# Patient Record
Sex: FEMALE | Race: WHITE | Employment: UNEMPLOYED | URBAN - METROPOLITAN AREA
[De-identification: names, ages, dates, MRNs, and addresses within clinical notes are randomized per-mention and may not be internally consistent; named-entity substitution may affect disease eponyms.]

---

## 2019-09-24 PROBLEM — G89.4 CHRONIC PAIN SYNDROME: Status: ACTIVE | Noted: 2019-09-24

## 2019-10-11 ENCOUNTER — HOSPITAL ENCOUNTER (OUTPATIENT)
Dept: INTERVENTIONAL RADIOLOGY/VASCULAR | Age: 59
Discharge: HOME | End: 2019-10-11
Attending: NEUROLOGICAL SURGERY

## 2019-11-01 ENCOUNTER — HOSPITAL ENCOUNTER (OUTPATIENT)
Dept: CT IMAGING | Age: 59
Discharge: HOME | DRG: 872 | End: 2019-11-01
Attending: NEUROLOGICAL SURGERY
Payer: MEDICARE

## 2019-11-01 ENCOUNTER — HOSPITAL ENCOUNTER (OUTPATIENT)
Dept: INTERVENTIONAL RADIOLOGY/VASCULAR | Age: 59
Discharge: HOME | DRG: 872 | End: 2019-11-01
Attending: NEUROLOGICAL SURGERY
Payer: MEDICARE

## 2019-11-01 VITALS
HEART RATE: 66 BPM | SYSTOLIC BLOOD PRESSURE: 117 MMHG | TEMPERATURE: 97.8 F | RESPIRATION RATE: 18 BRPM | OXYGEN SATURATION: 97 % | DIASTOLIC BLOOD PRESSURE: 71 MMHG

## 2019-11-01 DIAGNOSIS — G89.4 CHRONIC PAIN SYNDROME: ICD-10-CM

## 2019-11-01 DIAGNOSIS — M48.061 SPINAL STENOSIS, LUMBAR REGION, WITHOUT NEUROGENIC CLAUDICATION: ICD-10-CM

## 2019-11-01 PROCEDURE — 72132 CT LUMBAR SPINE W/DYE: CPT

## 2019-11-01 PROCEDURE — 62304 MYELOGRAPHY LUMBAR INJECTION: CPT

## 2019-11-01 PROCEDURE — 74011636320 HC RX REV CODE- 636/320: Performed by: RADIOLOGY

## 2019-11-01 PROCEDURE — 77030014143 HC TY PUNC LUMBR BD -A

## 2019-11-01 PROCEDURE — 77030003666 HC NDL SPINAL BD -A

## 2019-11-01 PROCEDURE — 74011000250 HC RX REV CODE- 250: Performed by: RADIOLOGY

## 2019-11-01 RX ORDER — LIDOCAINE HYDROCHLORIDE 20 MG/ML
1-15 INJECTION, SOLUTION INFILTRATION; PERINEURAL ONCE
Status: COMPLETED | OUTPATIENT
Start: 2019-11-01 | End: 2019-11-01

## 2019-11-01 RX ORDER — DEXAMETHASONE 2 MG/1
2 TABLET ORAL 4 TIMES DAILY
COMMUNITY
End: 2019-11-11

## 2019-11-01 RX ORDER — LIDOCAINE HYDROCHLORIDE 20 MG/ML
1-15 INJECTION, SOLUTION INFILTRATION; PERINEURAL ONCE
Status: DISCONTINUED | OUTPATIENT
Start: 2019-11-01 | End: 2019-11-01

## 2019-11-01 RX ADMIN — LIDOCAINE HYDROCHLORIDE 200 MG: 20 INJECTION, SOLUTION INFILTRATION; PERINEURAL at 11:58

## 2019-11-01 RX ADMIN — IOPAMIDOL 12 ML: 408 INJECTION, SOLUTION INTRATHECAL at 12:03

## 2019-11-01 NOTE — DISCHARGE INSTRUCTIONS
[address]  Department of Interventional Radiology  [phone] Office  [phone] Fax  POST LUMBAR PUNCTURE/MYELOGRAM DISCHARGE INSTRUCTIONS  General Information:  Lumbar Puncture: A LP is done to help diagnose several disorders, like pseudo tumor, migraines, meningitis, and multiple sclerosis. It involves a puncture (usually in the lower spine) into the sac that protects the spinal column. A sample of the fluid in that space is removed and tested in the lab. Myelogram:     A Myelogram involves a lumbar puncture, and instead of removing fluid, contrast will be injected into the sac surrounding the spinal column. It is done to visualize the spinal column, nerve roots, spinal canal, vertebral discs and disc space. It is usually done to diagnose back pain with unknown cause or in preparation for surgery. After the injection, a CT scan will be done, usually within two hours of the injection. Call If:     You should call your Physician and/or the Radiology Nurse if you develop a headache that is not relieved by Tylenol, and worsens when you stand and eases when you lie down, you need to call. You may have developed what is referred to as a spinal headache. Our physician's will probably advise you to be on strict bed rest for 24 hours, to drink lots of fluids and caffeine. If this does not help the head pain, call again the next day. You should call if you have bleeding other than a small spot on your bandage. You should call if you have any numbness, tingling, weakness, fever, chills, urinary retention, severe itching, rash, welts, swelling, or confusion. Follow-Up Instructions: See the doctor who ordered your procedure as he/she has instructed. If you had a Lumbar Puncture or Myelogram, your results should be available to your ordering doctor in 3-5 business days. You can remove your dressing in 24 hours and shower regularly.  Do not bathe or swim for 72 hours. To Reach Us: If you have any questions about your procedure, please call the Interventional Radiology department at [phone]. After business hours (5pm) and weekends, call the answering service at [phone] and ask for the Radiologist on call to be paged. Interventional Radiology General Nurse Discharge    After general anesthesia or intravenous sedation, for 24 hours or while taking prescription Narcotics:  · Limit your activities  · Do not drive and operate hazardous machinery  · Do not make important personal or business decisions  · Do  not drink alcoholic beverages  · If you have not urinated within 8 hours after discharge, please contact your surgeon on call. * Please give a list of your current medications to your Primary Care Provider. * Please update this list whenever your medications are discontinued, doses are     changed, or new medications (including over-the-counter products) are added. * Please carry medication information at all times in case of emergency situations. These are general instructions for a healthy lifestyle:    No smoking/ No tobacco products/ Avoid exposure to second hand smoke  Surgeon General's Warning:  Quitting smoking now greatly reduces serious risk to your health. Obesity, smoking, and sedentary lifestyle greatly increases your risk for illness  A healthy diet, regular physical exercise & weight monitoring are important for maintaining a healthy lifestyle    You may be retaining fluid if you have a history of heart failure or if you experience any of the following symptoms:  Weight gain of 3 pounds or more overnight or 5 pounds in a week, increased swelling in our hands or feet or shortness of breath while lying flat in bed. Please call your doctor as soon as you notice any of these symptoms; do not wait until your next office visit.     Recognize signs and symptoms of STROKE:  F-face looks uneven    A-arms unable to move or move unevenly    S-speech slurred or non-existent    T-time-call 911 as soon as signs and symptoms begin-DO NOT go       Back to bed or wait to see if you get better-TIME IS BRAIN.     Patient Signature:  Date: 11/1/2019  Discharging Nurse: Kevin Whaley RN

## 2019-11-01 NOTE — PROCEDURES
Department of Interventional Radiology  [phone]        Interventional Radiology Brief Procedure Note    Patient: Dannielle Murrieta MRN: [id number]  SSN: [id number]    YOB: 1960  Age: 62 y.o.   Sex: female      Date of Procedure: 11/1/2019    Pre-Procedure Diagnosis: Back pain    Post-Procedure Diagnosis: SAME    Procedure(s): Lumbar Puncture  Myelogram    Brief Description of Procedure: as above    Performed By: Junior Deepali MD     Assistants: None    Anesthesia:Lidocaine    Estimated Blood Loss: None    Specimens:  None    Implants:  None    Findings: L4/5 myelogram    Complications: None    Recommendations: to CT      Follow Up: as needed    Signed By: Junior Deepali MD     November 1, 2019

## 2019-11-02 ENCOUNTER — HOSPITAL ENCOUNTER (INPATIENT)
Age: 59
LOS: 8 days | Discharge: HOME HEALTH | DRG: 872 | End: 2019-11-11
Attending: EMERGENCY MEDICINE | Admitting: INTERNAL MEDICINE
Payer: MEDICARE

## 2019-11-02 ENCOUNTER — APPOINTMENT (OUTPATIENT)
Dept: GENERAL RADIOLOGY | Age: 59
DRG: 872 | End: 2019-11-02
Attending: EMERGENCY MEDICINE
Payer: MEDICARE

## 2019-11-02 DIAGNOSIS — D72.829 LEUKOCYTOSIS, UNSPECIFIED TYPE: ICD-10-CM

## 2019-11-02 DIAGNOSIS — N30.00 ACUTE CYSTITIS WITHOUT HEMATURIA: Primary | ICD-10-CM

## 2019-11-02 PROBLEM — R52 INTRACTABLE PAIN: Status: ACTIVE | Noted: 2019-11-02

## 2019-11-02 LAB
ALBUMIN SERPL-MCNC: 3.4 G/DL (ref 3.2–4.6)
ALBUMIN/GLOB SERPL: 0.6 {RATIO} (ref 1.2–3.5)
ALP SERPL-CCNC: 89 U/L (ref 50–136)
ALT SERPL-CCNC: 13 U/L (ref 12–65)
ANION GAP SERPL CALC-SCNC: 11 MMOL/L (ref 7–16)
AST SERPL-CCNC: 12 U/L (ref 15–37)
BACTERIA URNS QL MICRO: ABNORMAL /HPF
BASOPHILS # BLD: 0 K/UL (ref 0–0.2)
BASOPHILS NFR BLD: 0 % (ref 0–2)
BILIRUB SERPL-MCNC: 0.5 MG/DL (ref 0.2–1.1)
BUN SERPL-MCNC: 19 MG/DL (ref 8–23)
CALCIUM SERPL-MCNC: 9.4 MG/DL (ref 8.3–10.4)
CASTS URNS QL MICRO: 0 /LPF
CHLORIDE SERPL-SCNC: 98 MMOL/L (ref 98–107)
CK SERPL-CCNC: 48 U/L (ref 21–215)
CO2 SERPL-SCNC: 27 MMOL/L (ref 21–32)
CREAT SERPL-MCNC: 0.66 MG/DL (ref 0.6–1)
CRP SERPL-MCNC: 8.7 MG/DL (ref 0–0.9)
DIFFERENTIAL METHOD BLD: ABNORMAL
EOSINOPHIL # BLD: 0 K/UL (ref 0–0.8)
EOSINOPHIL NFR BLD: 0 % (ref 0.5–7.8)
EPI CELLS #/AREA URNS HPF: ABNORMAL /HPF
ERYTHROCYTE [DISTWIDTH] IN BLOOD BY AUTOMATED COUNT: 14.2 % (ref 11.9–14.6)
GLOBULIN SER CALC-MCNC: 5.3 G/DL (ref 2.3–3.5)
GLUCOSE SERPL-MCNC: 237 MG/DL (ref 65–100)
HCT VFR BLD AUTO: 45.1 % (ref 35.8–46.3)
HGB BLD-MCNC: 14.9 G/DL (ref 11.7–15.4)
IMM GRANULOCYTES # BLD AUTO: 0.1 K/UL (ref 0–0.5)
IMM GRANULOCYTES NFR BLD AUTO: 1 % (ref 0–5)
LACTATE BLD-SCNC: 1.26 MMOL/L (ref 0.5–1.9)
LYMPHOCYTES # BLD: 1.8 K/UL (ref 0.5–4.6)
LYMPHOCYTES NFR BLD: 8 % (ref 13–44)
MAGNESIUM SERPL-MCNC: 2.4 MG/DL (ref 1.8–2.4)
MCH RBC QN AUTO: 29.6 PG (ref 26.1–32.9)
MCHC RBC AUTO-ENTMCNC: 33 G/DL (ref 31.4–35)
MCV RBC AUTO: 89.7 FL (ref 79.6–97.8)
MONOCYTES # BLD: 1.8 K/UL (ref 0.1–1.3)
MONOCYTES NFR BLD: 8 % (ref 4–12)
NEUTS SEG # BLD: 18.5 K/UL (ref 1.7–8.2)
NEUTS SEG NFR BLD: 83 % (ref 43–78)
NRBC # BLD: 0 K/UL (ref 0–0.2)
PLATELET # BLD AUTO: 330 K/UL (ref 150–450)
PMV BLD AUTO: 11.8 FL (ref 9.4–12.3)
POTASSIUM SERPL-SCNC: 4 MMOL/L (ref 3.5–5.1)
PROCALCITONIN SERPL-MCNC: <0.1 NG/ML
PROT SERPL-MCNC: 8.7 G/DL (ref 6.3–8.2)
RBC # BLD AUTO: 5.03 M/UL (ref 4.05–5.2)
RBC #/AREA URNS HPF: 0 /HPF
SODIUM SERPL-SCNC: 136 MMOL/L (ref 136–145)
WBC # BLD AUTO: 22.2 K/UL (ref 4.3–11.1)
WBC URNS QL MICRO: ABNORMAL /HPF

## 2019-11-02 PROCEDURE — 83735 ASSAY OF MAGNESIUM: CPT

## 2019-11-02 PROCEDURE — 74011250636 HC RX REV CODE- 250/636: Performed by: EMERGENCY MEDICINE

## 2019-11-02 PROCEDURE — 81003 URINALYSIS AUTO W/O SCOPE: CPT | Performed by: EMERGENCY MEDICINE

## 2019-11-02 PROCEDURE — 81003 URINALYSIS AUTO W/O SCOPE: CPT

## 2019-11-02 PROCEDURE — 81015 MICROSCOPIC EXAM OF URINE: CPT

## 2019-11-02 PROCEDURE — 87150 DNA/RNA AMPLIFIED PROBE: CPT

## 2019-11-02 PROCEDURE — 84145 PROCALCITONIN (PCT): CPT

## 2019-11-02 PROCEDURE — 99218 HC RM OBSERVATION: CPT

## 2019-11-02 PROCEDURE — 86140 C-REACTIVE PROTEIN: CPT

## 2019-11-02 PROCEDURE — 99284 EMERGENCY DEPT VISIT MOD MDM: CPT | Performed by: EMERGENCY MEDICINE

## 2019-11-02 PROCEDURE — 87088 URINE BACTERIA CULTURE: CPT

## 2019-11-02 PROCEDURE — 96375 TX/PRO/DX INJ NEW DRUG ADDON: CPT | Performed by: EMERGENCY MEDICINE

## 2019-11-02 PROCEDURE — 87205 SMEAR GRAM STAIN: CPT

## 2019-11-02 PROCEDURE — 96365 THER/PROPH/DIAG IV INF INIT: CPT | Performed by: EMERGENCY MEDICINE

## 2019-11-02 PROCEDURE — 87040 BLOOD CULTURE FOR BACTERIA: CPT

## 2019-11-02 PROCEDURE — 87086 URINE CULTURE/COLONY COUNT: CPT

## 2019-11-02 PROCEDURE — 96376 TX/PRO/DX INJ SAME DRUG ADON: CPT | Performed by: EMERGENCY MEDICINE

## 2019-11-02 PROCEDURE — 74011250636 HC RX REV CODE- 250/636: Performed by: FAMILY MEDICINE

## 2019-11-02 PROCEDURE — 85025 COMPLETE CBC W/AUTO DIFF WBC: CPT

## 2019-11-02 PROCEDURE — 71046 X-RAY EXAM CHEST 2 VIEWS: CPT

## 2019-11-02 PROCEDURE — 74011000258 HC RX REV CODE- 258: Performed by: EMERGENCY MEDICINE

## 2019-11-02 PROCEDURE — 80053 COMPREHEN METABOLIC PANEL: CPT

## 2019-11-02 PROCEDURE — 83605 ASSAY OF LACTIC ACID: CPT

## 2019-11-02 PROCEDURE — 82550 ASSAY OF CK (CPK): CPT

## 2019-11-02 PROCEDURE — 87077 CULTURE AEROBIC IDENTIFY: CPT

## 2019-11-02 PROCEDURE — 87186 SC STD MICRODIL/AGAR DIL: CPT

## 2019-11-02 PROCEDURE — 96361 HYDRATE IV INFUSION ADD-ON: CPT | Performed by: EMERGENCY MEDICINE

## 2019-11-02 RX ORDER — ONDANSETRON 2 MG/ML
4 INJECTION INTRAMUSCULAR; INTRAVENOUS
Status: COMPLETED | OUTPATIENT
Start: 2019-11-02 | End: 2019-11-02

## 2019-11-02 RX ORDER — HYDROMORPHONE HYDROCHLORIDE 1 MG/ML
1 INJECTION, SOLUTION INTRAMUSCULAR; INTRAVENOUS; SUBCUTANEOUS ONCE
Status: COMPLETED | OUTPATIENT
Start: 2019-11-02 | End: 2019-11-02

## 2019-11-02 RX ORDER — LORAZEPAM 2 MG/ML
1 INJECTION INTRAMUSCULAR ONCE
Status: COMPLETED | OUTPATIENT
Start: 2019-11-02 | End: 2019-11-02

## 2019-11-02 RX ADMIN — SODIUM CHLORIDE 1000 ML: 900 INJECTION, SOLUTION INTRAVENOUS at 20:54

## 2019-11-02 RX ADMIN — HYDROMORPHONE HYDROCHLORIDE 1 MG: 1 INJECTION, SOLUTION INTRAMUSCULAR; INTRAVENOUS; SUBCUTANEOUS at 22:35

## 2019-11-02 RX ADMIN — LORAZEPAM 1 MG: 2 INJECTION, SOLUTION INTRAMUSCULAR; INTRAVENOUS at 23:09

## 2019-11-02 RX ADMIN — ONDANSETRON 4 MG: 2 INJECTION INTRAMUSCULAR; INTRAVENOUS at 20:42

## 2019-11-02 RX ADMIN — CEFTRIAXONE 1 G: 1 INJECTION, POWDER, FOR SOLUTION INTRAMUSCULAR; INTRAVENOUS at 21:35

## 2019-11-02 RX ADMIN — HYDROMORPHONE HYDROCHLORIDE 1 MG: 1 INJECTION, SOLUTION INTRAMUSCULAR; INTRAVENOUS; SUBCUTANEOUS at 21:06

## 2019-11-02 NOTE — ED TRIAGE NOTES
Patient reports fever for 1 month and vomiting intermittently. Had testing CT lumbar with contrast and lumbar puncture myelogram, which resulted as normal. Also placed on steroids, which are no longer helping. Pain radiates down both legs to the knees. Denies urinary symptoms.

## 2019-11-02 NOTE — ED PROVIDER NOTES
Patient  presents the ER complaining of fatigue and worsening back pain. Patient reports progressive worsening of back pain and weakness over the past couple weeks. Has a history of chronic back pain and previous lumbar fusion surgery. Is followed by neurosurgery. Had been on a course of steroids recently for worsening symptoms. Actually had a CT myelogram performed yesterday. Reports some improvement initially with symptoms with Decadron however family notes she has had intermittent fevers over the past couple of days. Reports worsening weakness today. Denies any vomiting but has occasional nausea. Reports some occasional cough. History of COPD. Denies any urinary symptoms. Denies any incontinence of bowel or bladder. Denies any neck pain or neck stiffness    The history is provided by the patient. Back Pain    This is a chronic problem. The current episode started more than 1 week ago. The problem has been gradually worsening. The quality of the pain is described as aching. The pain is at a severity of 5/10. The pain is moderate. Associated symptoms include a fever and weakness. Pertinent negatives include no numbness.         Past Medical History:   Diagnosis Date    Anxiety     Depression     Diabetes ([address]     Thromboembolus (H[facility])     Thyroid cancer ([facility]     Thyroid disease        Past Surgical History:   Procedure Laterality Date    HX BACK SURGERY      HX CHOLECYSTECTOMY      HX GYN      HX LYSIS OF ADHESIONS           Family History:   Problem Relation Age of Onset    Hypertension Mother     Dementia Mother     Cancer Father         lung       Social History     Socioeconomic History    Marital status:      Spouse name: Not on file    Number of children: Not on file    Years of education: Not on file    Highest education level: Not on file   Occupational History    Not on file   Social Needs    Financial resource strain: Not on file    Food insecurity:     Worry: Not on file     Inability: Not on file    Transportation needs:     Medical: Not on file     Non-medical: Not on file   Tobacco Use    Smoking status: Current Every Day Smoker    Smokeless tobacco: Former User   Substance and Sexual Activity    Alcohol use: Yes    Drug use: Not on file    Sexual activity: Not on file   Lifestyle    Physical activity:     Days per week: Not on file     Minutes per session: Not on file    Stress: Not on file   Relationships    Social connections:     Talks on phone: Not on file     Gets together: Not on file     Attends Christian service: Not on file     Active member of club or organization: Not on file     Attends meetings of clubs or organizations: Not on file     Relationship status: Not on file    Intimate partner violence:     Fear of current or ex partner: Not on file     Emotionally abused: Not on file     Physically abused: Not on file     Forced sexual activity: Not on file   Other Topics Concern    Not on file   Social History Narrative    Not on file         ALLERGIES: Latex, natural rubber and Silver nitrate applicators    Review of Systems   Constitutional: Positive for fatigue and fever. HENT: Negative for congestion. Eyes: Negative for photophobia, redness and visual disturbance. Respiratory: Negative for chest tightness, shortness of breath and stridor. Cardiovascular: Negative for palpitations and leg swelling. Gastrointestinal: Positive for nausea. Negative for vomiting. Genitourinary: Negative for flank pain, frequency and urgency. Musculoskeletal: Positive for back pain. Negative for joint swelling, neck pain and neck stiffness. Skin: Negative for color change and pallor. Neurological: Positive for weakness. Negative for light-headedness and numbness. Hematological: Negative for adenopathy. Does not bruise/bleed easily. Psychiatric/Behavioral: Negative for behavioral problems and confusion.    All other systems reviewed and are negative. Vitals:    11/02/19 1842   BP: 125/65   Pulse: 86   Resp: 17   Temp: 98.4 °F (36.9 °C)   SpO2: 95%   Weight: 66.2 kg (146 lb)   Height: 5' 1\" (1.549 m)            Physical Exam   Constitutional: She is oriented to person, place, and time. She appears well-developed and well-nourished. Eyes: Pupils are equal, round, and reactive to light. Conjunctivae and EOM are normal.   Cardiovascular: Normal rate and regular rhythm. Exam reveals no friction rub. No murmur heard. Pulmonary/Chest: Effort normal and breath sounds normal. No stridor. Abdominal: Soft. Bowel sounds are normal. She exhibits no distension. There is no tenderness. Musculoskeletal: Normal range of motion. She exhibits no edema or deformity. Neurological: She is alert and oriented to person, place, and time. No cranial nerve deficit. Coordination normal.   Bilateral Achilles reflexes appear to be symmetrical.   Nursing note and vitals reviewed. MDM  Number of Diagnoses or Management Options  Diagnosis management comments: Given reported fevers, concern for possibly developing infection. Will obtain basic labs, cultures, urinalysis as well as chest x-ray.        Amount and/or Complexity of Data Reviewed  Clinical lab tests: ordered and reviewed  Tests in the radiology section of CPT®: ordered and reviewed    Risk of Complications, Morbidity, and/or Mortality  Presenting problems: moderate  Diagnostic procedures: moderate  Management options: moderate    Patient Progress  Patient progress: stable         Procedures          Results Include:    Recent Results (from the past 24 hour(s))   CBC WITH AUTOMATED DIFF    Collection Time: 11/02/19  6:45 PM   Result Value Ref Range    WBC 22.2 (H) 4.3 - 11.1 K/uL    RBC 5.03 4.05 - 5.2 M/uL    HGB 14.9 11.7 - 15.4 g/dL    HCT 45.1 35.8 - 46.3 %    MCV 89.7 79.6 - 97.8 FL    MCH 29.6 26.1 - 32.9 PG    MCHC 33.0 31.4 - 35.0 g/dL    RDW 14.2 11.9 - 14.6 %    PLATELET 346 725 - 993 K/uL MPV 11.8 9.4 - 12.3 FL    ABSOLUTE NRBC 0.00 0.0 - 0.2 K/uL    DF AUTOMATED      NEUTROPHILS 83 (H) 43 - 78 %    LYMPHOCYTES 8 (L) 13 - 44 %    MONOCYTES 8 4.0 - 12.0 %    EOSINOPHILS 0 (L) 0.5 - 7.8 %    BASOPHILS 0 0.0 - 2.0 %    IMMATURE GRANULOCYTES 1 0.0 - 5.0 %    ABS. NEUTROPHILS 18.5 (H) 1.7 - 8.2 K/UL    ABS. LYMPHOCYTES 1.8 0.5 - 4.6 K/UL    ABS. MONOCYTES 1.8 (H) 0.1 - 1.3 K/UL    ABS. EOSINOPHILS 0.0 0.0 - 0.8 K/UL    ABS. BASOPHILS 0.0 0.0 - 0.2 K/UL    ABS. IMM. GRANS. 0.1 0.0 - 0.5 K/UL   METABOLIC PANEL, COMPREHENSIVE    Collection Time: 11/02/19  8:19 PM   Result Value Ref Range    Sodium 136 136 - 145 mmol/L    Potassium 4.0 3.5 - 5.1 mmol/L    Chloride 98 98 - 107 mmol/L    CO2 27 21 - 32 mmol/L    Anion gap 11 7 - 16 mmol/L    Glucose 237 (H) 65 - 100 mg/dL    BUN 19 8 - 23 MG/DL    Creatinine 0.66 0.6 - 1.0 MG/DL    GFR est AA >60 >60 ml/min/1.73m2    GFR est non-AA >60 >60 ml/min/1.73m2    Calcium 9.4 8.3 - 10.4 MG/DL    Bilirubin, total 0.5 0.2 - 1.1 MG/DL    ALT (SGPT) 13 12 - 65 U/L    AST (SGOT) 12 (L) 15 - 37 U/L    Alk.  phosphatase 89 50 - 136 U/L    Protein, total 8.7 (H) 6.3 - 8.2 g/dL    Albumin 3.4 3.2 - 4.6 g/dL    Globulin 5.3 (H) 2.3 - 3.5 g/dL    A-G Ratio 0.6 (L) 1.2 - 3.5     PROCALCITONIN    Collection Time: 11/02/19  8:19 PM   Result Value Ref Range    Procalcitonin <0.1 ng/mL   CK    Collection Time: 11/02/19  8:19 PM   Result Value Ref Range    CK 48 21 - 215 U/L   C REACTIVE PROTEIN, QT    Collection Time: 11/02/19  8:19 PM   Result Value Ref Range    C-Reactive protein 8.7 (H) 0.0 - 0.9 mg/dL   MAGNESIUM    Collection Time: 11/02/19  8:19 PM   Result Value Ref Range    Magnesium 2.4 1.8 - 2.4 mg/dL   POC LACTIC ACID    Collection Time: 11/02/19  8:24 PM   Result Value Ref Range    Lactic Acid (POC) 1.26 0.5 - 1.9 mmol/L   URINE MICROSCOPIC    Collection Time: 11/02/19  9:03 PM   Result Value Ref Range    WBC 0-3 0 /hpf    RBC 0 0 /hpf    Epithelial cells 0-3 0 /hpf Bacteria 4+ (H) 0 /hpf    Casts 0 0 /lpf     Voice dictation software was used during the making of this note. This software is not perfect and grammatical and other typographical errors may be present. This note has been proofread, but may still contain errors.   Andry Sterling MD; 11/2/2019 @10:03 PM   ===================================================================

## 2019-11-03 ENCOUNTER — APPOINTMENT (OUTPATIENT)
Dept: CT IMAGING | Age: 59
DRG: 872 | End: 2019-11-03
Attending: FAMILY MEDICINE
Payer: MEDICARE

## 2019-11-03 PROBLEM — R11.2 NAUSEA & VOMITING: Status: RESOLVED | Noted: 2019-11-03 | Resolved: 2019-11-03

## 2019-11-03 PROBLEM — A41.01 SEPSIS DUE TO METHICILLIN SUSCEPTIBLE STAPHYLOCOCCUS AUREUS (HCC): Status: ACTIVE | Noted: 2019-11-03

## 2019-11-03 PROBLEM — B95.61 MSSA BACTEREMIA: Status: ACTIVE | Noted: 2019-11-03

## 2019-11-03 PROBLEM — R78.81 MRSA BACTEREMIA: Status: ACTIVE | Noted: 2019-11-03

## 2019-11-03 PROBLEM — A41.9 SEPSIS (HCC): Status: ACTIVE | Noted: 2019-11-03

## 2019-11-03 PROBLEM — G89.4 CHRONIC PAIN SYNDROME: Chronic | Status: ACTIVE | Noted: 2019-09-24

## 2019-11-03 PROBLEM — E11.9 DIABETES (HCC): Chronic | Status: ACTIVE | Noted: 2019-11-03

## 2019-11-03 PROBLEM — R50.9 FEVER: Status: ACTIVE | Noted: 2019-11-03

## 2019-11-03 PROBLEM — B95.62 MRSA BACTEREMIA: Status: ACTIVE | Noted: 2019-11-03

## 2019-11-03 PROBLEM — R82.71 BACTERIURIA: Status: ACTIVE | Noted: 2019-11-03

## 2019-11-03 PROBLEM — M54.59 INTRACTABLE LOW BACK PAIN: Status: ACTIVE | Noted: 2019-11-03

## 2019-11-03 PROBLEM — R11.2 NAUSEA & VOMITING: Status: ACTIVE | Noted: 2019-11-03

## 2019-11-03 LAB
ACC. NO. FROM MICRO ORDER, ACCP: ABNORMAL
ANION GAP SERPL CALC-SCNC: 14 MMOL/L (ref 7–16)
APPEARANCE UR: ABNORMAL
BILIRUB UR QL: NEGATIVE
BUN SERPL-MCNC: 15 MG/DL (ref 8–23)
CALCIUM SERPL-MCNC: 8.6 MG/DL (ref 8.3–10.4)
CHLORIDE SERPL-SCNC: 99 MMOL/L (ref 98–107)
CLINICAL CONSIDERATION: ABNORMAL
CO2 SERPL-SCNC: 21 MMOL/L (ref 21–32)
COLOR UR: YELLOW
CREAT SERPL-MCNC: 0.45 MG/DL (ref 0.6–1)
CRP SERPL-MCNC: 12.8 MG/DL (ref 0–0.9)
ERYTHROCYTE [DISTWIDTH] IN BLOOD BY AUTOMATED COUNT: 14.2 % (ref 11.9–14.6)
GLUCOSE BLD STRIP.AUTO-MCNC: 151 MG/DL (ref 65–100)
GLUCOSE BLD STRIP.AUTO-MCNC: 159 MG/DL (ref 65–100)
GLUCOSE BLD STRIP.AUTO-MCNC: 185 MG/DL (ref 65–100)
GLUCOSE BLD STRIP.AUTO-MCNC: 221 MG/DL (ref 65–100)
GLUCOSE SERPL-MCNC: 167 MG/DL (ref 65–100)
GLUCOSE UR STRIP.AUTO-MCNC: >1000 MG/DL
HCT VFR BLD AUTO: 41.8 % (ref 35.8–46.3)
HGB BLD-MCNC: 13.3 G/DL (ref 11.7–15.4)
HGB UR QL STRIP: NEGATIVE
INTERPRETATION: ABNORMAL
KETONES UR QL STRIP.AUTO: 15 MG/DL
LEUKOCYTE ESTERASE UR QL STRIP.AUTO: NEGATIVE
MCH RBC QN AUTO: 29.2 PG (ref 26.1–32.9)
MCHC RBC AUTO-ENTMCNC: 31.8 G/DL (ref 31.4–35)
MCV RBC AUTO: 91.7 FL (ref 79.6–97.8)
MECA (METHICILLIN-RESISTANCE GENES), MRGP: NOT DETECTED
NITRITE UR QL STRIP.AUTO: NEGATIVE
NRBC # BLD: 0 K/UL (ref 0–0.2)
PH UR STRIP: 5.5 [PH] (ref 5–9)
PLATELET # BLD AUTO: 269 K/UL (ref 150–450)
PMV BLD AUTO: 11.8 FL (ref 9.4–12.3)
POTASSIUM SERPL-SCNC: 3.6 MMOL/L (ref 3.5–5.1)
PROT UR STRIP-MCNC: NEGATIVE MG/DL
RBC # BLD AUTO: 4.56 M/UL (ref 4.05–5.2)
SODIUM SERPL-SCNC: 134 MMOL/L (ref 136–145)
SP GR UR REFRACTOMETRY: 1.04 (ref 1–1.02)
STAPHYLOCOCCUS AUREUS: DETECTED
STAPHYLOCOCCUS, STAPP: DETECTED
UROBILINOGEN UR QL STRIP.AUTO: 1 EU/DL (ref 0.2–1)
WBC # BLD AUTO: 22 K/UL (ref 4.3–11.1)

## 2019-11-03 PROCEDURE — 74011636637 HC RX REV CODE- 636/637: Performed by: FAMILY MEDICINE

## 2019-11-03 PROCEDURE — 74011250636 HC RX REV CODE- 250/636: Performed by: INTERNAL MEDICINE

## 2019-11-03 PROCEDURE — 74011636320 HC RX REV CODE- 636/320: Performed by: FAMILY MEDICINE

## 2019-11-03 PROCEDURE — 74177 CT ABD & PELVIS W/CONTRAST: CPT

## 2019-11-03 PROCEDURE — 86140 C-REACTIVE PROTEIN: CPT

## 2019-11-03 PROCEDURE — 80048 BASIC METABOLIC PNL TOTAL CA: CPT

## 2019-11-03 PROCEDURE — 74011250637 HC RX REV CODE- 250/637: Performed by: INTERNAL MEDICINE

## 2019-11-03 PROCEDURE — 77030020263 HC SOL INJ SOD CL0.9% LFCR 1000ML

## 2019-11-03 PROCEDURE — 74011000258 HC RX REV CODE- 258: Performed by: FAMILY MEDICINE

## 2019-11-03 PROCEDURE — 82962 GLUCOSE BLOOD TEST: CPT

## 2019-11-03 PROCEDURE — 65270000029 HC RM PRIVATE

## 2019-11-03 PROCEDURE — 74011000258 HC RX REV CODE- 258: Performed by: INTERNAL MEDICINE

## 2019-11-03 PROCEDURE — 99218 HC RM OBSERVATION: CPT

## 2019-11-03 PROCEDURE — 74011250636 HC RX REV CODE- 250/636: Performed by: FAMILY MEDICINE

## 2019-11-03 PROCEDURE — 74011250637 HC RX REV CODE- 250/637: Performed by: FAMILY MEDICINE

## 2019-11-03 PROCEDURE — 36415 COLL VENOUS BLD VENIPUNCTURE: CPT

## 2019-11-03 PROCEDURE — 85027 COMPLETE CBC AUTOMATED: CPT

## 2019-11-03 PROCEDURE — 86580 TB INTRADERMAL TEST: CPT | Performed by: FAMILY MEDICINE

## 2019-11-03 PROCEDURE — 74011636637 HC RX REV CODE- 636/637: Performed by: INTERNAL MEDICINE

## 2019-11-03 PROCEDURE — 74011000302 HC RX REV CODE- 302: Performed by: FAMILY MEDICINE

## 2019-11-03 PROCEDURE — 96376 TX/PRO/DX INJ SAME DRUG ADON: CPT

## 2019-11-03 RX ORDER — VANCOMYCIN 1.75 GRAM/500 ML IN 0.9 % SODIUM CHLORIDE INTRAVENOUS
1750 ONCE
Status: DISCONTINUED | OUTPATIENT
Start: 2019-11-03 | End: 2019-11-03

## 2019-11-03 RX ORDER — HEPARIN SODIUM 5000 [USP'U]/ML
5000 INJECTION, SOLUTION INTRAVENOUS; SUBCUTANEOUS EVERY 8 HOURS
Status: DISCONTINUED | OUTPATIENT
Start: 2019-11-03 | End: 2019-11-11 | Stop reason: HOSPADM

## 2019-11-03 RX ORDER — FLUOXETINE HYDROCHLORIDE 20 MG/1
20 CAPSULE ORAL DAILY
Status: DISCONTINUED | OUTPATIENT
Start: 2019-11-03 | End: 2019-11-11 | Stop reason: HOSPADM

## 2019-11-03 RX ORDER — INSULIN LISPRO 100 [IU]/ML
INJECTION, SOLUTION INTRAVENOUS; SUBCUTANEOUS
Status: DISCONTINUED | OUTPATIENT
Start: 2019-11-03 | End: 2019-11-11 | Stop reason: HOSPADM

## 2019-11-03 RX ORDER — VANCOMYCIN/0.9 % SOD CHLORIDE 750 MG/250
750 PLASTIC BAG, INJECTION (ML) INTRAVENOUS EVERY 12 HOURS
Status: DISCONTINUED | OUTPATIENT
Start: 2019-11-04 | End: 2019-11-03

## 2019-11-03 RX ORDER — SODIUM CHLORIDE 0.9 % (FLUSH) 0.9 %
5-40 SYRINGE (ML) INJECTION EVERY 8 HOURS
Status: DISCONTINUED | OUTPATIENT
Start: 2019-11-03 | End: 2019-11-11 | Stop reason: HOSPADM

## 2019-11-03 RX ORDER — OXYCODONE HYDROCHLORIDE 5 MG/1
10 TABLET ORAL
Status: DISCONTINUED | OUTPATIENT
Start: 2019-11-03 | End: 2019-11-11 | Stop reason: HOSPADM

## 2019-11-03 RX ORDER — MORPHINE SULFATE 15 MG/1
15 TABLET, FILM COATED, EXTENDED RELEASE ORAL EVERY 12 HOURS
Status: DISCONTINUED | OUTPATIENT
Start: 2019-11-03 | End: 2019-11-03

## 2019-11-03 RX ORDER — MORPHINE SULFATE 30 MG/1
30 TABLET, FILM COATED, EXTENDED RELEASE ORAL EVERY 12 HOURS
Status: DISCONTINUED | OUTPATIENT
Start: 2019-11-03 | End: 2019-11-11 | Stop reason: HOSPADM

## 2019-11-03 RX ORDER — ONDANSETRON 2 MG/ML
4 INJECTION INTRAMUSCULAR; INTRAVENOUS
Status: DISCONTINUED | OUTPATIENT
Start: 2019-11-03 | End: 2019-11-11 | Stop reason: HOSPADM

## 2019-11-03 RX ORDER — ROSUVASTATIN CALCIUM 20 MG/1
20 TABLET, COATED ORAL
Status: DISCONTINUED | OUTPATIENT
Start: 2019-11-03 | End: 2019-11-11 | Stop reason: HOSPADM

## 2019-11-03 RX ORDER — DIPHENHYDRAMINE HCL 25 MG
25 CAPSULE ORAL
Status: DISCONTINUED | OUTPATIENT
Start: 2019-11-03 | End: 2019-11-11 | Stop reason: HOSPADM

## 2019-11-03 RX ORDER — ALBUTEROL SULFATE 0.83 MG/ML
2.5 SOLUTION RESPIRATORY (INHALATION)
Status: DISCONTINUED | OUTPATIENT
Start: 2019-11-03 | End: 2019-11-11 | Stop reason: HOSPADM

## 2019-11-03 RX ORDER — TRAZODONE HYDROCHLORIDE 50 MG/1
100 TABLET ORAL
Status: DISCONTINUED | OUTPATIENT
Start: 2019-11-03 | End: 2019-11-11 | Stop reason: HOSPADM

## 2019-11-03 RX ORDER — SODIUM CHLORIDE 0.9 % (FLUSH) 0.9 %
5-40 SYRINGE (ML) INJECTION AS NEEDED
Status: DISCONTINUED | OUTPATIENT
Start: 2019-11-03 | End: 2019-11-11 | Stop reason: HOSPADM

## 2019-11-03 RX ORDER — CEFAZOLIN SODIUM/WATER 2 G/20 ML
2 SYRINGE (ML) INTRAVENOUS EVERY 8 HOURS
Status: DISCONTINUED | OUTPATIENT
Start: 2019-11-03 | End: 2019-11-11 | Stop reason: HOSPADM

## 2019-11-03 RX ORDER — HYDROMORPHONE HYDROCHLORIDE 1 MG/ML
1 INJECTION, SOLUTION INTRAMUSCULAR; INTRAVENOUS; SUBCUTANEOUS
Status: DISCONTINUED | OUTPATIENT
Start: 2019-11-03 | End: 2019-11-11 | Stop reason: HOSPADM

## 2019-11-03 RX ORDER — LISINOPRIL 5 MG/1
10 TABLET ORAL DAILY
Status: DISCONTINUED | OUTPATIENT
Start: 2019-11-03 | End: 2019-11-11 | Stop reason: HOSPADM

## 2019-11-03 RX ORDER — BISACODYL 5 MG
5 TABLET, DELAYED RELEASE (ENTERIC COATED) ORAL DAILY PRN
Status: DISCONTINUED | OUTPATIENT
Start: 2019-11-03 | End: 2019-11-11 | Stop reason: HOSPADM

## 2019-11-03 RX ORDER — FLUTICASONE PROPIONATE 50 MCG
2 SPRAY, SUSPENSION (ML) NASAL DAILY
Status: DISCONTINUED | OUTPATIENT
Start: 2019-11-03 | End: 2019-11-11 | Stop reason: HOSPADM

## 2019-11-03 RX ORDER — SODIUM CHLORIDE 0.9 % (FLUSH) 0.9 %
10 SYRINGE (ML) INJECTION
Status: COMPLETED | OUTPATIENT
Start: 2019-11-03 | End: 2019-11-03

## 2019-11-03 RX ORDER — INSULIN GLARGINE 100 [IU]/ML
10 INJECTION, SOLUTION SUBCUTANEOUS
Status: DISCONTINUED | OUTPATIENT
Start: 2019-11-03 | End: 2019-11-04

## 2019-11-03 RX ORDER — CLONAZEPAM 0.5 MG/1
0.5 TABLET ORAL
Status: DISCONTINUED | OUTPATIENT
Start: 2019-11-03 | End: 2019-11-11 | Stop reason: HOSPADM

## 2019-11-03 RX ORDER — GABAPENTIN 400 MG/1
400 CAPSULE ORAL 3 TIMES DAILY
Status: DISCONTINUED | OUTPATIENT
Start: 2019-11-03 | End: 2019-11-11 | Stop reason: HOSPADM

## 2019-11-03 RX ADMIN — INSULIN LISPRO 4 UNITS: 100 INJECTION, SOLUTION INTRAVENOUS; SUBCUTANEOUS at 07:30

## 2019-11-03 RX ADMIN — Medication 10 ML: at 15:04

## 2019-11-03 RX ADMIN — Medication 10 ML: at 01:09

## 2019-11-03 RX ADMIN — GABAPENTIN 400 MG: 400 CAPSULE ORAL at 21:03

## 2019-11-03 RX ADMIN — FLUOXETINE 20 MG: 20 CAPSULE ORAL at 09:29

## 2019-11-03 RX ADMIN — HYDROMORPHONE HYDROCHLORIDE 1 MG: 1 INJECTION, SOLUTION INTRAMUSCULAR; INTRAVENOUS; SUBCUTANEOUS at 21:03

## 2019-11-03 RX ADMIN — DIATRIZOATE MEGLUMINE AND DIATRIZOATE SODIUM 15 ML: 660; 100 LIQUID ORAL; RECTAL at 04:42

## 2019-11-03 RX ADMIN — OXYCODONE HYDROCHLORIDE 10 MG: 5 TABLET ORAL at 00:42

## 2019-11-03 RX ADMIN — LISINOPRIL 10 MG: 5 TABLET ORAL at 09:29

## 2019-11-03 RX ADMIN — GABAPENTIN 400 MG: 400 CAPSULE ORAL at 17:27

## 2019-11-03 RX ADMIN — OXYCODONE HYDROCHLORIDE 10 MG: 5 TABLET ORAL at 23:31

## 2019-11-03 RX ADMIN — ONDANSETRON 4 MG: 2 INJECTION INTRAMUSCULAR; INTRAVENOUS at 01:00

## 2019-11-03 RX ADMIN — HYDROMORPHONE HYDROCHLORIDE 1 MG: 1 INJECTION, SOLUTION INTRAMUSCULAR; INTRAVENOUS; SUBCUTANEOUS at 17:27

## 2019-11-03 RX ADMIN — CLONAZEPAM 0.5 MG: 0.5 TABLET ORAL at 00:41

## 2019-11-03 RX ADMIN — VANCOMYCIN HYDROCHLORIDE 1750 MG: 10 INJECTION, POWDER, LYOPHILIZED, FOR SOLUTION INTRAVENOUS at 15:03

## 2019-11-03 RX ADMIN — TUBERCULIN PURIFIED PROTEIN DERIVATIVE 5 UNITS: 5 INJECTION, SOLUTION INTRADERMAL at 09:27

## 2019-11-03 RX ADMIN — OXYCODONE HYDROCHLORIDE 10 MG: 5 TABLET ORAL at 09:29

## 2019-11-03 RX ADMIN — HEPARIN SODIUM 5000 UNITS: 5000 INJECTION INTRAVENOUS; SUBCUTANEOUS at 21:03

## 2019-11-03 RX ADMIN — MORPHINE SULFATE 15 MG: 15 TABLET, FILM COATED, EXTENDED RELEASE ORAL at 02:30

## 2019-11-03 RX ADMIN — FLUTICASONE PROPIONATE 2 SPRAY: 50 SPRAY, METERED NASAL at 09:27

## 2019-11-03 RX ADMIN — Medication 2 G: at 21:02

## 2019-11-03 RX ADMIN — ROSUVASTATIN CALCIUM 20 MG: 20 TABLET, FILM COATED ORAL at 21:03

## 2019-11-03 RX ADMIN — INSULIN LISPRO 2 UNITS: 100 INJECTION, SOLUTION INTRAVENOUS; SUBCUTANEOUS at 11:30

## 2019-11-03 RX ADMIN — OXYCODONE HYDROCHLORIDE 10 MG: 5 TABLET ORAL at 03:42

## 2019-11-03 RX ADMIN — HYDROMORPHONE HYDROCHLORIDE 1 MG: 1 INJECTION, SOLUTION INTRAMUSCULAR; INTRAVENOUS; SUBCUTANEOUS at 12:05

## 2019-11-03 RX ADMIN — INSULIN GLARGINE 10 UNITS: 100 INJECTION, SOLUTION SUBCUTANEOUS at 22:00

## 2019-11-03 RX ADMIN — HEPARIN SODIUM 5000 UNITS: 5000 INJECTION INTRAVENOUS; SUBCUTANEOUS at 15:00

## 2019-11-03 RX ADMIN — IOPAMIDOL 100 ML: 755 INJECTION, SOLUTION INTRAVENOUS at 07:49

## 2019-11-03 RX ADMIN — HYDROMORPHONE HYDROCHLORIDE 1 MG: 1 INJECTION, SOLUTION INTRAMUSCULAR; INTRAVENOUS; SUBCUTANEOUS at 01:43

## 2019-11-03 RX ADMIN — CEFTRIAXONE 1 G: 1 INJECTION, POWDER, FOR SOLUTION INTRAMUSCULAR; INTRAVENOUS at 10:20

## 2019-11-03 RX ADMIN — TRAZODONE HYDROCHLORIDE 100 MG: 50 TABLET ORAL at 02:30

## 2019-11-03 RX ADMIN — Medication 10 ML: at 05:10

## 2019-11-03 RX ADMIN — MORPHINE SULFATE 30 MG: 30 TABLET, FILM COATED, EXTENDED RELEASE ORAL at 15:03

## 2019-11-03 RX ADMIN — ONDANSETRON 4 MG: 2 INJECTION INTRAMUSCULAR; INTRAVENOUS at 04:53

## 2019-11-03 RX ADMIN — TRAZODONE HYDROCHLORIDE 100 MG: 50 TABLET ORAL at 21:03

## 2019-11-03 RX ADMIN — SODIUM CHLORIDE 100 ML: 900 INJECTION, SOLUTION INTRAVENOUS at 07:49

## 2019-11-03 RX ADMIN — Medication 10 ML: at 07:49

## 2019-11-03 RX ADMIN — INSULIN LISPRO 2 UNITS: 100 INJECTION, SOLUTION INTRAVENOUS; SUBCUTANEOUS at 17:26

## 2019-11-03 RX ADMIN — HYDROMORPHONE HYDROCHLORIDE 1 MG: 1 INJECTION, SOLUTION INTRAMUSCULAR; INTRAVENOUS; SUBCUTANEOUS at 04:50

## 2019-11-03 RX ADMIN — GABAPENTIN 400 MG: 400 CAPSULE ORAL at 09:29

## 2019-11-03 NOTE — PROGRESS NOTES
Patient in severe pain all shift. Patient finally resting this am. Contrast given, patient tollerating well. Awaiting CT.  at bedside. Call light in reach.  Will continue to monitor and report to oncoming shift nurse

## 2019-11-03 NOTE — PROGRESS NOTES
Hospitalist Note     Admit Date:  2019  7:34 PM   Name:  Livan Hurst   Age:  62 y.o.  :  1960   MRN:  [id number]   PCP:  Ramya Hunter MD  Treatment Team: Attending Provider: West Li MD; Primary Nurse: Preston Peterson RN; Primary Nurse: Medina Bartlett; Utilization Review: Eron Christie    HPI/Subjective:   Patient is a 61yo F wit hx anxiety/depression, DM, factor 5, PE, and chronic low back pain who presented with acute worsening of back pain. Has 20y hx back pain and follows pain mgmt and neurosurgery. Has stimulator implanted in back. At a baseline, is functional for ADLs, takes MS contin 15 and prn norco.  c/o 5 days of severe worsening of low back pain radiating into L hip/glute area. No new focal point tenderness. No weakness but limited by pain. one month of worsening fatigue and intermittent fevers and nausea/vomiting. planned EGD/colonoscopy coming up for abdominal complaints. fevers of up to 103 for several days, breaks with sweating, then feels better for a few days. 11/3 - still with severe LBP radiating down both legs, alternating. MS contin increased today. No urinary symptoms or bladder/bowel issues. No CP, SOB, fevers. No other complaints    Objective:     Patient Vitals for the past 24 hrs:   Temp Pulse Resp BP SpO2   19 98.4 °F (36.9 °C) 99 18 133/69 99 %   19 0428 98.4 °F (36.9 °C) 95 20 124/74 97 %   19 0028 98.3 °F (36.8 °C) (!) 106 21 (!) 141/92 92 %   19 98.4 °F (36.9 °C) 86 17 125/65 95 %     Oxygen Therapy  O2 Sat (%): 99 % (19)  O2 Device: Room air (19)    Estimated body mass index is 27.59 kg/m² as calculated from the following:    Height as of this encounter: 5' 1\" (1.549 m). Weight as of this encounter: 66.2 kg (146 lb).       Intake/Output Summary (Last 24 hours) at 11/3/2019 0930  Last data filed at 2019 2248  Gross per 24 hour   Intake 1050 ml   Output    Net 1050 ml *Note that automatically entered I/Os may not be accurate; dependent on patient compliance with collection and accurate  by techs. General:    Well nourished. Alert. Back:  No point tenderness, erythema, fluctuance, swelling  Extremities: Warm and dry. No cyanosis or edema. Skin:     No rashes or jaundice. Neuro:  No gross focal deficits    Data Review:  I have reviewed all labs, meds, and studies from the last 24 hours:    Recent Results (from the past 24 hour(s))   CBC WITH AUTOMATED DIFF    Collection Time: 11/02/19  6:45 PM   Result Value Ref Range    WBC 22.2 (H) 4.3 - 11.1 K/uL    RBC 5.03 4.05 - 5.2 M/uL    HGB 14.9 11.7 - 15.4 g/dL    HCT 45.1 35.8 - 46.3 %    MCV 89.7 79.6 - 97.8 FL    MCH 29.6 26.1 - 32.9 PG    MCHC 33.0 31.4 - 35.0 g/dL    RDW 14.2 11.9 - 14.6 %    PLATELET 454 615 - 646 K/uL    MPV 11.8 9.4 - 12.3 FL    ABSOLUTE NRBC 0.00 0.0 - 0.2 K/uL    DF AUTOMATED      NEUTROPHILS 83 (H) 43 - 78 %    LYMPHOCYTES 8 (L) 13 - 44 %    MONOCYTES 8 4.0 - 12.0 %    EOSINOPHILS 0 (L) 0.5 - 7.8 %    BASOPHILS 0 0.0 - 2.0 %    IMMATURE GRANULOCYTES 1 0.0 - 5.0 %    ABS. NEUTROPHILS 18.5 (H) 1.7 - 8.2 K/UL    ABS. LYMPHOCYTES 1.8 0.5 - 4.6 K/UL    ABS. MONOCYTES 1.8 (H) 0.1 - 1.3 K/UL    ABS. EOSINOPHILS 0.0 0.0 - 0.8 K/UL    ABS. BASOPHILS 0.0 0.0 - 0.2 K/UL    ABS. IMM. GRANS. 0.1 0.0 - 0.5 K/UL   CULTURE, URINE    Collection Time: 11/02/19  8:03 PM   Result Value Ref Range    Special Requests: NO SPECIAL REQUESTS      Culture result:        NO GROWTH AFTER SHORT PERIOD OF INCUBATION. FURTHER RESULTS TO FOLLOW AFTER OVERNIGHT INCUBATION.    METABOLIC PANEL, COMPREHENSIVE    Collection Time: 11/02/19  8:19 PM   Result Value Ref Range    Sodium 136 136 - 145 mmol/L    Potassium 4.0 3.5 - 5.1 mmol/L    Chloride 98 98 - 107 mmol/L    CO2 27 21 - 32 mmol/L    Anion gap 11 7 - 16 mmol/L    Glucose 237 (H) 65 - 100 mg/dL    BUN 19 8 - 23 MG/DL    Creatinine 0.66 0.6 - 1.0 MG/DL    GFR est AA >60 >60 ml/min/1.73m2    GFR est non-AA >60 >60 ml/min/1.73m2    Calcium 9.4 8.3 - 10.4 MG/DL    Bilirubin, total 0.5 0.2 - 1.1 MG/DL    ALT (SGPT) 13 12 - 65 U/L    AST (SGOT) 12 (L) 15 - 37 U/L    Alk.  phosphatase 89 50 - 136 U/L    Protein, total 8.7 (H) 6.3 - 8.2 g/dL    Albumin 3.4 3.2 - 4.6 g/dL    Globulin 5.3 (H) 2.3 - 3.5 g/dL    A-G Ratio 0.6 (L) 1.2 - 3.5     PROCALCITONIN    Collection Time: 11/02/19  8:19 PM   Result Value Ref Range    Procalcitonin <0.1 ng/mL   CK    Collection Time: 11/02/19  8:19 PM   Result Value Ref Range    CK 48 21 - 215 U/L   C REACTIVE PROTEIN, QT    Collection Time: 11/02/19  8:19 PM   Result Value Ref Range    C-Reactive protein 8.7 (H) 0.0 - 0.9 mg/dL   MAGNESIUM    Collection Time: 11/02/19  8:19 PM   Result Value Ref Range    Magnesium 2.4 1.8 - 2.4 mg/dL   POC LACTIC ACID    Collection Time: 11/02/19  8:24 PM   Result Value Ref Range    Lactic Acid (POC) 1.26 0.5 - 1.9 mmol/L   URINE MICROSCOPIC    Collection Time: 11/02/19  9:03 PM   Result Value Ref Range    WBC 0-3 0 /hpf    RBC 0 0 /hpf    Epithelial cells 0-3 0 /hpf    Bacteria 4+ (H) 0 /hpf    Casts 0 0 /lpf   URINALYSIS W/ RFLX MICROSCOPIC    Collection Time: 11/02/19  9:03 PM   Result Value Ref Range    Color YELLOW      Appearance CLOUDY      Specific gravity 1.039 (H) 1.001 - 1.023      pH (UA) 5.5 5.0 - 9.0      Protein NEGATIVE  NEG mg/dL    Glucose >1,000 mg/dL    Ketone 15 (A) NEG mg/dL    Bilirubin NEGATIVE  NEG      Blood NEGATIVE  NEG      Urobilinogen 1.0 0.2 - 1.0 EU/dL    Nitrites NEGATIVE  NEG      Leukocyte Esterase NEGATIVE  NEG     CBC W/O DIFF    Collection Time: 11/03/19  5:29 AM   Result Value Ref Range    WBC 22.0 (H) 4.3 - 11.1 K/uL    RBC 4.56 4.05 - 5.2 M/uL    HGB 13.3 11.7 - 15.4 g/dL    HCT 41.8 35.8 - 46.3 %    MCV 91.7 79.6 - 97.8 FL    MCH 29.2 26.1 - 32.9 PG    MCHC 31.8 31.4 - 35.0 g/dL    RDW 14.2 11.9 - 14.6 %    PLATELET 912 200 - 911 K/uL    MPV 11.8 9.4 - 12.3 FL    ABSOLUTE NRBC 0.00 0.0 - 0.2 K/uL   METABOLIC PANEL, BASIC    Collection Time: 11/03/19  5:29 AM   Result Value Ref Range    Sodium 134 (L) 136 - 145 mmol/L    Potassium 3.6 3.5 - 5.1 mmol/L    Chloride 99 98 - 107 mmol/L    CO2 21 21 - 32 mmol/L    Anion gap 14 7 - 16 mmol/L    Glucose 167 (H) 65 - 100 mg/dL    BUN 15 8 - 23 MG/DL    Creatinine 0.45 (L) 0.6 - 1.0 MG/DL    GFR est AA >60 >60 ml/min/1.73m2    GFR est non-AA >60 >60 ml/min/1.73m2    Calcium 8.6 8.3 - 10.4 MG/DL   C REACTIVE PROTEIN, QT    Collection Time: 11/03/19  5:29 AM   Result Value Ref Range    C-Reactive protein 12.8 (H) 0.0 - 0.9 mg/dL   GLUCOSE, POC    Collection Time: 11/03/19  7:18 AM   Result Value Ref Range    Glucose (POC) 221 (H) 65 - 100 mg/dL        All Micro Results     Procedure Component Value Units Date/Time    CULTURE, URINE [[id number]] Collected:  11/02/19 2003    Order Status:  Completed Specimen:  Cath Urine Updated:  11/03/19 0231     Special Requests: NO SPECIAL REQUESTS        Culture result:       NO GROWTH AFTER SHORT PERIOD OF INCUBATION. FURTHER RESULTS TO FOLLOW AFTER OVERNIGHT INCUBATION. CULTURE, BLOOD [[id number]] Collected:  11/02/19 2223    Order Status:  Completed Specimen:  Blood Updated:  11/02/19 2345    CULTURE, BLOOD [[id number]] Collected:  11/02/19 2223    Order Status:  Completed Specimen:  Blood Updated:  11/02/19 2345          No results found for this visit on 11/02/19.     Current Meds:  Current Facility-Administered Medications   Medication Dose Route Frequency    clonazePAM (KlonoPIN) tablet 0.5 mg  0.5 mg Oral Q6H PRN    FLUoxetine (PROzac) capsule 20 mg  20 mg Oral DAILY    fluticasone propionate (FLONASE) 50 mcg/actuation nasal spray 2 Spray  2 Spray Both Nostrils DAILY    albuterol (PROVENTIL VENTOLIN) nebulizer solution 2.5 mg  2.5 mg Nebulization Q4H PRN    gabapentin (NEURONTIN) capsule 400 mg  400 mg Oral TID    levothyroxine (SYNTHROID) tablet 150 mcg  150 mcg Oral 6am    lisinopril (PRINIVIL, ZESTRIL) tablet 10 mg  10 mg Oral DAILY    HYDROmorphone (PF) (DILAUDID) injection 1 mg  1 mg IntraVENous Q4H PRN    rosuvastatin (CRESTOR) tablet 20 mg  20 mg Oral QHS    traZODone (DESYREL) tablet 100 mg  100 mg Oral QHS    insulin lispro (HUMALOG) injection   SubCUTAneous AC&HS    sodium chloride (NS) flush 5-40 mL  5-40 mL IntraVENous Q8H    sodium chloride (NS) flush 5-40 mL  5-40 mL IntraVENous PRN    tuberculin injection 5 Units  5 Units IntraDERMal ONCE    oxyCODONE IR (ROXICODONE) tablet 10 mg  10 mg Oral Q4H PRN    diphenhydrAMINE (BENADRYL) capsule 25 mg  25 mg Oral Q4H PRN    ondansetron (ZOFRAN) injection 4 mg  4 mg IntraVENous Q4H PRN    bisacodyl (DULCOLAX) tablet 5 mg  5 mg Oral DAILY PRN    heparin (porcine) injection 5,000 Units  5,000 Units SubCUTAneous Q8H    cefTRIAXone (ROCEPHIN) 1 g in 0.9% sodium chloride (MBP/ADV) 50 mL  1 g IntraVENous Q24H    morphine CR (MS CONTIN) tablet 30 mg  30 mg Oral Q12H       Other Studies (last 24 hours):  Xr Chest Pa Lat    Result Date: 11/2/2019  EXAM: Chest x-ray. INDICATION: Fever. COMPARISON: November 25, 2013. TECHNIQUE: Frontal and lateral view chest x-ray. FINDINGS: The lungs are clear. The cardiac size, mediastinal contour and pulmonary vasculature are normal. No pneumothorax or pleural effusion is seen. There are mild degenerative changes in the spine. A new spinal stimulator catheter projects into the mid thoracic spinal canal.     IMPRESSION: No acute process. Ct Abd Pelv W Cont    Result Date: 11/3/2019  Abdomen and Pelvic CT, with intravenous contrast, 11/3/2019 Indication: 78-year-old with a worsening low back pain radiating to the left hip and gluteal area for 5 days. He can intermittent fevers for one month. . Comparison: None. Technique: Spiral images from the diaphragms to the symphysis pubis with enteric and with 100cc Isovue 370.   IV contrast was used to better evaluate the solid organs and any possible inflammation. Coronal reconstructed images also reviewed. Radiation dose reduction techniques were used for this study. Our CT scanners use one or all of the following: Automated exposure control, adjustment of the mA and/or kV according to patient size, iterative reconstruction. Abdomen: No abnormalities of the lung bases. Liver spleen pancreas adrenals and kidneys unremarkable. Postcholecystectomy changes. Normal caliber aorta with calcific atheromatous change. No retroperitoneal adenopathy. No gross upper abdominal bowel abnormalities. Pelvis: Post hysterectomy changes. The appendix is not identified. There are surgical clips about the cecum. Query appendectomy. No evidence for diverticulitis. No significant fluid in the pelvis. Lumbar spine posterior decompression and pedicular screw and posterior blanche fusion. There is a epidural stimulating device extending up to the thoracic area. IMPRESSION: No acute finding abdomen and pelvis. IMPRESSION:       Assessment and Plan:     Hospital Problems as of 11/3/2019 Never Reviewed          Codes Class Noted - Resolved POA    Bacteriuria ICD-10-CM: R82.71  ICD-9-CM: 791.9  11/3/2019 - Present Yes        Fever ICD-10-CM: R50.9  ICD-9-CM: 780.60  11/3/2019 - Present Yes        Nausea & vomiting ICD-10-CM: R11.2  ICD-9-CM: 787.01  11/3/2019 - Present Yes        * (Principal) Intractable pain ICD-10-CM: R52  ICD-9-CM: 780.96  11/2/2019 - Present Yes        Chronic pain syndrome ICD-10-CM: G89.4  ICD-9-CM: 338.4  9/24/2019 - Present Yes              Plan:  Back pain  -increase MS contin to 30mg BID  -already has dilaudid PRN pain  -consult Neurosurgery tomorrow  -per admit note, cannot get MRI spine due to stimulator. Some of these are MRI safe; will defer to neurosurgery. Previous notes indicate maybe it is not MRI-safe. Would like MRI with contrast if possible  -trend CRP, WBC.   Had steroids 3 days ago but demargination of neutrophils should not last that long.  -admit to inpatient status, back pain still uncontrolled, requiring round the clock IV dilaudid and mobility limited by pain    ? UTI  -prob doesn't have UTI. Only 0-3 WBC in urine despite +4 bacteria.   Will continue rocephin for now pending culture, given the failure of WBC to improve and increase in CRP    DM2  -ISS  -add lantus 10u    Factor V Leiden, hx PE  -holding xarelto for now pending neurosurg eval    DC planning/Dispo:    -PPD/PT/OT  -may need STR    Diet:  DIET DIABETIC CONSISTENT CARB  DVT ppx:  heparin    Signed:  Maurice Bailey MD

## 2019-11-03 NOTE — PROGRESS NOTES
Problem: Falls - Risk of  Goal: *Absence of Falls  Description  Document Dede Angel Fall Risk and appropriate interventions in the flowsheet. Outcome: Progressing Towards Goal  Note:   Fall Risk Interventions:  Mobility Interventions: Communicate number of staff needed for ambulation/transfer, Patient to call before getting OOB         Medication Interventions: Patient to call before getting OOB    Elimination Interventions: Call light in reach, Patient to call for help with toileting needs         Educated patient to call for staff assistance to get up out of bed.  Patient voiced understanding

## 2019-11-03 NOTE — PROGRESS NOTES
Pt has MSSA bacteremia. Start ancef. Spinal stimulator will likely have to come out. Also has spinal hardware. After stimulator out we could get MRI. Pt wants spinal stimulator out regardless, says it wasn't very effective. Will await neurosurgery eval.  Check TTE.   Consult ID tomorrow

## 2019-11-03 NOTE — PROGRESS NOTES
11/03/19 0105   Dual Skin Pressure Injury Assessment   Dual Skin Pressure Injury Assessment WDL   Second Care Provider (Based on [address] [place] [address]) Yung ECHOLS   Skin Integumentary   Skin Integumentary (WDL) X   Skin Color Appropriate for ethnicity   Skin Condition/Temp Warm;Dry   Skin Integrity Scars (comment)  (back surgical scars, stimulator implant)   Turgor Epidermis thin w/ loss of subcut tissue   Hair Growth Present   Varicosities Present  (BLE)   No skin breakdown noted. Surgical scars to her lower back and pain stimulator scar and device to lower back. Patient and family oriented to room and questions answered.

## 2019-11-03 NOTE — H&P
HOSPITALIST H&P/CONSULT  NAME:  Gianni Blas   Age:  62 y.o.  :   1960   MRN:   [id number]  PCP: Marizol Cantu MD  Consulting MD:  Treatment Team: Attending Provider: Mitchell Duke MD  HPI:   Patient is a 59yo F wit hx anxiety/depression, DM, factor 5, PE, and chronic pain who presented with acute worsening of back pain. Has 20y hx back pain and follows pain mgmt and neurosurgery. Has stimulator implanted in back. At a baseline, is functional for ADLs, takes MS contin 15 and prn norco.  The patient has had 5 days of severe worsening of low back pain radiating into L hip/glute area. No new focal point tenderness. No weakness but limited by pain. Pt has also had one month of worsening fatigue and intermittent fevers and nausea/vomiting. Has a planned EGD/colonoscopy coming up for abdominal complaints. Fevers have not been addressed. Reports fevers of up to 103 for several days, breaks with sweating, then feels better for a few days. Unsure of last fever. None today. Seen by IR for myelogram, no acute findings. Was given decadron for one day by neurosurgery which was more helpful for pain than her opioids have been. No incontinence, no cp, no sob. Anxious and intermittently standing with assistance of daughters and sitting. Restless in room and crying actively due to pain. Received dilaudid in ER with minimal improvement in pain. WBC elevated (but did have steroids two days ago). CRP also elevated, pct negative.   Urine micro showed 4+ bacteria, received rocephin for possible uti/pyelo contributing to back pain decompensation    Complete ROS done and is as stated in HPI or otherwise negative  Past Medical History:   Diagnosis Date    Anxiety     Depression     Diabetes ([facility]     Thromboembolus ([facility]     Thyroid cancer ([facility]     Thyroid disease       Past Surgical History:   Procedure Laterality Date    HX BACK SURGERY      HX CHOLECYSTECTOMY      HX GYN      HX LYSIS OF ADHESIONS      reviewed  Prior to Admission Medications   Prescriptions Last Dose Informant Patient Reported? Taking? FLUoxetine (PROZAC) 10 mg tablet   Yes No   HYDROcodone-acetaminophen (NORCO)  mg tablet   Yes No   Sig: Take  by mouth. MOVANTIK 25 mg tab tablet   Yes No   albuterol (VENTOLIN HFA) 90 mcg/actuation inhaler   Yes No   albuterol sulfate 90 mcg/actuation aepb   Yes No   Sig: Take 2 Puffs by inhalation. clonazePAM (KLONOPIN) 0.5 mg tablet   Yes No   Sig: Take 0.5 mg by mouth. dexAMETHasone (DECADRON) 2 mg tablet   Yes No   Sig: Take 2 mg by mouth four (4) times daily. Indications: chronic back pain   estradiol (ESTRACE) 0.01 % (0.1 mg/gram) vaginal cream   Yes No   fluticasone propionate (FLONASE) 50 mcg/actuation nasal spray   Yes No   Si Sprays by Nasal route.   gabapentin (NEURONTIN) 800 mg tablet   Yes No   glucose blood VI test strips (ONETOUCH ULTRA BLUE TEST STRIP) strip   Yes No   Sig: OneTouch Ultra Blue In [employer]   Quantity: 100;  Refills: 0     Started -Aug-2012  Active   levothyroxine (SYNTHROID) 150 mcg tablet   Yes No   lisinopril (PRINIVIL, ZESTRIL) 10 mg tablet   Yes No   metFORMIN (GLUCOPHAGE) 1,000 mg tablet   Yes No   morphine CR (MS CONTIN) 15 mg CR tablet   Yes No   pioglitazone (ACTOS) 15 mg tablet   Yes No   raNITIdine (ZANTAC) 300 mg tab   Yes No   rivaroxaban (XARELTO) 20 mg tab tablet   Yes No   Sig: Take 20 mg by mouth.    rosuvastatin (CRESTOR) 20 mg tablet   Yes No   traZODone (DESYREL) 100 mg tablet   Yes No   varenicline (CHANTIX STARTER ESTELITA) 0.5 mg (11)- 1 mg (42) DsPk   Yes No   Sig: Take 0.5mg once daily on days 1-3 and take 0.5mg twice daily on days 4-7, then take 1mg twice daily thereafter      Facility-Administered Medications: None     Allergies   Allergen Reactions    Latex, Natural Rubber Contact Dermatitis    Silver Nitrate Applicators Rash      Social History     Tobacco Use    Smoking status: Current Every Day Smoker    Smokeless tobacco: Former User   Substance Use Topics    Alcohol use: Yes      Family History   Problem Relation Age of Onset    Hypertension Mother     Dementia Mother     Cancer Father         lung    reviewed  Objective:     Visit Vitals  /65 (BP 1 Location: Right arm, BP Patient Position: At rest)   Pulse 86   Temp 98.4 °F (36.9 °C)   Resp 17   Ht 5' 1\" (1.549 m)   Wt 66.2 kg (146 lb)   SpO2 95%   BMI 27.59 kg/m²      Temp (24hrs), Av.4 °F (36.9 °C), Min:98.4 °F (36.9 °C), Max:98.4 °F (36.9 °C)    Oxygen Therapy  O2 Sat (%): 95 % (19)  O2 Device: Room air (19)  Physical Exam:  General:    Alert, well developed, distressed due to uncontrolled pain, anxious. Emesis bag in hand. Head:   Normocephalic, without obvious abnormality, atraumatic. Nose:  Nares normal. No drainage or sinus tenderness. Lungs:   Clear to auscultation bilaterally. No Wheezing or Rhonchi. No rales. Heart:   Regular rate and rhythm,  no murmur, rub or gallop. Abdomen:   Soft, +BS, diffuse tender  Extremities: No cyanosis. No edema. No clubbing  Skin:     Texture, turgor normal. No rashes or lesions.   Not Jaundiced  Neurologic: Alert and oriented x 3, no focal deficits  MSK:   No point tenderness low back, but generalized tenderness lumbar spine to R glute     Data Review:   Recent Results (from the past 24 hour(s))   CBC WITH AUTOMATED DIFF    Collection Time: 19  6:45 PM   Result Value Ref Range    WBC 22.2 (H) 4.3 - 11.1 K/uL    RBC 5.03 4.05 - 5.2 M/uL    HGB 14.9 11.7 - 15.4 g/dL    HCT 45.1 35.8 - 46.3 %    MCV 89.7 79.6 - 97.8 FL    MCH 29.6 26.1 - 32.9 PG    MCHC 33.0 31.4 - 35.0 g/dL    RDW 14.2 11.9 - 14.6 %    PLATELET 470 325 - 684 K/uL    MPV 11.8 9.4 - 12.3 FL    ABSOLUTE NRBC 0.00 0.0 - 0.2 K/uL    DF AUTOMATED      NEUTROPHILS 83 (H) 43 - 78 %    LYMPHOCYTES 8 (L) 13 - 44 %    MONOCYTES 8 4.0 - 12.0 %    EOSINOPHILS 0 (L) 0.5 - 7.8 %    BASOPHILS 0 0.0 - 2.0 %    IMMATURE GRANULOCYTES 1 0.0 - 5.0 %    ABS. NEUTROPHILS 18.5 (H) 1.7 - 8.2 K/UL    ABS. LYMPHOCYTES 1.8 0.5 - 4.6 K/UL    ABS. MONOCYTES 1.8 (H) 0.1 - 1.3 K/UL    ABS. EOSINOPHILS 0.0 0.0 - 0.8 K/UL    ABS. BASOPHILS 0.0 0.0 - 0.2 K/UL    ABS. IMM. GRANS. 0.1 0.0 - 0.5 K/UL   METABOLIC PANEL, COMPREHENSIVE    Collection Time: 11/02/19  8:19 PM   Result Value Ref Range    Sodium 136 136 - 145 mmol/L    Potassium 4.0 3.5 - 5.1 mmol/L    Chloride 98 98 - 107 mmol/L    CO2 27 21 - 32 mmol/L    Anion gap 11 7 - 16 mmol/L    Glucose 237 (H) 65 - 100 mg/dL    BUN 19 8 - 23 MG/DL    Creatinine 0.66 0.6 - 1.0 MG/DL    GFR est AA >60 >60 ml/min/1.73m2    GFR est non-AA >60 >60 ml/min/1.73m2    Calcium 9.4 8.3 - 10.4 MG/DL    Bilirubin, total 0.5 0.2 - 1.1 MG/DL    ALT (SGPT) 13 12 - 65 U/L    AST (SGOT) 12 (L) 15 - 37 U/L    Alk. phosphatase 89 50 - 136 U/L    Protein, total 8.7 (H) 6.3 - 8.2 g/dL    Albumin 3.4 3.2 - 4.6 g/dL    Globulin 5.3 (H) 2.3 - 3.5 g/dL    A-G Ratio 0.6 (L) 1.2 - 3.5     PROCALCITONIN    Collection Time: 11/02/19  8:19 PM   Result Value Ref Range    Procalcitonin <0.1 ng/mL   CK    Collection Time: 11/02/19  8:19 PM   Result Value Ref Range    CK 48 21 - 215 U/L   C REACTIVE PROTEIN, QT    Collection Time: 11/02/19  8:19 PM   Result Value Ref Range    C-Reactive protein 8.7 (H) 0.0 - 0.9 mg/dL   MAGNESIUM    Collection Time: 11/02/19  8:19 PM   Result Value Ref Range    Magnesium 2.4 1.8 - 2.4 mg/dL   POC LACTIC ACID    Collection Time: 11/02/19  8:24 PM   Result Value Ref Range    Lactic Acid (POC) 1.26 0.5 - 1.9 mmol/L   URINE MICROSCOPIC    Collection Time: 11/02/19  9:03 PM   Result Value Ref Range    WBC 0-3 0 /hpf    RBC 0 0 /hpf    Epithelial cells 0-3 0 /hpf    Bacteria 4+ (H) 0 /hpf    Casts 0 0 /lpf     Imaging /Procedures /Studies   XR CHEST PA LAT   Final Result   IMPRESSION: No acute process. Assessment and Plan:      Active Hospital Problems    Diagnosis Date Noted    Intractable pain 11/02/2019 PLAN:  Intractable pain: severe increase from baseline pain. increase home MS contin, change norco to oxy to avoid acetaminophen (do not want to mask fevers). Prn dilaudid if not helping. Consider NSGY eval in AM.  Urine dawson not convincing for pyelo. Culture pending. Received one dose rocephin, hold off on further abx pending infectious signs. CRP elevated but PCT negative. Afebrile here. No antipyretics, monitor for fever. Trend crp. Cannot do MRI lumbar due to stimulator. Evaluate n/v with CT a/p. May be revealing for infectious source.   Holding home xarelto due to myelogram    DVT PPx: HSQ  Code Status: full    Anticipated discharge: less than two midnights    Signed By: Kathy Jansen MD     November 2, 2019

## 2019-11-03 NOTE — PROGRESS NOTES
.. TRANSFER - IN REPORT:    Verbal report received from Alexandro Pena, [facility] on [place] [address]  being received from ED for routine progression of care      Report consisted of patients Situation, Background, Assessment and   Recommendations(SBAR). Information from the following report(s) SBAR, Kardex and ED Summary was reviewed with the receiving nurse. Opportunity for questions and clarification was provided. Assessment completed upon patients arrival to unit and care assumed.

## 2019-11-03 NOTE — PROGRESS NOTES
Pharmacokinetic Consult to Pharmacist    Kimberley Hernandodayanara is a 62 y.o. female being treated for BSI with vancomycin and ceftriaxone. Height: 5' 1\" (154.9 cm)  Weight: 66.2 kg (146 lb)  Lab Results   Component Value Date/Time    BUN 15 11/03/2019 05:29 AM    Creatinine 0.45 (L) 11/03/2019 05:29 AM    WBC 22.0 (H) 11/03/2019 05:29 AM    Procalcitonin <0.1 11/02/2019 08:19 PM    Lactic Acid (POC) 1.26 11/02/2019 08:24 PM      Estimated Creatinine Clearance: 73.5 mL/min (A) (by C-G formula based on SCr of 0.45 mg/dL (L)). Day 1 of vancomycin. Goal trough is 15-20. Dosing started with 1.75g x 1 and then 750 mg q12h. Will continue to follow patient. Thank you,  Ezra Byrd, Pharm. D.   Clinical Pharmacist  [phone]

## 2019-11-03 NOTE — ED NOTES
TRANSFER - OUT REPORT:    Verbal report given to Receiving rn (name) on Gab Zendejas  being transferred to [phone] (unit) for routine progression of care       Report consisted of patients Situation, Background, Assessment and   Recommendations(SBAR). Information from the following report(s) ED Summary was reviewed with the receiving nurse. Lines:   Peripheral IV 11/02/19 Left Antecubital (Active)        Opportunity for questions and clarification was provided.       Patient transported with:   Pt belongings, family

## 2019-11-04 LAB
BASOPHILS # BLD: 0 K/UL (ref 0–0.2)
BASOPHILS NFR BLD: 0 % (ref 0–2)
CRP SERPL HS-MCNC: 256 MG/L
DIFFERENTIAL METHOD BLD: ABNORMAL
EOSINOPHIL # BLD: 0 K/UL (ref 0–0.8)
EOSINOPHIL NFR BLD: 0 % (ref 0.5–7.8)
ERYTHROCYTE [DISTWIDTH] IN BLOOD BY AUTOMATED COUNT: 14.4 % (ref 11.9–14.6)
GLUCOSE BLD STRIP.AUTO-MCNC: 188 MG/DL (ref 65–100)
GLUCOSE BLD STRIP.AUTO-MCNC: 195 MG/DL (ref 65–100)
GLUCOSE BLD STRIP.AUTO-MCNC: 202 MG/DL (ref 65–100)
GLUCOSE BLD STRIP.AUTO-MCNC: 221 MG/DL (ref 65–100)
HCT VFR BLD AUTO: 39.2 % (ref 35.8–46.3)
HGB BLD-MCNC: 12.5 G/DL (ref 11.7–15.4)
IMM GRANULOCYTES # BLD AUTO: 0.1 K/UL (ref 0–0.5)
IMM GRANULOCYTES NFR BLD AUTO: 1 % (ref 0–5)
LYMPHOCYTES # BLD: 1.9 K/UL (ref 0.5–4.6)
LYMPHOCYTES NFR BLD: 11 % (ref 13–44)
MCH RBC QN AUTO: 29.6 PG (ref 26.1–32.9)
MCHC RBC AUTO-ENTMCNC: 31.9 G/DL (ref 31.4–35)
MCV RBC AUTO: 92.7 FL (ref 79.6–97.8)
MM INDURATION POC: 0 MM (ref 0–5)
MONOCYTES # BLD: 1.4 K/UL (ref 0.1–1.3)
MONOCYTES NFR BLD: 8 % (ref 4–12)
NEUTS SEG # BLD: 14.8 K/UL (ref 1.7–8.2)
NEUTS SEG NFR BLD: 81 % (ref 43–78)
NRBC # BLD: 0 K/UL (ref 0–0.2)
PLATELET # BLD AUTO: 232 K/UL (ref 150–450)
PMV BLD AUTO: 11.6 FL (ref 9.4–12.3)
PPD POC: NORMAL
RBC # BLD AUTO: 4.23 M/UL (ref 4.05–5.2)
WBC # BLD AUTO: 18.3 K/UL (ref 4.3–11.1)

## 2019-11-04 PROCEDURE — 65270000029 HC RM PRIVATE

## 2019-11-04 PROCEDURE — 36415 COLL VENOUS BLD VENIPUNCTURE: CPT

## 2019-11-04 PROCEDURE — 74011250637 HC RX REV CODE- 250/637: Performed by: FAMILY MEDICINE

## 2019-11-04 PROCEDURE — 86141 C-REACTIVE PROTEIN HS: CPT

## 2019-11-04 PROCEDURE — 82962 GLUCOSE BLOOD TEST: CPT

## 2019-11-04 PROCEDURE — 74011250636 HC RX REV CODE- 250/636: Performed by: INTERNAL MEDICINE

## 2019-11-04 PROCEDURE — 74011250637 HC RX REV CODE- 250/637: Performed by: INTERNAL MEDICINE

## 2019-11-04 PROCEDURE — C8929 TTE W OR WO FOL WCON,DOPPLER: HCPCS

## 2019-11-04 PROCEDURE — 85025 COMPLETE CBC W/AUTO DIFF WBC: CPT

## 2019-11-04 PROCEDURE — 74011636637 HC RX REV CODE- 636/637: Performed by: INTERNAL MEDICINE

## 2019-11-04 PROCEDURE — 74011636637 HC RX REV CODE- 636/637: Performed by: FAMILY MEDICINE

## 2019-11-04 PROCEDURE — 74011250636 HC RX REV CODE- 250/636: Performed by: FAMILY MEDICINE

## 2019-11-04 RX ORDER — INSULIN LISPRO 100 [IU]/ML
5 INJECTION, SOLUTION INTRAVENOUS; SUBCUTANEOUS
Status: DISCONTINUED | OUTPATIENT
Start: 2019-11-04 | End: 2019-11-08

## 2019-11-04 RX ORDER — POLYETHYLENE GLYCOL 3350 17 G/17G
17 POWDER, FOR SOLUTION ORAL DAILY
Status: DISCONTINUED | OUTPATIENT
Start: 2019-11-04 | End: 2019-11-04

## 2019-11-04 RX ORDER — POLYETHYLENE GLYCOL 3350 17 G/17G
17 POWDER, FOR SOLUTION ORAL
Status: DISCONTINUED | OUTPATIENT
Start: 2019-11-04 | End: 2019-11-11 | Stop reason: HOSPADM

## 2019-11-04 RX ORDER — KETOROLAC TROMETHAMINE 30 MG/ML
30 INJECTION, SOLUTION INTRAMUSCULAR; INTRAVENOUS
Status: COMPLETED | OUTPATIENT
Start: 2019-11-04 | End: 2019-11-04

## 2019-11-04 RX ORDER — AMOXICILLIN 250 MG
2 CAPSULE ORAL DAILY
Status: DISCONTINUED | OUTPATIENT
Start: 2019-11-04 | End: 2019-11-11 | Stop reason: HOSPADM

## 2019-11-04 RX ORDER — INSULIN GLARGINE 100 [IU]/ML
20 INJECTION, SOLUTION SUBCUTANEOUS
Status: DISCONTINUED | OUTPATIENT
Start: 2019-11-04 | End: 2019-11-06

## 2019-11-04 RX ORDER — CELECOXIB 200 MG/1
200 CAPSULE ORAL DAILY
COMMUNITY

## 2019-11-04 RX ADMIN — LEVOTHYROXINE SODIUM 150 MCG: 100 TABLET ORAL at 04:20

## 2019-11-04 RX ADMIN — INSULIN GLARGINE 20 UNITS: 100 INJECTION, SOLUTION SUBCUTANEOUS at 21:16

## 2019-11-04 RX ADMIN — Medication 10 ML: at 13:31

## 2019-11-04 RX ADMIN — ONDANSETRON 4 MG: 2 INJECTION INTRAMUSCULAR; INTRAVENOUS at 01:53

## 2019-11-04 RX ADMIN — HYDROMORPHONE HYDROCHLORIDE 1 MG: 1 INJECTION, SOLUTION INTRAMUSCULAR; INTRAVENOUS; SUBCUTANEOUS at 15:06

## 2019-11-04 RX ADMIN — Medication 2 G: at 19:48

## 2019-11-04 RX ADMIN — FLUOXETINE 20 MG: 20 CAPSULE ORAL at 09:00

## 2019-11-04 RX ADMIN — HEPARIN SODIUM 5000 UNITS: 5000 INJECTION INTRAVENOUS; SUBCUTANEOUS at 04:19

## 2019-11-04 RX ADMIN — PERFLUTREN 1 ML: 6.52 INJECTION, SUSPENSION INTRAVENOUS at 10:50

## 2019-11-04 RX ADMIN — GABAPENTIN 400 MG: 400 CAPSULE ORAL at 15:06

## 2019-11-04 RX ADMIN — KETOROLAC TROMETHAMINE 30 MG: 30 INJECTION, SOLUTION INTRAMUSCULAR at 02:03

## 2019-11-04 RX ADMIN — OXYCODONE HYDROCHLORIDE 10 MG: 5 TABLET ORAL at 23:32

## 2019-11-04 RX ADMIN — HYDROMORPHONE HYDROCHLORIDE 1 MG: 1 INJECTION, SOLUTION INTRAMUSCULAR; INTRAVENOUS; SUBCUTANEOUS at 09:00

## 2019-11-04 RX ADMIN — INSULIN LISPRO 2 UNITS: 100 INJECTION, SOLUTION INTRAVENOUS; SUBCUTANEOUS at 21:17

## 2019-11-04 RX ADMIN — FLUTICASONE PROPIONATE 2 SPRAY: 50 SPRAY, METERED NASAL at 09:04

## 2019-11-04 RX ADMIN — ROSUVASTATIN CALCIUM 20 MG: 20 TABLET, FILM COATED ORAL at 21:16

## 2019-11-04 RX ADMIN — INSULIN LISPRO 4 UNITS: 100 INJECTION, SOLUTION INTRAVENOUS; SUBCUTANEOUS at 17:19

## 2019-11-04 RX ADMIN — TRAZODONE HYDROCHLORIDE 100 MG: 50 TABLET ORAL at 21:15

## 2019-11-04 RX ADMIN — INSULIN LISPRO 2 UNITS: 100 INJECTION, SOLUTION INTRAVENOUS; SUBCUTANEOUS at 11:25

## 2019-11-04 RX ADMIN — OXYCODONE HYDROCHLORIDE 10 MG: 5 TABLET ORAL at 17:22

## 2019-11-04 RX ADMIN — OXYCODONE HYDROCHLORIDE 10 MG: 5 TABLET ORAL at 11:23

## 2019-11-04 RX ADMIN — HEPARIN SODIUM 5000 UNITS: 5000 INJECTION INTRAVENOUS; SUBCUTANEOUS at 13:31

## 2019-11-04 RX ADMIN — GABAPENTIN 400 MG: 400 CAPSULE ORAL at 09:00

## 2019-11-04 RX ADMIN — Medication 2 G: at 03:14

## 2019-11-04 RX ADMIN — GABAPENTIN 400 MG: 400 CAPSULE ORAL at 21:16

## 2019-11-04 RX ADMIN — HEPARIN SODIUM 5000 UNITS: 5000 INJECTION INTRAVENOUS; SUBCUTANEOUS at 21:15

## 2019-11-04 RX ADMIN — MORPHINE SULFATE 30 MG: 30 TABLET, FILM COATED, EXTENDED RELEASE ORAL at 14:25

## 2019-11-04 RX ADMIN — INSULIN LISPRO 4 UNITS: 100 INJECTION, SOLUTION INTRAVENOUS; SUBCUTANEOUS at 09:00

## 2019-11-04 RX ADMIN — HYDROMORPHONE HYDROCHLORIDE 1 MG: 1 INJECTION, SOLUTION INTRAMUSCULAR; INTRAVENOUS; SUBCUTANEOUS at 19:49

## 2019-11-04 RX ADMIN — MORPHINE SULFATE 30 MG: 30 TABLET, FILM COATED, EXTENDED RELEASE ORAL at 03:14

## 2019-11-04 RX ADMIN — INSULIN LISPRO 5 UNITS: 100 INJECTION, SOLUTION INTRAVENOUS; SUBCUTANEOUS at 17:18

## 2019-11-04 RX ADMIN — Medication 10 ML: at 19:51

## 2019-11-04 RX ADMIN — Medication 2 G: at 11:25

## 2019-11-04 RX ADMIN — HYDROMORPHONE HYDROCHLORIDE 1 MG: 1 INJECTION, SOLUTION INTRAMUSCULAR; INTRAVENOUS; SUBCUTANEOUS at 01:47

## 2019-11-04 RX ADMIN — INSULIN LISPRO 5 UNITS: 100 INJECTION, SOLUTION INTRAVENOUS; SUBCUTANEOUS at 11:25

## 2019-11-04 RX ADMIN — Medication 10 ML: at 21:17

## 2019-11-04 NOTE — PROGRESS NOTES
Patient pain is not being managed with current regimen. Nurse paged MD and asked for toradol as what the patient is describing sounds more like severe inflammation. MD will placed order.

## 2019-11-04 NOTE — PROGRESS NOTES
Initial visit by  to convey care and concern and encourage patient that  services are available if desired. Offered prayer during the visit as requested. Provided 's business card for future reference. Chaplains remain available for support.      Tucker Rios 66  Board Certified

## 2019-11-04 NOTE — PROGRESS NOTES
Hospitalist Note     Admit Date:  2019  7:34 PM   Name:  Gold Granados   Age:  62 y.o.  :  1960   MRN:  [id number]   PCP:  Chris Vigil MD  Treatment Team: Attending Provider: Gil Headley MD; Utilization Review: Tristen Pérez; Consulting Provider: Enedina Tillman MD; Consulting Provider: Howie Olszewski, MD    HPI/Subjective:   Patient is a 59yo F wit hx anxiety/depression, DM, factor 5, PE, and chronic low back pain who presented with acute worsening of back pain. Has 20y hx back pain and follows pain mgmt and neurosurgery. Has stimulator implanted in back. At a baseline, is functional for ADLs, takes MS contin 15 and prn norco.  c/o 5 days of severe worsening of low back pain radiating into L hip/glute area. No new focal point tenderness. No weakness but limited by pain. one month of worsening fatigue and intermittent fevers and nausea/vomiting. planned EGD/colonoscopy coming up for abdominal complaints. fevers of up to 103 for several days, breaks with sweating, then feels better for a few days. 11/ - pt feeling a little better; LBP better controlled on MS contin increase. Also got toradol last night. No CP, SOB, fevers, or other complaints. Objective:     Patient Vitals for the past 24 hrs:   Temp Pulse Resp BP SpO2   19 0858  87  105/57    19 0823    (!) 85/41    19 0821 98.1 °F (36.7 °C) 90 18 (!) 92/34 96 %   19 0358 97.7 °F (36.5 °C) 79 18 108/62 93 %   19 2320 98.1 °F (36.7 °C) 90 17 114/65 97 %   19 2039 98.6 °F (37 °C) 96 18 134/60 92 %   19 1459 98.7 °F (37.1 °C) 95 18 107/59 100 %   19 1139 98.6 °F (37 °C) 91 18 127/66 97 %     Oxygen Therapy  O2 Sat (%): 96 % (19 0821)  O2 Device: Room air (19 2864)    Estimated body mass index is 27.59 kg/m² as calculated from the following:    Height as of this encounter: 5' 1\" (1.549 m). Weight as of this encounter: 66.2 kg (146 lb).     No intake or output data in the 24 hours ending 11/04/19 0916    *Note that automatically entered I/Os may not be accurate; dependent on patient compliance with collection and accurate  by techs. General:    Well nourished. Alert. Back:  No point tenderness, erythema, fluctuance, swelling  Extremities: Warm and dry. No cyanosis or edema. Skin:     No rashes or jaundice. Neuro:  No gross focal deficits    Data Review:  I have reviewed all labs, meds, and studies from the last 24 hours:    Recent Results (from the past 24 hour(s))   GLUCOSE, POC    Collection Time: 11/03/19 11:37 AM   Result Value Ref Range    Glucose (POC) 151 (H) 65 - 100 mg/dL   GLUCOSE, POC    Collection Time: 11/03/19  2:58 PM   Result Value Ref Range    Glucose (POC) 185 (H) 65 - 100 mg/dL   GLUCOSE, POC    Collection Time: 11/03/19  8:56 PM   Result Value Ref Range    Glucose (POC) 159 (H) 65 - 100 mg/dL   CBC WITH AUTOMATED DIFF    Collection Time: 11/04/19  6:09 AM   Result Value Ref Range    WBC 18.3 (H) 4.3 - 11.1 K/uL    RBC 4.23 4.05 - 5.2 M/uL    HGB 12.5 11.7 - 15.4 g/dL    HCT 39.2 35.8 - 46.3 %    MCV 92.7 79.6 - 97.8 FL    MCH 29.6 26.1 - 32.9 PG    MCHC 31.9 31.4 - 35.0 g/dL    RDW 14.4 11.9 - 14.6 %    PLATELET 455 761 - 682 K/uL    MPV 11.6 9.4 - 12.3 FL    ABSOLUTE NRBC 0.00 0.0 - 0.2 K/uL    DF AUTOMATED      NEUTROPHILS 81 (H) 43 - 78 %    LYMPHOCYTES 11 (L) 13 - 44 %    MONOCYTES 8 4.0 - 12.0 %    EOSINOPHILS 0 (L) 0.5 - 7.8 %    BASOPHILS 0 0.0 - 2.0 %    IMMATURE GRANULOCYTES 1 0.0 - 5.0 %    ABS. NEUTROPHILS 14.8 (H) 1.7 - 8.2 K/UL    ABS. LYMPHOCYTES 1.9 0.5 - 4.6 K/UL    ABS. MONOCYTES 1.4 (H) 0.1 - 1.3 K/UL    ABS. EOSINOPHILS 0.0 0.0 - 0.8 K/UL    ABS. BASOPHILS 0.0 0.0 - 0.2 K/UL    ABS. IMM.  GRANS. 0.1 0.0 - 0.5 K/UL   CRP, HIGH SENSITIVITY    Collection Time: 11/04/19  6:09 AM   Result Value Ref Range    CRP, High sensitivity 256.0 mg/L   GLUCOSE, POC    Collection Time: 11/04/19  7:46 AM   Result Value Ref Range    Glucose (POC) 202 (H) 65 - 100 mg/dL   PLEASE READ & DOCUMENT PPD TEST IN 24 HRS    Collection Time: 11/04/19  9:05 AM   Result Value Ref Range    PPD      mm Induration 0 0 - 5 mm        All Micro Results     Procedure Component Value Units Date/Time    CULTURE, URINE [[id number]]  (Abnormal) Collected:  11/02/19 2003    Order Status:  Completed Specimen:  Cath Urine Updated:  11/04/19 0806     Special Requests: NO SPECIAL REQUESTS        Culture result:       >100,000 COLONIES/mL GRAM NEGATIVE RODS SUBCULTURE IN PROGRESS                  <1,000 CFU/ML MIXED SKIN JOEL ISOLATED          CULTURE, BLOOD [[id number]]  (Abnormal) Collected:  11/02/19 2123    Order Status:  Completed Specimen:  Blood Updated:  11/04/19 0713     Special Requests: --        RIGHT  HAND       GRAM STAIN GRAM POSITIVE COCCI         AEROBIC BOTTLE POSITIVE               CRITICAL RESULT NOT CALLED DUE TO PREVIOUS NOTIFICATION OF CRITICAL RESULT WITHIN THE LAST 24 HOURS.            Culture result: STAPHYLOCOCCUS AUREUS               CULTURE IN PROGRESS,FURTHER UPDATES TO FOLLOW          CULTURE, BLOOD [[id number]]  (Abnormal) Collected:  11/02/19 2123    Order Status:  Completed Specimen:  Blood Updated:  11/04/19 0712     Special Requests: --        RIGHT  Antecubital       GRAM STAIN GRAM POSITIVE COCCI               AEROBIC AND ANAEROBIC BOTTLES                  RESULTS VERIFIED, PHONED TO AND READ BACK BY Paulo Cabrera RN AT 0538 11.3.19            Culture result:       STAPHYLOCOCCUS AUREUS SENSITIVITY TO FOLLOW            REFER TO [address] PANEL [address] [id number]    BLOOD CULTURE ID PANEL [[id number]]  (Abnormal) Collected:  11/02/19 2123    Order Status:  Completed Specimen:  Blood Updated:  11/03/19 1318     Acc. no. from Micro Order [id number]     Staphylococcus DETECTED        Staphylococcus aureus DETECTED        mecA (Methicillin-Resistance Genes) NOT DETECTED        INTERPRETATION       Gram positive cocci in clusters, identified in realtime PCR as probable MSSA. Comment: RESULTS VERIFIED, PHONED TO AND READ BACK BY  Ines Jacobs RN AT 3376 11.3.19           Clinical Consideration       Recommend discontinuing IV vancomycin starting cefazolin or nafcillin if patient not on beta-lactam therapy. Infectious Diseases Consult recommended in adult patients. THIS TEST DOES NOT REPLACE SENSITIVITY TESTING. No results found for this visit on 11/02/19.     Current Meds:  Current Facility-Administered Medications   Medication Dose Route Frequency    clonazePAM (KlonoPIN) tablet 0.5 mg  0.5 mg Oral Q6H PRN    FLUoxetine (PROzac) capsule 20 mg  20 mg Oral DAILY    fluticasone propionate (FLONASE) 50 mcg/actuation nasal spray 2 Spray  2 Spray Both Nostrils DAILY    albuterol (PROVENTIL VENTOLIN) nebulizer solution 2.5 mg  2.5 mg Nebulization Q4H PRN    gabapentin (NEURONTIN) capsule 400 mg  400 mg Oral TID    levothyroxine (SYNTHROID) tablet 150 mcg  150 mcg Oral 6am    [Held by provider] lisinopril (PRINIVIL, ZESTRIL) tablet 10 mg  10 mg Oral DAILY    HYDROmorphone (PF) (DILAUDID) injection 1 mg  1 mg IntraVENous Q4H PRN    rosuvastatin (CRESTOR) tablet 20 mg  20 mg Oral QHS    traZODone (DESYREL) tablet 100 mg  100 mg Oral QHS    insulin lispro (HUMALOG) injection   SubCUTAneous AC&HS    sodium chloride (NS) flush 5-40 mL  5-40 mL IntraVENous Q8H    sodium chloride (NS) flush 5-40 mL  5-40 mL IntraVENous PRN    tuberculin injection 5 Units  5 Units IntraDERMal ONCE    oxyCODONE IR (ROXICODONE) tablet 10 mg  10 mg Oral Q4H PRN    diphenhydrAMINE (BENADRYL) capsule 25 mg  25 mg Oral Q4H PRN    ondansetron (ZOFRAN) injection 4 mg  4 mg IntraVENous Q4H PRN    bisacodyl (DULCOLAX) tablet 5 mg  5 mg Oral DAILY PRN    heparin (porcine) injection 5,000 Units  5,000 Units SubCUTAneous Q8H    morphine CR (MS CONTIN) tablet 30 mg  30 mg Oral Q12H    insulin glargine (LANTUS) injection 10 Units  10 Units SubCUTAneous QHS  ceFAZolin (ANCEF) 2 g/20 mL in sterile water IV syringe  2 g IntraVENous Q8H       Other Studies (last 24 hours):  No results found. Assessment and Plan:     Hospital Problems as of 11/4/2019 Never Reviewed          Codes Class Noted - Resolved POA    MSSA bacteremia ICD-10-CM: R78.81  ICD-9-CM: 790.7, 041.11  11/3/2019 - Present Yes        Intractable low back pain ICD-10-CM: M54.5  ICD-9-CM: 724.2  11/3/2019 - Present Yes        * (Principal) Sepsis due to methicillin susceptible Staphylococcus aureus ([facility] ICD-10-CM: A41.01  ICD-9-CM: 038.11, 995.91  11/3/2019 - Present Yes        Diabetes ([facility] (Chronic) ICD-10-CM: E11.9  ICD-9-CM: 250.00  11/3/2019 - Present Yes        Chronic pain syndrome (Chronic) ICD-10-CM: G89.4  ICD-9-CM: 338.4  9/24/2019 - Present Yes        RESOLVED: Nausea & vomiting ICD-10-CM: R11.2  ICD-9-CM: 787.01  11/3/2019 - 11/3/2019 Yes              Plan:  MSSA bacteremia, worsened back pain  -appreciate neurosurgery and ID help  -MS contin 30mg BID  -bowel regimen  -dilaudid and oxy PRN   -cont ancef started 11/3.   Had rocephin x2d and vanc x1 yesterday  -repeat blood cx tomorrow morning, or today; defer to ID  -echo pending  -pt says [employer] told her that the spinal stimulator is not MRI safe    HTN  -hold norvasc today    DM2  -ISS  -increase lantus from 10 to 20  -5u prandial    Factor V Leiden, hx PE  -holding xarelto for now pending neurosurg eval    DC planning/Dispo:    -PPD/PT/OT  -may need STR    Diet:  DIET DIABETIC CONSISTENT CARB  DVT ppx:  heparin    Signed:  Radha Burnett MD

## 2019-11-04 NOTE — PROGRESS NOTES
Admission database completed, pts home meds at bedside, removed Morphine Sulfate ER, Klonopin, and Norco from room, counts verified with Madeleine ECHOLS, and meds locked in narcotic box at nurses station, asked pt to send the rest of her meds home with her  today, verbalized understanding.

## 2019-11-04 NOTE — CONSULTS
Infectious Disease Consult    Today's Date: 11/4/2019   Admit Date: 11/2/2019    Impression:   · MSSA bacteremia  · Exacerbation of chronic back pain in the setting of lumbar hardware and spinal cord stimulator  · TTE pending    Plan:   ·  continue cefazolin, I agree with this choice  · Repeat blood cultures tomorrow morning  · I anticipate a prolonged antibiotic course of 6-8 week and then make a decision about extending past that. It's going to be a difficult judgement call because she has chronic back pain already and can't have an MRI with her spinal stimulator. Anti-infectives:   · Ceftriaxone (11/2/19 - 11/3/19)  · Vancomycin (11/3/19)  · Cefazolin (11/3 - )    Subjective:   Date of Consultation:  November 4, 2019  Referring Physician: Arely Mccarty    Patient is a 62 y.o. female with DM2 and chronic back pain with a history of lumbar fusion as well as spinal stimulator placement who presented to the ED yesterday with worsening back pain x 5 days radiating to the left hip. She actually reports that while pain is worse over the past week, it is generally worse over the past few months. He has also been having a month of worsening fatigue and intermittent fever up to 103.  + nausea/vomiting. Blood cultures from yesterday quickly grew MSSA and has has also recently been evaluated with CT spine and CT myelogram.  She was initially started on vancomycin and ceftriaxone and now is continued on cefazolin.       Patient Active Problem List   Diagnosis Code    Chronic pain syndrome G89.4    MSSA bacteremia R78.81    Intractable low back pain M54.5    Sepsis due to methicillin susceptible Staphylococcus aureus (HCC) A41.01    Diabetes ([address] E11.9     Past Medical History:   Diagnosis Date    Anxiety     Depression     Diabetes ([address]     Thromboembolus ([address]     Thyroid cancer ([address]     Thyroid disease       Family History   Problem Relation Age of Onset    Hypertension Mother     Dementia Mother     Cancer Father         lung      Social History     Tobacco Use    Smoking status: Current Every Day Smoker    Smokeless tobacco: Former User   Substance Use Topics    Alcohol use: Yes     Past Surgical History:   Procedure Laterality Date    HX BACK SURGERY      HX CHOLECYSTECTOMY      HX GYN      HX LYSIS OF ADHESIONS        Prior to Admission medications    Medication Sig Start Date End Date Taking? Authorizing Provider   morphine CR (MS CONTIN) 15 mg CR tablet  9/2/19  Yes Provider, Historical   dexAMETHasone (DECADRON) 2 mg tablet Take 2 mg by mouth four (4) times daily. Indications: chronic back pain    Provider, Historical   albuterol sulfate 90 mcg/actuation aepb Take 2 Puffs by inhalation. 4/17/19   Provider, Historical   albuterol (VENTOLIN HFA) 90 mcg/actuation inhaler  4/17/19   Provider, Historical   glucose blood VI test strips (ONETOUCH ULTRA BLUE TEST STRIP) strip OneTouch Ultra Blue In Vitro Strip   Quantity: 100;  Refills: 0     Started 19-Aug-2012  Active 8/19/12   Provider, Historical   clonazePAM (KLONOPIN) 0.5 mg tablet Take 0.5 mg by mouth. 5/16/19   Provider, Historical   estradiol (ESTRACE) 0.01 % (0.1 mg/gram) vaginal cream  8/30/19   Provider, Historical   FLUoxetine (PROZAC) 10 mg tablet 20 mg daily. 8/30/19   Provider, Historical   fluticasone propionate (FLONASE) 50 mcg/actuation nasal spray 2 Sprays by Nasal route. 5/16/19   Provider, Historical   gabapentin (NEURONTIN) 800 mg tablet  8/21/19   Provider, Historical   HYDROcodone-acetaminophen (NORCO)  mg tablet Take  by mouth.     Provider, Historical   levothyroxine (SYNTHROID) 150 mcg tablet  9/3/19   Provider, Historical   lisinopril (PRINIVIL, ZESTRIL) 10 mg tablet  8/24/19   Provider, Historical   metFORMIN (GLUCOPHAGE) 1,000 mg tablet  8/24/19   Provider, Historical   MOVANTIK 25 mg tab tablet  8/30/19   Provider, Historical   pioglitazone (ACTOS) 15 mg tablet  9/3/19   Provider, Historical   raNITIdine (ZANTAC) 300 mg tab 9/3/19   Provider, Historical   rivaroxaban (XARELTO) 20 mg tab tablet Take 20 mg by mouth. 6/10/19   Provider, Historical   rosuvastatin (CRESTOR) 20 mg tablet  19   Provider, Historical   traZODone (DESYREL) 100 mg tablet  19   Provider, Historical   varenicline (CHANTIX STARTER ESTELITA) 0.5 mg (11)- 1 mg (42) DsPk Take 0.5mg once daily on days 1-3 and take 0.5mg twice daily on days 4-7, then take 1mg twice daily thereafter 19   Provider, Historical       Allergies   Allergen Reactions    Latex, Natural Rubber Contact Dermatitis    Silver Nitrate Applicators Rash        Review of Systems:  A comprehensive review of systems was negative except for that written in the History of Present Illness. Objective:     Visit Vitals  /57   Pulse 87   Temp 98.1 °F (36.7 °C)   Resp 18   Ht 5' 1\" (1.549 m)   Wt 66.2 kg (146 lb)   SpO2 96%   BMI 27.59 kg/m²     Temp (24hrs), Av.3 °F (36.8 °C), Min:97.7 °F (36.5 °C), Max:98.7 °F (37.1 °C)       Lines:  Peripheral IV:       Physical Exam:    General:  Alert, cooperative, well nourished, well developed, appears stated age; in discomfort, but walking around in the room   Eyes:  Sclera anicteric. Pupils equally round and reactive to light. Mouth/Throat: Mucous membranes normal, oral pharynx clear   Neck: Supple   Lungs:   Clear to auscultation bilaterally, good effort   CV:  Regular rate and rhythm,no murmur, click, rub or gallop   Abdomen:   Soft, non-tender.  bowel sounds normal. non-distended   Extremities: No cyanosis or edema   Skin: Skin color, texture, turgor normal. no acute rash or lesions   Lymph nodes: Cervical and supraclavicular normal   Musculoskeletal: No swelling or deformity   Lines/Devices:  Intact, no erythema, drainage or tenderness   Psych:  Back:  Alert and oriented, normal mood affect given the setting  Spinal stimulator generator site and lumbar surgical site look benign       Data Review:     CBC:  Recent Labs     19  0609 11/03/19 0529 11/02/19  1845   WBC 18.3* 22.0* 22.2*   GRANS 81*  --  83*   MONOS 8  --  8   EOS 0*  --  0*   ANEU 14.8*  --  18.5*   ABL 1.9  --  1.8   HGB 12.5 13.3 14.9   HCT 39.2 41.8 45.1    269 330       BMP:  Recent Labs     11/03/19 0529 11/02/19 2019   CREA 0.45* 0.66   BUN 15 19   * 136   K 3.6 4.0   CL 99 98   CO2 21 27   AGAP 14 11   * 237*       LFTS:  Recent Labs     11/02/19 2019   TBILI 0.5   ALT 13   SGOT 12*   AP 89   TP 8.7*   ALB 3.4       Microbiology:     All Micro Results     Procedure Component Value Units Date/Time    CULTURE, URINE [[id number]]  (Abnormal) Collected:  11/02/19 2003    Order Status:  Completed Specimen:  Cath Urine Updated:  11/04/19 0806     Special Requests: NO SPECIAL REQUESTS        Culture result:       >100,000 COLONIES/mL GRAM NEGATIVE RODS SUBCULTURE IN PROGRESS                  <1,000 CFU/ML MIXED SKIN JOEL ISOLATED          CULTURE, BLOOD [[id number]]  (Abnormal) Collected:  11/02/19 2123    Order Status:  Completed Specimen:  Blood Updated:  11/04/19 0713     Special Requests: --        RIGHT  HAND       GRAM STAIN GRAM POSITIVE COCCI         AEROBIC BOTTLE POSITIVE               CRITICAL RESULT NOT CALLED DUE TO PREVIOUS NOTIFICATION OF CRITICAL RESULT WITHIN THE LAST 24 HOURS.            Culture result: STAPHYLOCOCCUS AUREUS               CULTURE IN PROGRESS,FURTHER UPDATES TO FOLLOW          CULTURE, BLOOD [[id number]]  (Abnormal) Collected:  11/02/19 2123    Order Status:  Completed Specimen:  Blood Updated:  11/04/19 0712     Special Requests: --        RIGHT  Antecubital       GRAM STAIN GRAM POSITIVE COCCI               AEROBIC AND ANAEROBIC BOTTLES                  RESULTS VERIFIED, PHONED TO AND READ BACK BY Jose Eduardo Cruz RN AT 2978 11.3.19            Culture result:       STAPHYLOCOCCUS AUREUS SENSITIVITY TO FOLLOW            REFER TO [address] PANEL [address] [id number]    BLOOD CULTURE ID PANEL [[id number]]  (Abnormal) Collected:  11/02/19 2123    Order Status:  Completed Specimen:  Blood Updated:  11/03/19 1318     Acc. no. from Micro Order [id number]     Staphylococcus DETECTED        Staphylococcus aureus DETECTED        mecA (Methicillin-Resistance Genes) NOT DETECTED        INTERPRETATION       Gram positive cocci in clusters, identified in realtime PCR as probable MSSA. Comment: RESULTS VERIFIED, PHONED TO AND READ BACK BY  Raymon Shen RN AT 5646 11.3.19           Clinical Consideration       Recommend discontinuing IV vancomycin starting cefazolin or nafcillin if patient not on beta-lactam therapy. Infectious Diseases Consult recommended in adult patients. THIS TEST DOES NOT REPLACE SENSITIVITY TESTING. Imaging:   CT abd/pelvis (11/3/19)  IMPRESSION: No acute finding abdomen and pelvis    CXR (11/2/19)  IMPRESSION: No acute process    CT spine (11/1/19)  IMPRESSION:  1. Postoperative changes noted L3-L5. 2. Moderate bilateral neural foraminal narrowing present at L4-L5 and L5-S1  3. Mild bilateral neural foraminal narrowing noted at L3-L4.     CT myelogram (11/1/19)   Contrast readily flows throughout the lumbar region      Signed By: Raciel Gresham MD     November 4, 2019

## 2019-11-04 NOTE — PROGRESS NOTES
NS  PATIENT KNOWN  TO  ME .  I SAW HER IN THE OFFICE ONE TIME  AND ORDERED  A LUMBAR CT/MYELOGRAM.  REVIEWED   WIDE OPEN CANAL   NO STENOSIS  NON SURGICAL  ADMITTED WITH SEPSIS   FEELS BETTER  ON IV  ANTIBIOTICS. HAS A SCS IN PLACE   WOULD LIKE IT REMOVED  A/P CAN SCHEDULE  ELECTIVELY ONCE HER INFECTION HAS CLEARED    SHE CAN CALL THE OFFICE  TO SCHEDULE  ONCE SHE IS MEDICALLY CLEARED.     NO CHARGE TO PATIENT FOR THIS VISIT    Jose Robledo MD

## 2019-11-04 NOTE — PROGRESS NOTES
Hourly rounds completed throughout this shift. Pt requested for pain medicine throughout the shift. Pt medicated for pain per MAR. Pt resting in bed; denies needs at this time. Will continue to monitor and report to oncoming night shift nurse.

## 2019-11-04 NOTE — PROGRESS NOTES
MARISSW met with pt to introduce supportive care planning. Initial referral sent to [facility] to look at pt for anticipated home IV abx care. Will follow up with pt about anticipated costs as orders are received for home infusion. Pt denied any additional questions at this time. Care Management Interventions  PCP Verified by CM: Lillie Plan)  Mode of Transport at Discharge: (family)  Transition of Care Consult (CM Consult): Discharge Planning(Pt is insured but medicare and still has her [employer] COBRA coverage which she plans to keep for no until she can switch to a medicare supplement.   Referral to [facility] as home IV abx are anticipated for pt.)  Discharge Durable Medical Equipment: (Pt ambultes with a cane due to her back pain.)  Physical Therapy Consult: No  Occupational Therapy Consult: No  Speech Therapy Consult: No  Current Support Network: Lives with Spouse  Confirm Follow Up Transport: Family  Plan discussed with Pt/Family/Caregiver: Yes  Freedom of Choice Offered: Yes   Resource Information Provided?: No  Discharge Location  Discharge Placement: Home with home health(Anticipate pt will need home health and home infusion for IV abx at discharge.)

## 2019-11-04 NOTE — PROGRESS NOTES
Interdisciplinary Rounds completed 11/04/19. Nursing, Case Management, Physician and PT present. Plan of care reviewed and updated. Will need outpt antibiotics.

## 2019-11-04 NOTE — PROGRESS NOTES
Hourly rounds performed, all needs met. Pain medication given per MAR. Will continue to monitor and give report to oncoming nurse.

## 2019-11-05 LAB
BACTERIA SPEC CULT: ABNORMAL
BASOPHILS # BLD: 0 K/UL (ref 0–0.2)
BASOPHILS NFR BLD: 0 % (ref 0–2)
DIFFERENTIAL METHOD BLD: ABNORMAL
EOSINOPHIL # BLD: 0.1 K/UL (ref 0–0.8)
EOSINOPHIL NFR BLD: 1 % (ref 0.5–7.8)
ERYTHROCYTE [DISTWIDTH] IN BLOOD BY AUTOMATED COUNT: 14 % (ref 11.9–14.6)
GLUCOSE BLD STRIP.AUTO-MCNC: 160 MG/DL (ref 65–100)
GLUCOSE BLD STRIP.AUTO-MCNC: 165 MG/DL (ref 65–100)
GLUCOSE BLD STRIP.AUTO-MCNC: 181 MG/DL (ref 65–100)
GLUCOSE BLD STRIP.AUTO-MCNC: 338 MG/DL (ref 65–100)
GRAM STN SPEC: ABNORMAL
HCT VFR BLD AUTO: 42.7 % (ref 35.8–46.3)
HGB BLD-MCNC: 13.8 G/DL (ref 11.7–15.4)
IMM GRANULOCYTES # BLD AUTO: 0.1 K/UL (ref 0–0.5)
IMM GRANULOCYTES NFR BLD AUTO: 1 % (ref 0–5)
LYMPHOCYTES # BLD: 2.4 K/UL (ref 0.5–4.6)
LYMPHOCYTES NFR BLD: 15 % (ref 13–44)
MCH RBC QN AUTO: 29.1 PG (ref 26.1–32.9)
MCHC RBC AUTO-ENTMCNC: 32.3 G/DL (ref 31.4–35)
MCV RBC AUTO: 90.1 FL (ref 79.6–97.8)
MM INDURATION POC: 0 MM (ref 0–5)
MONOCYTES # BLD: 1.4 K/UL (ref 0.1–1.3)
MONOCYTES NFR BLD: 9 % (ref 4–12)
NEUTS SEG # BLD: 12.7 K/UL (ref 1.7–8.2)
NEUTS SEG NFR BLD: 76 % (ref 43–78)
NRBC # BLD: 0 K/UL (ref 0–0.2)
PLATELET # BLD AUTO: 254 K/UL (ref 150–450)
PMV BLD AUTO: 12.1 FL (ref 9.4–12.3)
PPD POC: NEGATIVE NEGATIVE
RBC # BLD AUTO: 4.74 M/UL (ref 4.05–5.2)
SERVICE CMNT-IMP: ABNORMAL
SERVICE CMNT-IMP: ABNORMAL
WBC # BLD AUTO: 16.8 K/UL (ref 4.3–11.1)

## 2019-11-05 PROCEDURE — 74011250637 HC RX REV CODE- 250/637: Performed by: FAMILY MEDICINE

## 2019-11-05 PROCEDURE — 74011250637 HC RX REV CODE- 250/637: Performed by: INTERNAL MEDICINE

## 2019-11-05 PROCEDURE — 87186 SC STD MICRODIL/AGAR DIL: CPT

## 2019-11-05 PROCEDURE — 74011250636 HC RX REV CODE- 250/636: Performed by: FAMILY MEDICINE

## 2019-11-05 PROCEDURE — 87205 SMEAR GRAM STAIN: CPT

## 2019-11-05 PROCEDURE — 87077 CULTURE AEROBIC IDENTIFY: CPT

## 2019-11-05 PROCEDURE — 87040 BLOOD CULTURE FOR BACTERIA: CPT

## 2019-11-05 PROCEDURE — 74011636637 HC RX REV CODE- 636/637: Performed by: INTERNAL MEDICINE

## 2019-11-05 PROCEDURE — 74011250636 HC RX REV CODE- 250/636: Performed by: INTERNAL MEDICINE

## 2019-11-05 PROCEDURE — 74011636637 HC RX REV CODE- 636/637: Performed by: FAMILY MEDICINE

## 2019-11-05 PROCEDURE — 87150 DNA/RNA AMPLIFIED PROBE: CPT

## 2019-11-05 PROCEDURE — 82962 GLUCOSE BLOOD TEST: CPT

## 2019-11-05 PROCEDURE — 85025 COMPLETE CBC W/AUTO DIFF WBC: CPT

## 2019-11-05 PROCEDURE — 65270000029 HC RM PRIVATE

## 2019-11-05 PROCEDURE — 36415 COLL VENOUS BLD VENIPUNCTURE: CPT

## 2019-11-05 RX ORDER — SAME BUTANEDISULFONATE/BETAINE 400-600 MG
500 POWDER IN PACKET (EA) ORAL DAILY
Status: DISCONTINUED | OUTPATIENT
Start: 2019-11-05 | End: 2019-11-11 | Stop reason: HOSPADM

## 2019-11-05 RX ADMIN — TRAZODONE HYDROCHLORIDE 100 MG: 50 TABLET ORAL at 21:42

## 2019-11-05 RX ADMIN — HEPARIN SODIUM 5000 UNITS: 5000 INJECTION INTRAVENOUS; SUBCUTANEOUS at 13:26

## 2019-11-05 RX ADMIN — GABAPENTIN 400 MG: 400 CAPSULE ORAL at 08:31

## 2019-11-05 RX ADMIN — HYDROMORPHONE HYDROCHLORIDE 1 MG: 1 INJECTION, SOLUTION INTRAMUSCULAR; INTRAVENOUS; SUBCUTANEOUS at 20:06

## 2019-11-05 RX ADMIN — INSULIN LISPRO 5 UNITS: 100 INJECTION, SOLUTION INTRAVENOUS; SUBCUTANEOUS at 08:31

## 2019-11-05 RX ADMIN — INSULIN LISPRO 2 UNITS: 100 INJECTION, SOLUTION INTRAVENOUS; SUBCUTANEOUS at 11:42

## 2019-11-05 RX ADMIN — INSULIN LISPRO 2 UNITS: 100 INJECTION, SOLUTION INTRAVENOUS; SUBCUTANEOUS at 08:32

## 2019-11-05 RX ADMIN — Medication 10 ML: at 04:28

## 2019-11-05 RX ADMIN — INSULIN LISPRO 8 UNITS: 100 INJECTION, SOLUTION INTRAVENOUS; SUBCUTANEOUS at 17:18

## 2019-11-05 RX ADMIN — Medication 10 ML: at 21:44

## 2019-11-05 RX ADMIN — ONDANSETRON 4 MG: 2 INJECTION INTRAMUSCULAR; INTRAVENOUS at 03:11

## 2019-11-05 RX ADMIN — OXYCODONE HYDROCHLORIDE 10 MG: 5 TABLET ORAL at 14:50

## 2019-11-05 RX ADMIN — Medication 2 G: at 11:41

## 2019-11-05 RX ADMIN — INSULIN LISPRO 5 UNITS: 100 INJECTION, SOLUTION INTRAVENOUS; SUBCUTANEOUS at 11:41

## 2019-11-05 RX ADMIN — Medication 2 G: at 20:10

## 2019-11-05 RX ADMIN — CLONAZEPAM 0.5 MG: 0.5 TABLET ORAL at 13:53

## 2019-11-05 RX ADMIN — MORPHINE SULFATE 30 MG: 30 TABLET, FILM COATED, EXTENDED RELEASE ORAL at 03:06

## 2019-11-05 RX ADMIN — OXYCODONE HYDROCHLORIDE 10 MG: 5 TABLET ORAL at 08:31

## 2019-11-05 RX ADMIN — ROSUVASTATIN CALCIUM 20 MG: 20 TABLET, FILM COATED ORAL at 21:42

## 2019-11-05 RX ADMIN — HYDROMORPHONE HYDROCHLORIDE 1 MG: 1 INJECTION, SOLUTION INTRAMUSCULAR; INTRAVENOUS; SUBCUTANEOUS at 06:25

## 2019-11-05 RX ADMIN — Medication 10 ML: at 13:27

## 2019-11-05 RX ADMIN — GABAPENTIN 400 MG: 400 CAPSULE ORAL at 21:42

## 2019-11-05 RX ADMIN — INSULIN LISPRO 5 UNITS: 100 INJECTION, SOLUTION INTRAVENOUS; SUBCUTANEOUS at 17:18

## 2019-11-05 RX ADMIN — HYDROMORPHONE HYDROCHLORIDE 1 MG: 1 INJECTION, SOLUTION INTRAMUSCULAR; INTRAVENOUS; SUBCUTANEOUS at 11:34

## 2019-11-05 RX ADMIN — HYDROMORPHONE HYDROCHLORIDE 1 MG: 1 INJECTION, SOLUTION INTRAMUSCULAR; INTRAVENOUS; SUBCUTANEOUS at 17:18

## 2019-11-05 RX ADMIN — Medication 2 G: at 03:11

## 2019-11-05 RX ADMIN — GABAPENTIN 400 MG: 400 CAPSULE ORAL at 17:19

## 2019-11-05 RX ADMIN — INSULIN GLARGINE 20 UNITS: 100 INJECTION, SOLUTION SUBCUTANEOUS at 21:43

## 2019-11-05 RX ADMIN — FLUTICASONE PROPIONATE 2 SPRAY: 50 SPRAY, METERED NASAL at 08:34

## 2019-11-05 RX ADMIN — HEPARIN SODIUM 5000 UNITS: 5000 INJECTION INTRAVENOUS; SUBCUTANEOUS at 04:26

## 2019-11-05 RX ADMIN — HEPARIN SODIUM 5000 UNITS: 5000 INJECTION INTRAVENOUS; SUBCUTANEOUS at 21:42

## 2019-11-05 RX ADMIN — INSULIN LISPRO 2 UNITS: 100 INJECTION, SOLUTION INTRAVENOUS; SUBCUTANEOUS at 21:43

## 2019-11-05 RX ADMIN — CLONAZEPAM 0.5 MG: 0.5 TABLET ORAL at 03:11

## 2019-11-05 RX ADMIN — LEVOTHYROXINE SODIUM 150 MCG: 100 TABLET ORAL at 04:27

## 2019-11-05 RX ADMIN — CLONAZEPAM 0.5 MG: 0.5 TABLET ORAL at 20:06

## 2019-11-05 RX ADMIN — FLUOXETINE 20 MG: 20 CAPSULE ORAL at 08:31

## 2019-11-05 RX ADMIN — Medication 500 MG: at 11:34

## 2019-11-05 RX ADMIN — MORPHINE SULFATE 30 MG: 30 TABLET, FILM COATED, EXTENDED RELEASE ORAL at 13:26

## 2019-11-05 RX ADMIN — OXYCODONE HYDROCHLORIDE 10 MG: 5 TABLET ORAL at 22:28

## 2019-11-05 NOTE — PROGRESS NOTES
Infectious Disease Note    Today's Date: 2019   Admit Date: 2019    Impression:   · MSSA bacteremia (). TTE NG but technically difficult  · Exacerbation of chronic back pain in the setting of lumbar hardware and spinal cord stimulator. [facility]  · [address] swelling and pain    Plan:   · Continue cefazolin 2g IV q8h. For now, will not pursue ALLI as duration will not change. May reassess if BCs return as positive. · Follow repeat blood cultures  · Duration: 6-8 week and then make a decision about extending past that. 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 Ideally, would like removal during abx course    Anti-infectives:   · Ceftriaxone (19 - 11/3/19)  · Vancomycin (11/3/19)  · Cefazolin (11/3 - )    Subjective:   Limping around room due to pain, tearful. SCC and previous lumbar sites benign. Denies nausea, vomiting, diarrhea, fever, chills, or sweats    Allergies   Allergen Reactions    Latex, Natural Rubber Contact Dermatitis    Silver Nitrate Applicators Rash        Review of Systems:  A comprehensive review of systems was negative except for that written in the History of Present Illness. Objective:     Visit Vitals  /62   Pulse 84   Temp 98 °F (36.7 °C)   Resp 18   Ht 5' 1\" (1.549 m)   Wt 66.2 kg (146 lb)   SpO2 94%   BMI 27.59 kg/m²     Temp (24hrs), Av.4 °F (36.9 °C), Min:98 °F (36.7 °C), Max:99.4 °F (37.4 °C)       Lines:  Peripheral IV:       Physical Exam:    General:  Alert, cooperative, well nourished, well developed, appears stated age; in discomfort, but walking around in the room   Eyes:  Sclera anicteric. Pupils equally round and reactive to light. Mouth/Throat: Mucous membranes normal, oral pharynx clear   Neck: Supple   Lungs:   Clear to auscultation bilaterally, good effort   CV:  Regular rate and rhythm,no murmur, click, rub or gallop   Abdomen:   Soft, non-tender.  bowel sounds normal. non-distended   Extremities: No cyanosis or edema Skin: Skin color, texture, turgor normal. no acute rash or lesions   Lymph nodes: Cervical and supraclavicular normal   Musculoskeletal: No swelling or deformity. Mild swelling with erythema to R 5 MC--has ROM   Lines/Devices:  Intact, no erythema, drainage or tenderness   Psych:  Back:  Alert and oriented, normal mood affect given the setting  Spinal stimulator generator site and lumbar surgical site look benign           Data Review:     CBC:  Recent Labs     11/04/19  0609 11/03/19 0529 11/02/19  1845   WBC 18.3* 22.0* 22.2*   GRANS 81*  --  83*   MONOS 8  --  8   EOS 0*  --  0*   ANEU 14.8*  --  18.5*   ABL 1.9  --  1.8   HGB 12.5 13.3 14.9   HCT 39.2 41.8 45.1    269 330       BMP:  Recent Labs     11/03/19 0529 11/02/19 2019   CREA 0.45* 0.66   BUN 15 19   * 136   K 3.6 4.0   CL 99 98   CO2 21 27   AGAP 14 11   * 237*       LFTS:  Recent Labs     11/02/19 2019   TBILI 0.5   ALT 13   SGOT 12*   AP 89   TP 8.7*   ALB 3.4       Microbiology:     All Micro Results     Procedure Component Value Units Date/Time    CULTURE, BLOOD [[id number]] Collected:  11/05/19 0713    Order Status:  Completed Specimen:  Blood Updated:  11/05/19 0735    CULTURE, BLOOD [[id number]] Collected:  11/05/19 0705    Order Status:  Completed Specimen:  Blood Updated:  11/05/19 0735    CULTURE, BLOOD [[id number]]  (Abnormal) Collected:  11/02/19 2123    Order Status:  Completed Specimen:  Blood Updated:  11/05/19 0731     Special Requests: --        RIGHT  HAND       GRAM STAIN GRAM POSITIVE COCCI         AEROBIC BOTTLE POSITIVE               CRITICAL RESULT NOT CALLED DUE TO PREVIOUS NOTIFICATION OF CRITICAL RESULT WITHIN THE LAST 24 HOURS.            Culture result: STAPHYLOCOCCUS AUREUS               For Susceptibility Refer to Culture  [facility] [id number]      CULTURE, BLOOD [[id number]]  (Abnormal)  (Susceptibility) Collected:  11/02/19 2123    Order Status:  Completed Specimen:  Blood Updated:  11/05/19 2348 Special Requests: --        RIGHT  Antecubital       GRAM STAIN GRAM POSITIVE COCCI               AEROBIC AND ANAEROBIC BOTTLES                  RESULTS VERIFIED, PHONED TO AND READ BACK BY Jessy Ordonez RN AT 8632 11.3.19            Culture result: STAPHYLOCOCCUS AUREUS         REFER TO [address] PANEL [address] [id number]    CULTURE, URINE [[id number]]  (Abnormal) Collected:  11/02/19 2003    Order Status:  Completed Specimen:  Cath Urine Updated:  11/05/19 0719     Special Requests: NO SPECIAL REQUESTS        Culture result:       >100,000 COLONIES/mL GRAM NEGATIVE RODS IDENTIFICATION AND SUSCEPTIBILITY TO FOLLOW                  <1,000 CFU/ML MIXED SKIN JOEL ISOLATED          BLOOD CULTURE ID PANEL [[id number]]  (Abnormal) Collected:  11/02/19 2123    Order Status:  Completed Specimen:  Blood Updated:  11/03/19 1318     Acc. no. from Micro Order [id number]     Staphylococcus DETECTED        Staphylococcus aureus DETECTED        mecA (Methicillin-Resistance Genes) NOT DETECTED        INTERPRETATION       Gram positive cocci in clusters, identified in realtime PCR as probable MSSA. Comment: RESULTS VERIFIED, PHONED TO AND READ BACK BY  Jessy Ordonez RN AT 0754 11.3.19           Clinical Consideration       Recommend discontinuing IV vancomycin starting cefazolin or nafcillin if patient not on beta-lactam therapy. Infectious Diseases Consult recommended in adult patients. THIS TEST DOES NOT REPLACE SENSITIVITY TESTING. Imaging:   CT abd/pelvis (11/3/19)  IMPRESSION: No acute finding abdomen and pelvis    CXR (11/2/19)  IMPRESSION: No acute process    CT spine (11/1/19)  IMPRESSION:  1. Postoperative changes noted L3-L5. 2. Moderate bilateral neural foraminal narrowing present at L4-L5 and L5-S1  3. Mild bilateral neural foraminal narrowing noted at L3-L4.     CT myelogram (11/1/19)   Contrast readily flows throughout the lumbar region      Signed By: Willie Galdamez NP     November 5, 2019

## 2019-11-05 NOTE — PROGRESS NOTES
Hourly rounds performed, family at bedside. Pain medication rotated and given per [facility] around the clock. Will continue to monitor pt and give report to oncoming nurse.

## 2019-11-05 NOTE — PROGRESS NOTES
Interdisciplinary Rounds completed 11/05/19. Nursing, Case Management, Physician and PT present. Plan of care reviewed and updated. ID following. Will need long term antibiotics.

## 2019-11-05 NOTE — PROGRESS NOTES
Hospitalist Progress Note    2019  Admit Date: 2019  7:34 PM   NAME: Mihai Juarez   :  1960   DOS:              19  MRN:  [id number]   Attending: Bonifacio Kelly MD  PCP:  Rob Rojas MD  Treatment Team: Attending Provider: John Castle MD; Utilization Review: Josiane Morales; Consulting Provider: Abe Tinsley MD; Care Manager: Karly Haddad LMSW    Full Code     SUBJECTIVE:   As previously documented: 59yo F wit hx anxiety/depression, DM, factor 5, PE, and chronic low back pain who presented with acute worsening of back pain. Has 20y hx back pain and follows pain mgmt and neurosurgery. Has stimulator implanted in back.  At a baseline, is functional for ADLs, takes MS contin 15 and prn norco.  c/o 5 days of severe worsening of low back pain radiating into L hip/glute area. fevers of up to 103 for several days, breaks with sweating, then feels better for a few days. Patient unable to have MRI due to spinal simulator. 19    Mihai Juarez reported pain is somewhat controlled with pain medications. 10+ ROS reviewed and negative except for positive in HPI.    Allergies   Allergen Reactions    Latex, Natural Rubber Contact Dermatitis    Silver Nitrate Applicators Rash     Current Facility-Administered Medications   Medication Dose Route Frequency    Saccharomyces boulardii (FLORASTOR) capsule 500 mg  500 mg Oral DAILY    insulin glargine (LANTUS) injection 20 Units  20 Units SubCUTAneous QHS    insulin lispro (HUMALOG) injection 5 Units  5 Units SubCUTAneous TIDAC    polyethylene glycol (MIRALAX) packet 17 g  17 g Oral DAILY PRN    senna-docusate (PERICOLACE) 8.6-50 mg per tablet 2 Tab  2 Tab Oral DAILY    clonazePAM (KlonoPIN) tablet 0.5 mg  0.5 mg Oral Q6H PRN    FLUoxetine (PROzac) capsule 20 mg  20 mg Oral DAILY    fluticasone propionate (FLONASE) 50 mcg/actuation nasal spray 2 Spray  2 Spray Both Nostrils DAILY    albuterol (PROVENTIL VENTOLIN) nebulizer solution 2.5 mg  2.5 mg Nebulization Q4H PRN    gabapentin (NEURONTIN) capsule 400 mg  400 mg Oral TID    levothyroxine (SYNTHROID) tablet 150 mcg  150 mcg Oral 6am    [Held by provider] lisinopril (PRINIVIL, ZESTRIL) tablet 10 mg  10 mg Oral DAILY    HYDROmorphone (PF) (DILAUDID) injection 1 mg  1 mg IntraVENous Q4H PRN    rosuvastatin (CRESTOR) tablet 20 mg  20 mg Oral QHS    traZODone (DESYREL) tablet 100 mg  100 mg Oral QHS    insulin lispro (HUMALOG) injection   SubCUTAneous AC&HS    sodium chloride (NS) flush 5-40 mL  5-40 mL IntraVENous Q8H    sodium chloride (NS) flush 5-40 mL  5-40 mL IntraVENous PRN    oxyCODONE IR (ROXICODONE) tablet 10 mg  10 mg Oral Q4H PRN    diphenhydrAMINE (BENADRYL) capsule 25 mg  25 mg Oral Q4H PRN    ondansetron (ZOFRAN) injection 4 mg  4 mg IntraVENous Q4H PRN    bisacodyl (DULCOLAX) tablet 5 mg  5 mg Oral DAILY PRN    heparin (porcine) injection 5,000 Units  5,000 Units SubCUTAneous Q8H    morphine CR (MS CONTIN) tablet 30 mg  30 mg Oral Q12H    ceFAZolin (ANCEF) 2 g/20 mL in sterile water IV syringe  2 g IntraVENous Q8H         Immunization History   Administered Date(s) Administered    TB Skin Test (PPD) Intradermal 2019     Objective:     Patient Vitals for the past 24 hrs:   Temp Pulse Resp BP SpO2   19 1456 98.2 °F (36.8 °C) 89 17 126/66 98 %   19 1120 98.1 °F (36.7 °C) 99 17 136/62 100 %   19 0815 98.2 °F (36.8 °C) 92 17 142/60 99 %   19 0626  84 18 134/62 94 %   19 0449 98 °F (36.7 °C) 80 20 116/61 95 %   19 2338 98.2 °F (36.8 °C) (!) 102 20 125/89 93 %   19  98  133/62    19 98.4 °F (36.9 °C) (!) 102 18 139/69 93 %     Temp (24hrs), Av.2 °F (36.8 °C), Min:98 °F (36.7 °C), Max:98.4 °F (36.9 °C)    Oxygen Therapy  O2 Sat (%): 98 % (19 145)  O2 Device: Room air (19)  Oxygen Therapy  O2 Sat (%): 98 % (19 145)  O2 Device: Room air (11/02/19 1842)    Physical Exam:  General:         Alert, cooperative, no distress   Lungs: No wheezing/rhonchi/rales  Cardiovascular:   RRR. No m/r/g. No pedal edema b/l. Abdomen:       S/nt/nd. Bowel sounds normal. .   Back:                   Tenderness moslty on lower back L side  Neurologic:  . No gross focal deficit. Psychiatric:         Good mood. Normal affect. DIAGNOSTIC STUDIES      Data Review:   Recent Results (from the past 24 hour(s))   GLUCOSE, POC    Collection Time: 11/04/19  4:29 PM   Result Value Ref Range    Glucose (POC) 221 (H) 65 - 100 mg/dL   GLUCOSE, POC    Collection Time: 11/04/19  8:28 PM   Result Value Ref Range    Glucose (POC) 195 (H) 65 - 100 mg/dL   CBC WITH AUTOMATED DIFF    Collection Time: 11/05/19  7:05 AM   Result Value Ref Range    WBC 16.8 (H) 4.3 - 11.1 K/uL    RBC 4.74 4.05 - 5.2 M/uL    HGB 13.8 11.7 - 15.4 g/dL    HCT 42.7 35.8 - 46.3 %    MCV 90.1 79.6 - 97.8 FL    MCH 29.1 26.1 - 32.9 PG    MCHC 32.3 31.4 - 35.0 g/dL    RDW 14.0 11.9 - 14.6 %    PLATELET 434 645 - 603 K/uL    MPV 12.1 9.4 - 12.3 FL    ABSOLUTE NRBC 0.00 0.0 - 0.2 K/uL    DF AUTOMATED      NEUTROPHILS 76 43 - 78 %    LYMPHOCYTES 15 13 - 44 %    MONOCYTES 9 4.0 - 12.0 %    EOSINOPHILS 1 0.5 - 7.8 %    BASOPHILS 0 0.0 - 2.0 %    IMMATURE GRANULOCYTES 1 0.0 - 5.0 %    ABS. NEUTROPHILS 12.7 (H) 1.7 - 8.2 K/UL    ABS. LYMPHOCYTES 2.4 0.5 - 4.6 K/UL    ABS. MONOCYTES 1.4 (H) 0.1 - 1.3 K/UL    ABS. EOSINOPHILS 0.1 0.0 - 0.8 K/UL    ABS. BASOPHILS 0.0 0.0 - 0.2 K/UL    ABS. IMM.  GRANS. 0.1 0.0 - 0.5 K/UL   GLUCOSE, POC    Collection Time: 11/05/19  7:23 AM   Result Value Ref Range    Glucose (POC) 165 (H) 65 - 100 mg/dL   PLEASE READ & DOCUMENT PPD TEST IN 48 HRS    Collection Time: 11/05/19  8:35 AM   Result Value Ref Range    PPD Negative Negative    mm Induration 0 0 - 5 mm   GLUCOSE, POC    Collection Time: 11/05/19 11:25 AM   Result Value Ref Range    Glucose (POC) 160 (H) 65 - 100 mg/dL   GLUCOSE, POC    Collection Time: 11/05/19  3:51 PM   Result Value Ref Range    Glucose (POC) 338 (H) 65 - 100 mg/dL       All Micro Results     Procedure Component Value Units Date/Time    CULTURE, BLOOD [[id number]] Collected:  11/05/19 0705    Order Status:  Completed Specimen:  Blood Updated:  11/05/19 0814    CULTURE, BLOOD [[id number]] Collected:  11/05/19 0713    Order Status:  Completed Specimen:  Blood Updated:  11/05/19 0814    CULTURE, BLOOD [[id number]]  (Abnormal) Collected:  11/02/19 2123    Order Status:  Completed Specimen:  Blood Updated:  11/05/19 0731     Special Requests: --        RIGHT  HAND       GRAM STAIN GRAM POSITIVE COCCI         AEROBIC BOTTLE POSITIVE               CRITICAL RESULT NOT CALLED DUE TO PREVIOUS NOTIFICATION OF CRITICAL RESULT WITHIN THE LAST 24 HOURS.            Culture result: STAPHYLOCOCCUS AUREUS               For Susceptibility Refer to Culture  [facility] [id number]      CULTURE, BLOOD [[id number]]  (Abnormal)  (Susceptibility) Collected:  11/02/19 2123    Order Status:  Completed Specimen:  Blood Updated:  11/05/19 0729     Special Requests: --        RIGHT  Antecubital       GRAM STAIN GRAM POSITIVE COCCI               AEROBIC AND ANAEROBIC BOTTLES                  RESULTS VERIFIED, PHONED TO AND READ BACK BY Jose Eduardo Cruz RN AT 8890 11.3.19 CW           Culture result: STAPHYLOCOCCUS AUREUS         REFER TO [facility] [address] PANEL [facility] [id number]    CULTURE, URINE [[id number]]  (Abnormal) Collected:  11/02/19 2003    Order Status:  Completed Specimen:  Cath Urine Updated:  11/05/19 0719     Special Requests: NO SPECIAL REQUESTS        Culture result:       >100,000 COLONIES/mL GRAM NEGATIVE RODS IDENTIFICATION AND SUSCEPTIBILITY TO FOLLOW                  <1,000 CFU/ML MIXED SKIN JOEL ISOLATED          BLOOD CULTURE ID PANEL [[id number]]  (Abnormal) Collected:  11/02/19 2123    Order Status:  Completed Specimen:  Blood Updated:  11/03/19 1318     Acc. no. from Micro Order [id number]     Staphylococcus DETECTED        Staphylococcus aureus DETECTED        mecA (Methicillin-Resistance Genes) NOT DETECTED        INTERPRETATION       Gram positive cocci in clusters, identified in realtime PCR as probable MSSA. Comment: RESULTS VERIFIED, PHONED TO AND READ BACK BY  Nicholas Orellana RN AT 7295 11.3.19           Clinical Consideration       Recommend discontinuing IV vancomycin starting cefazolin or nafcillin if patient not on beta-lactam therapy. Infectious Diseases Consult recommended in adult patients. THIS TEST DOES NOT REPLACE SENSITIVITY TESTING. Imaging /Procedures /Studies:    CXR Results  (Last 48 hours)    None        CT Results  (Last 48 hours)    None        No results found. Results for orders placed or performed during the hospital encounter of 19   2D ECHO COMPLETE ADULT (TTE) W OR [address] [facility]  [address] [facility], [address] [facility]  [phone]    Transthoracic Echocardiogram  2D, M-mode, Doppler, and Color Doppler    Patient: Hollie Saldivar  MR #: [id number]  : 15-Leonardo-1960  Age: 62 years  Gender: Female  Study date: 2019  Account #: [de-identified]  Height: 61 in  Weight: 145.6 lb  BSA: 1.65 mï¾²  Status:Routine  Location: [facility]  BP: 108/ 62    Allergies: LATEX, NATURAL RUBBER, SILVER NITRATE APPLICATORS    Sonographer:  Jaime Schwab, RCS  Group:  [facility]  Referring Physician:  Hakan Britton. Cee Ballard MD  Reading Physician:  Graham Toro MD    INDICATIONS: Staph Bacteremia  *Technically difficult study. Patient scanned sitting inclined due to severe  left hip and leg pain when laying on left side or supine. PROCEDURE: This was a routine study. A transthoracic echocardiogram was  performed. The study included complete 2D imaging, M-mode, complete spectral  Doppler, and color Doppler. Intravenous contrast (Definity) was administered.   Echocardiographic views were limited by restricted patient mobility and poor  patient compliance. This was a technically difficult study. LEFT VENTRICLE: Size was normal. Systolic function was normal. Ejection  fraction was estimated in the range of 60 % to 65 %. There were no regional  wall motion abnormalities. Wall thickness was normal. Left ventricular  diastolic function parameters were normal. Avg E/e': 10.35.    RIGHT VENTRICLE: The size was normal. Systolic function was normal. The  tricuspid jet envelope definition was inadequate for estimation of RV   systolic  pressure. LEFT ATRIUM: Size was normal.    RIGHT ATRIUM: Size was normal.    SYSTEMIC VEINS: IVC: The inferior vena cava was normal in size and course. The  respirophasic change in diameter was more than 50%. AORTIC VALVE: The valve was trileaflet. Leaflets exhibited mild sclerosis. Although there was no diagnostic evidence for vegetation, this study is not  adequate to completely exclude the possibility. There was no evidence for  stenosis. There was no insufficiency. MITRAL VALVE: Valve structure was normal. Although there was no diagnostic  evidence for vegetation, this study is not adequate to completely exclude the  possibility. There was no evidence for stenosis. There was no regurgitation. TRICUSPID VALVE: Not well visualized. Although there was no diagnostic   evidence  for vegetation, this study is not adequate to completely exclude the  possibility. There was no evidence for stenosis. There was no regurgitation. PULMONIC VALVE: Although there was no diagnostic evidence for vegetation,   this  study is not adequate to completely exclude the possibility. Not well  visualized. There was no evidence for stenosis. There was no insufficiency. PERICARDIUM: There was no pericardial effusion. AORTA: The root exhibited normal size.     SUMMARY:    -  Left ventricle: Systolic function was normal. Ejection fraction was  estimated in the range of 60 % to 65 %. There were no regional wall motion  abnormalities. -  Aortic valve: Although there was no diagnostic evidence for vegetation,   this  study is not adequate to completely exclude the possibility.    -  Mitral valve: Although there was no diagnostic evidence for vegetation,   this  study is not adequate to completely exclude the possibility.    -  Tricuspid valve: Although there was no diagnostic evidence for vegetation,  this study is not adequate to completely exclude the possibility. SYSTEM MEASUREMENT TABLES    2D mode  AoR Diam (2D): 3.1 cm  LA Dimension (2D): 3.7 cm  Left Atrium Systolic Volume Index; Method of Disks, Biplane; 2D mode;: 33.3  ml/m2  IVS/LVPW (2D): 1.1  IVSd (2D): 1 cm  LVIDd (2D): 5.2 cm  LVIDs (2D): 3.2 cm  LVOT Area (2D): 2.8 cm2  LVPWd (2D): 1 cm  RVIDd (2D): 2.9 cm    Unspecified Scan Mode  Peak Grad; Mean; Antegrade Flow: 9 mm[Hg]  Vmax;  Antegrade Flow: 151 cm/s  LVOT Diam: 1.9 cm    Prepared and signed by    Efra Bush MD  Signed [id number] 14:05:41         Labs and Studies from previous 24 hours have been personally reviewed by myself    ASSESSMENT      Active Hospital Problems    Diagnosis Date Noted    MSSA bacteremia 11/03/2019    Intractable low back pain 11/03/2019    Sepsis due to methicillin susceptible Staphylococcus aureus [facility] 11/03/2019    Diabetes ([facility] 11/03/2019    Chronic pain syndrome 09/24/2019     Hospital Problems as of 11/5/2019 Never Reviewed          Codes Class Noted - Resolved POA    MSSA bacteremia ICD-10-CM: R78.81  ICD-9-CM: 790.7, 041.11  11/3/2019 - Present Yes        Intractable low back pain ICD-10-CM: M54.5  ICD-9-CM: 724.2  11/3/2019 - Present Yes        * (Principal) Sepsis due to methicillin susceptible Staphylococcus aureus ([facility] [address] ICD-10-CM: A41.01  ICD-9-CM: 038.11, 995.91  11/3/2019 - Present Yes        Diabetes ([facility] (Chronic) ICD-10-CM: E11.9  ICD-9-CM: 250.00  11/3/2019 - Present Yes        Chronic pain syndrome (Chronic) ICD-10-CM: G89.4  ICD-9-CM: 338.4  9/24/2019 - Present Yes        RESOLVED: Nausea & vomiting ICD-10-CM: R11.2  ICD-9-CM: 787.01  11/3/2019 - 11/3/2019 Yes              A/P:    -MSSA bacteremia  Echo with no vegetations  Repeat BC pending  ID following  Cont IV cefazolin    -Chronic back pain  Neurosurgery planning spinal stimulator removal outpatient when patient is off abxs  Cont pain management    -DM  Uncontrolled this afternoon- nurse reported patient is eating \"junk food\". Cont Lantus and premeal coverage  Patient needs to be on DM diet.     DVT Prophylaxis: heparin  CODE Status: Full      Stephie Nino MD  11/05/19

## 2019-11-05 NOTE — PROGRESS NOTES
Rounding done every hour. Patient resting in room. No signs or symptoms of distress. No changes in status. Pain medication given per MAR. Klonopin effective for anxiety/restlessness. Patient denies any further needs at this time. Bed in low position and call light/ personal items within reach. Will continue to monitor and give bedside shift report to oncoming day shift nurse.

## 2019-11-06 ENCOUNTER — HOME HEALTH ADMISSION (OUTPATIENT)
Dept: HOME HEALTH SERVICES | Facility: HOME HEALTH | Age: 59
End: 2019-11-06

## 2019-11-06 ENCOUNTER — APPOINTMENT (OUTPATIENT)
Dept: GENERAL RADIOLOGY | Age: 59
DRG: 872 | End: 2019-11-06
Attending: FAMILY MEDICINE
Payer: MEDICARE

## 2019-11-06 ENCOUNTER — APPOINTMENT (OUTPATIENT)
Dept: GENERAL RADIOLOGY | Age: 59
DRG: 872 | End: 2019-11-06
Attending: INTERNAL MEDICINE
Payer: MEDICARE

## 2019-11-06 LAB
ACC. NO. FROM MICRO ORDER, ACCP: ABNORMAL
ANION GAP SERPL CALC-SCNC: 9 MMOL/L (ref 7–16)
BACTERIA SPEC CULT: ABNORMAL
BACTERIA SPEC CULT: ABNORMAL
BASOPHILS # BLD: 0 K/UL (ref 0–0.2)
BASOPHILS NFR BLD: 0 % (ref 0–2)
BUN SERPL-MCNC: 8 MG/DL (ref 8–23)
CALCIUM SERPL-MCNC: 9.3 MG/DL (ref 8.3–10.4)
CHLORIDE SERPL-SCNC: 94 MMOL/L (ref 98–107)
CLINICAL CONSIDERATION: ABNORMAL
CO2 SERPL-SCNC: 30 MMOL/L (ref 21–32)
CREAT SERPL-MCNC: 0.44 MG/DL (ref 0.6–1)
DIFFERENTIAL METHOD BLD: ABNORMAL
EOSINOPHIL # BLD: 0.1 K/UL (ref 0–0.8)
EOSINOPHIL NFR BLD: 1 % (ref 0.5–7.8)
ERYTHROCYTE [DISTWIDTH] IN BLOOD BY AUTOMATED COUNT: 13.8 % (ref 11.9–14.6)
EST. AVERAGE GLUCOSE BLD GHB EST-MCNC: 197 MG/DL
GLUCOSE BLD STRIP.AUTO-MCNC: 211 MG/DL (ref 65–100)
GLUCOSE BLD STRIP.AUTO-MCNC: 236 MG/DL (ref 65–100)
GLUCOSE BLD STRIP.AUTO-MCNC: 260 MG/DL (ref 65–100)
GLUCOSE BLD STRIP.AUTO-MCNC: 273 MG/DL (ref 65–100)
GLUCOSE SERPL-MCNC: 253 MG/DL (ref 65–100)
HBA1C MFR BLD: 8.5 % (ref 4.8–6)
HCT VFR BLD AUTO: 39.8 % (ref 35.8–46.3)
HGB BLD-MCNC: 13 G/DL (ref 11.7–15.4)
IMM GRANULOCYTES # BLD AUTO: 0.1 K/UL (ref 0–0.5)
IMM GRANULOCYTES NFR BLD AUTO: 1 % (ref 0–5)
INTERPRETATION: ABNORMAL
LYMPHOCYTES # BLD: 2.1 K/UL (ref 0.5–4.6)
LYMPHOCYTES NFR BLD: 18 % (ref 13–44)
MCH RBC QN AUTO: 28.4 PG (ref 26.1–32.9)
MCHC RBC AUTO-ENTMCNC: 32.7 G/DL (ref 31.4–35)
MCV RBC AUTO: 86.9 FL (ref 79.6–97.8)
MECA (METHICILLIN-RESISTANCE GENES), MRGP: NOT DETECTED
MONOCYTES # BLD: 1.2 K/UL (ref 0.1–1.3)
MONOCYTES NFR BLD: 10 % (ref 4–12)
NEUTS SEG # BLD: 8.6 K/UL (ref 1.7–8.2)
NEUTS SEG NFR BLD: 71 % (ref 43–78)
NRBC # BLD: 0 K/UL (ref 0–0.2)
PLATELET # BLD AUTO: 254 K/UL (ref 150–450)
PMV BLD AUTO: 11.8 FL (ref 9.4–12.3)
POTASSIUM SERPL-SCNC: 3.7 MMOL/L (ref 3.5–5.1)
RBC # BLD AUTO: 4.58 M/UL (ref 4.05–5.2)
SERVICE CMNT-IMP: ABNORMAL
SODIUM SERPL-SCNC: 133 MMOL/L (ref 136–145)
STAPHYLOCOCCUS AUREUS: DETECTED
STAPHYLOCOCCUS, STAPP: DETECTED
WBC # BLD AUTO: 12.1 K/UL (ref 4.3–11.1)

## 2019-11-06 PROCEDURE — 77010033678 HC OXYGEN DAILY

## 2019-11-06 PROCEDURE — 74011250637 HC RX REV CODE- 250/637: Performed by: FAMILY MEDICINE

## 2019-11-06 PROCEDURE — 74011000250 HC RX REV CODE- 250: Performed by: FAMILY MEDICINE

## 2019-11-06 PROCEDURE — 82962 GLUCOSE BLOOD TEST: CPT

## 2019-11-06 PROCEDURE — 74011250636 HC RX REV CODE- 250/636: Performed by: FAMILY MEDICINE

## 2019-11-06 PROCEDURE — 74011250637 HC RX REV CODE- 250/637: Performed by: INTERNAL MEDICINE

## 2019-11-06 PROCEDURE — 74011636637 HC RX REV CODE- 636/637: Performed by: INTERNAL MEDICINE

## 2019-11-06 PROCEDURE — 73130 X-RAY EXAM OF HAND: CPT

## 2019-11-06 PROCEDURE — 74011636637 HC RX REV CODE- 636/637: Performed by: FAMILY MEDICINE

## 2019-11-06 PROCEDURE — 02HV33Z INSERTION OF INFUSION DEVICE INTO SUPERIOR VENA CAVA, PERCUTANEOUS APPROACH: ICD-10-PCS | Performed by: INTERNAL MEDICINE

## 2019-11-06 PROCEDURE — 94640 AIRWAY INHALATION TREATMENT: CPT

## 2019-11-06 PROCEDURE — 65270000029 HC RM PRIVATE

## 2019-11-06 PROCEDURE — 83036 HEMOGLOBIN GLYCOSYLATED A1C: CPT

## 2019-11-06 PROCEDURE — 80048 BASIC METABOLIC PNL TOTAL CA: CPT

## 2019-11-06 PROCEDURE — 74011250636 HC RX REV CODE- 250/636: Performed by: INTERNAL MEDICINE

## 2019-11-06 PROCEDURE — 36573 INSJ PICC RS&I 5 YR+: CPT | Performed by: INTERNAL MEDICINE

## 2019-11-06 PROCEDURE — 94760 N-INVAS EAR/PLS OXIMETRY 1: CPT

## 2019-11-06 PROCEDURE — 85025 COMPLETE CBC W/AUTO DIFF WBC: CPT

## 2019-11-06 PROCEDURE — 77030013140 HC MSK NEB VYRM -A

## 2019-11-06 PROCEDURE — 36415 COLL VENOUS BLD VENIPUNCTURE: CPT

## 2019-11-06 PROCEDURE — 71046 X-RAY EXAM CHEST 2 VIEWS: CPT

## 2019-11-06 RX ORDER — INSULIN GLARGINE 100 [IU]/ML
25 INJECTION, SOLUTION SUBCUTANEOUS
Status: DISCONTINUED | OUTPATIENT
Start: 2019-11-06 | End: 2019-11-09

## 2019-11-06 RX ORDER — IBUPROFEN 200 MG
1 TABLET ORAL EVERY 24 HOURS
Status: DISCONTINUED | OUTPATIENT
Start: 2019-11-06 | End: 2019-11-11 | Stop reason: HOSPADM

## 2019-11-06 RX ADMIN — HEPARIN SODIUM 5000 UNITS: 5000 INJECTION INTRAVENOUS; SUBCUTANEOUS at 05:40

## 2019-11-06 RX ADMIN — FLUTICASONE PROPIONATE 2 SPRAY: 50 SPRAY, METERED NASAL at 08:40

## 2019-11-06 RX ADMIN — Medication 10 ML: at 05:41

## 2019-11-06 RX ADMIN — Medication 10 ML: at 22:06

## 2019-11-06 RX ADMIN — HYDROMORPHONE HYDROCHLORIDE 1 MG: 1 INJECTION, SOLUTION INTRAMUSCULAR; INTRAVENOUS; SUBCUTANEOUS at 08:44

## 2019-11-06 RX ADMIN — ROSUVASTATIN CALCIUM 20 MG: 20 TABLET, FILM COATED ORAL at 21:51

## 2019-11-06 RX ADMIN — GABAPENTIN 400 MG: 400 CAPSULE ORAL at 08:37

## 2019-11-06 RX ADMIN — Medication 2 G: at 21:51

## 2019-11-06 RX ADMIN — CLONAZEPAM 0.5 MG: 0.5 TABLET ORAL at 15:41

## 2019-11-06 RX ADMIN — SENNOSIDES AND DOCUSATE SODIUM 2 TABLET: 8.6; 5 TABLET ORAL at 08:37

## 2019-11-06 RX ADMIN — GABAPENTIN 400 MG: 400 CAPSULE ORAL at 21:51

## 2019-11-06 RX ADMIN — MORPHINE SULFATE 30 MG: 30 TABLET, FILM COATED, EXTENDED RELEASE ORAL at 15:41

## 2019-11-06 RX ADMIN — OXYCODONE HYDROCHLORIDE 10 MG: 5 TABLET ORAL at 11:28

## 2019-11-06 RX ADMIN — CLONAZEPAM 0.5 MG: 0.5 TABLET ORAL at 03:31

## 2019-11-06 RX ADMIN — OXYCODONE HYDROCHLORIDE 10 MG: 5 TABLET ORAL at 05:39

## 2019-11-06 RX ADMIN — HEPARIN SODIUM 5000 UNITS: 5000 INJECTION INTRAVENOUS; SUBCUTANEOUS at 21:51

## 2019-11-06 RX ADMIN — HEPARIN SODIUM 5000 UNITS: 5000 INJECTION INTRAVENOUS; SUBCUTANEOUS at 14:29

## 2019-11-06 RX ADMIN — Medication 2 G: at 03:25

## 2019-11-06 RX ADMIN — INSULIN LISPRO 6 UNITS: 100 INJECTION, SOLUTION INTRAVENOUS; SUBCUTANEOUS at 21:56

## 2019-11-06 RX ADMIN — INSULIN LISPRO 4 UNITS: 100 INJECTION, SOLUTION INTRAVENOUS; SUBCUTANEOUS at 17:17

## 2019-11-06 RX ADMIN — Medication 2 G: at 11:28

## 2019-11-06 RX ADMIN — MORPHINE SULFATE 30 MG: 30 TABLET, FILM COATED, EXTENDED RELEASE ORAL at 03:19

## 2019-11-06 RX ADMIN — GABAPENTIN 400 MG: 400 CAPSULE ORAL at 17:16

## 2019-11-06 RX ADMIN — ALBUTEROL SULFATE 2.5 MG: 2.5 SOLUTION RESPIRATORY (INHALATION) at 19:48

## 2019-11-06 RX ADMIN — FLUOXETINE 20 MG: 20 CAPSULE ORAL at 08:38

## 2019-11-06 RX ADMIN — INSULIN LISPRO 5 UNITS: 100 INJECTION, SOLUTION INTRAVENOUS; SUBCUTANEOUS at 07:23

## 2019-11-06 RX ADMIN — ONDANSETRON 4 MG: 2 INJECTION INTRAMUSCULAR; INTRAVENOUS at 15:17

## 2019-11-06 RX ADMIN — INSULIN LISPRO 6 UNITS: 100 INJECTION, SOLUTION INTRAVENOUS; SUBCUTANEOUS at 07:23

## 2019-11-06 RX ADMIN — OXYCODONE HYDROCHLORIDE 10 MG: 5 TABLET ORAL at 18:23

## 2019-11-06 RX ADMIN — HYDROMORPHONE HYDROCHLORIDE 1 MG: 1 INJECTION, SOLUTION INTRAMUSCULAR; INTRAVENOUS; SUBCUTANEOUS at 21:50

## 2019-11-06 RX ADMIN — Medication 10 ML: at 14:29

## 2019-11-06 RX ADMIN — INSULIN LISPRO 5 UNITS: 100 INJECTION, SOLUTION INTRAVENOUS; SUBCUTANEOUS at 17:16

## 2019-11-06 RX ADMIN — LEVOTHYROXINE SODIUM 150 MCG: 100 TABLET ORAL at 05:40

## 2019-11-06 RX ADMIN — TRAZODONE HYDROCHLORIDE 100 MG: 50 TABLET ORAL at 21:51

## 2019-11-06 RX ADMIN — INSULIN LISPRO 4 UNITS: 100 INJECTION, SOLUTION INTRAVENOUS; SUBCUTANEOUS at 11:45

## 2019-11-06 RX ADMIN — HYDROMORPHONE HYDROCHLORIDE 1 MG: 1 INJECTION, SOLUTION INTRAMUSCULAR; INTRAVENOUS; SUBCUTANEOUS at 14:29

## 2019-11-06 RX ADMIN — INSULIN LISPRO 5 UNITS: 100 INJECTION, SOLUTION INTRAVENOUS; SUBCUTANEOUS at 11:48

## 2019-11-06 RX ADMIN — INSULIN GLARGINE 25 UNITS: 100 INJECTION, SOLUTION SUBCUTANEOUS at 21:51

## 2019-11-06 RX ADMIN — Medication 500 MG: at 08:37

## 2019-11-06 NOTE — PROGRESS NOTES
Call from VAT, per KINZA Gonzalez, will hold on PICC placement until ID clears due to positive blood cultures.

## 2019-11-06 NOTE — PROGRESS NOTES
Interdisciplinary Rounds completed 11/06/19. Nursing, Case Management, Physician and PT present. Plan of care reviewed and updated. Pt will discharge with PICC line and outpt IV antibiotics.

## 2019-11-06 NOTE — PROGRESS NOTES
Critical result called for Gram + cocci found in aerobic bottle pulled on 11/5 Rt hand. Dr Alfred Hill. No orders received at this time.

## 2019-11-06 NOTE — PROGRESS NOTES
Infectious Disease Note    Today's Date: 2019   Admit Date: 2019    Impression:   · MSSA bacteremia (, ). TTE NG but technically difficult  · Exacerbation of chronic back pain in the setting of lumbar hardware and spinal cord stimulator. Unable to perform MRI due to [facility]  · New R 5th MC swelling and pain-worse     Plan:   · Continue cefazolin 2g IV q8h. For now, will not pursue ALLI as duration will not change. May reassess if BCs persistently +  · Repeat BCs again. · Duration: 6-8 week and then make a decision about extending past that. 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 Ideally, would like removal during abx course. We do not know for sure if SCC is infected but removal would help by allowing MRIs to locate infectious sites. · Will ask for ortho hand surgery to evaluate, likely infected with worsening on abx. Melo Dalton, pt's daughter, is a [employer] RN. Cell number [phone]  Anti-infectives:   · Ceftriaxone (19 - 11/3/19)  · Vancomycin (11/3/19)  · Cefazolin (11/3 - )    Subjective:     Afebrile. More R hand pain and swelling today. Back pain the same. Daughter and  in room   Allergies   Allergen Reactions    Latex, Natural Rubber Contact Dermatitis    Silver Nitrate Applicators Rash        Review of Systems:  A comprehensive review of systems was negative except for that written in the History of Present Illness. Objective:     Visit Vitals  /68 (BP 1 Location: Left arm, BP Patient Position: Sitting)   Pulse 85   Temp 98.2 °F (36.8 °C)   Resp 18   Ht 5' 1\" (1.549 m)   Wt 66.2 kg (146 lb)   SpO2 92%   BMI 27.59 kg/m²     Temp (24hrs), Av.5 °F (36.9 °C), Min:98 °F (36.7 °C), Max:98.8 °F (37.1 °C)       Lines:  Peripheral IV:       Physical Exam:    General:  Alert, cooperative, appears stated age; in discomfort, but walking around in the room   Eyes:  Sclera anicteric. Pupils equally round and reactive to light.    Mouth/Throat: Mucous membranes normal, oral pharynx clear   Neck: Supple   Lungs:   Clear to auscultation bilaterally, good effort   CV:  Regular rate and rhythm,no murmur, click, rub or gallop   Abdomen:   Soft, non-tender. bowel sounds normal. non-distended   Extremities: No cyanosis or edema   Skin: Skin color, texture, turgor normal. no acute rash or lesions   Lymph nodes: Cervical and supraclavicular normal   Musculoskeletal: No swelling or deformity. Progressive swelling with erythema to R 5 MC. Skin indurated. Lines/Devices:  Intact, no erythema, drainage or tenderness   Psych:  Back:  Alert and oriented, normal mood affect given the setting  Spinal stimulator generator site and lumbar surgical site look benign               Data Review:     CBC:  Recent Labs     11/06/19 0653 11/05/19 0705 11/04/19  0609   WBC 12.1* 16.8* 18.3*   GRANS 71 76 81*   MONOS 10 9 8   EOS 1 1 0*   ANEU 8.6* 12.7* 14.8*   ABL 2.1 2.4 1.9   HGB 13.0 13.8 12.5   HCT 39.8 42.7 39.2    254 232       BMP:  Recent Labs     11/06/19 0653   CREA 0.44*   BUN 8   *   K 3.7   CL 94*   CO2 30   AGAP 9   *       LFTS:  No results for input(s): TBILI, ALT, SGOT, AP, TP, ALB in the last 72 hours. Microbiology:     All Micro Results     Procedure Component Value Units Date/Time    CULTURE, BLOOD [[id number]] Collected:  11/05/19 0713    Order Status:  Completed Specimen:  Blood Updated:  11/06/19 1014     Special Requests: --        LEFT  HAND       GRAM STAIN       GRAM POS COCCI IN CLUSTERS            AEROBIC BOTTLE POSITIVE               CRITICAL RESULT NOT CALLED DUE TO PREVIOUS NOTIFICATION OF CRITICAL RESULT WITHIN THE LAST 24 HOURS.            Culture result:       CULTURE IN PROGRESS,FURTHER UPDATES TO FOLLOW          CULTURE, URINE [[id number]]  (Abnormal)  (Susceptibility) Collected:  11/02/19 2003    Order Status:  Completed Specimen:  Cath Urine Updated:  11/06/19 0724     Special Requests: NO SPECIAL REQUESTS        Culture result:       >100,000 COLONIES/mL KLEBSIELLA PNEUMONIAE                  <1,000 CFU/ML MIXED SKIN JOEL ISOLATED          CULTURE, BLOOD [[id number]] Collected:  11/05/19 0705    Order Status:  Completed Specimen:  Blood Updated:  11/06/19 0654     Special Requests: --        RIGHT  HAND       GRAM STAIN GRAM POSITIVE COCCI         AEROBIC BOTTLE POSITIVE               RESULTS VERIFIED, PHONED TO AND READ BACK BY Lillian Reza RN @ 2541 ON [id number] BY TVO           Culture result:       CULTURE IN [address] UPDATES TO FOLLOW            REFER TO BLOOD CULTURE IDENTIFICATION PANEL [id number]    BLOOD CULTURE ID PANEL [[id number]]  (Abnormal) Collected:  11/05/19 0705    Order Status:  Completed Specimen:  Blood Updated:  11/06/19 0651     Acc. no. from Micro Order [id number]     Staphylococcus DETECTED        Staphylococcus aureus DETECTED        mecA (Methicillin-Resistance Genes) NOT DETECTED        INTERPRETATION       Gram positive cocci in clusters, identified in realtime PCR as probable MSSA. Clinical Consideration       Recommend discontinuing IV vancomycin starting cefazolin or nafcillin if patient not on beta-lactam therapy. Infectious Diseases Consult recommended in adult patients. THIS TEST DOES NOT REPLACE SENSITIVITY TESTING. CULTURE, BLOOD [[id number]]  (Abnormal) Collected:  11/02/19 2123    Order Status:  Completed Specimen:  Blood Updated:  11/05/19 0731     Special Requests: --        RIGHT  HAND       GRAM STAIN GRAM POSITIVE COCCI         AEROBIC BOTTLE POSITIVE               CRITICAL RESULT NOT CALLED DUE TO PREVIOUS NOTIFICATION OF CRITICAL RESULT WITHIN THE LAST 24 HOURS.            Culture result: STAPHYLOCOCCUS AUREUS               For Susceptibility Refer to Culture  [facility] [id number]      CULTURE, BLOOD [[id number]]  (Abnormal)  (Susceptibility) Collected:  11/02/19 2123    Order Status:  Completed Specimen:  Blood Updated:  11/05/19 0729     Special Requests: --        RIGHT  Antecubital       Garland Plump STAIN GRAM POSITIVE COCCI               AEROBIC AND ANAEROBIC BOTTLES                  RESULTS VERIFIED, PHONED TO AND READ BACK BY Екатерина Borden RN AT 3046 11.3.19 CW           Culture result: STAPHYLOCOCCUS AUREUS         REFER TO [address] PANEL [address] [id number]    BLOOD CULTURE ID PANEL [[id number]]  (Abnormal) Collected:  11/02/19 2123    Order Status:  Completed Specimen:  Blood Updated:  11/03/19 1318     Acc. no. from Micro Order [id number]     Staphylococcus DETECTED        Staphylococcus aureus DETECTED        mecA (Methicillin-Resistance Genes) NOT DETECTED        INTERPRETATION       Gram positive cocci in clusters, identified in realtime PCR as probable MSSA. Comment: RESULTS VERIFIED, PHONED TO AND READ BACK BY  Екатерина Borden RN AT 4975 11.3.19 CW          Clinical Consideration       Recommend discontinuing IV vancomycin starting cefazolin or nafcillin if patient not on beta-lactam therapy. Infectious Diseases Consult recommended in adult patients. THIS TEST DOES NOT REPLACE SENSITIVITY TESTING. Imaging:   CT abd/pelvis (11/3/19)  IMPRESSION: No acute finding abdomen and pelvis    CXR (11/2/19)  IMPRESSION: No acute process    CT spine (11/1/19)  IMPRESSION:  1. Postoperative changes noted L3-L5. 2. Moderate bilateral neural foraminal narrowing present at L4-L5 and L5-S1  3. Mild bilateral neural foraminal narrowing noted at L3-L4.     CT myelogram (11/1/19)   Contrast readily flows throughout the lumbar region      Signed By: Thelma Collier NP     November 6, 2019

## 2019-11-06 NOTE — PROGRESS NOTES
Met with patient and patient's  at bedside.  is Davi Cuenca, [phone]. Discussed intramed pricing that liaison provided to CM. discussed that iv abt would cost about 59.83 weekly and this cost would be due weekly at delivery. Also discussed that supplies would cost about 12/day and that liaison stating they would bill [employer] insurance first and then could setup payment plan for this cost. Patient and  are agreeable to pricing. Message left with intramed liaison. Awaiting response. Family also agreeable to [facility] services. Would like referral sent to [facility]. Referral sent. liaisons notified.

## 2019-11-06 NOTE — PROGRESS NOTES
Call to Dr. Kiko Méndez, informed pt requesting Nicotine patch, smokes 1.5 packs per day. New order received Nicotine patch 21mg daily.

## 2019-11-06 NOTE — PROGRESS NOTES
[address]  Face to Face Encounter    Patients Name: Gianni Blas    YOB: 1960    Ordering Physician: Dr. Scot Cummings     Primary Diagnosis: Intractable pain [R52]  Intractable low back pain [M54.5]    Date of Face to Face:   11/6/2019                                  Face to Face Encounter findings are related to primary reason for home care:   yes. 1. I certify that the patient needs intermittent care as follows: skilled nursing care:  skilled observation/assessment, patient education, administration of medications and rehabilitative nursing  physical therapy: strengthening, stretching/ROM, transfer training, gait/stair training, balance training and pt/caregiver education  occupational therapy:  ADL safety (ie. cooking, bathing, dressing), ROM and pt/caregiver education    2. I certify that this patient is homebound, that is: 1) patient requires the use of a  device, special transportation, or assistance of another to leave the home; or 2) patient's condition makes leaving the home medically contraindicated; and 3) patient has a normal inability to leave the home and leaving the home requires considerable and taxing effort. Patient may leave the home for infrequent and short duration for medical reasons, and occasional absences for non-medical reasons. Homebound status is due to the following functional limitations: Patient with strength deficits limiting the performance of all ADL's without caregiver assistance or the use of an assistive device. Patient with poor safety awareness and is at risk for falls without assistance of another person and the use of an assistive device. Patient with poor ambulation endurance limiting their safe ability to ascend/descend the required number of steps to leave the home.     3. I certify that this patient is under my care and that I, or a nurse practitioner or [phone], or clinical nurse specialist, or certified nurse midwife, working with me, had a Face-to-Face Encounter that meets the physician Face-to-Face Encounter requirements. The following are the clinical findings from the [address] [place] [address] encounter that support the need for skilled services and is a summary of the encounter: See Hospital Chart. See Hospital Chart. Stephanie Au  11/6/2019      THE FOLLOWING TO BE COMPLETED BY THE COMMUNITY PHYSICIAN:    I concur with the findings described above from the F2F encounter that this patient is homebound and in need of a skilled service.     Certifying Physician: _____________________________________      Printed Certifying Physician Name: _____________________________________    Date: _________________

## 2019-11-06 NOTE — PROGRESS NOTES
Hourly rounds performed;all pt needs met. Pain medication administered per MAR. Bed in low position and call light/ personal items within reach. Will continue to monitor and give bedside shift report to oncoming day shift nurse.

## 2019-11-06 NOTE — PROGRESS NOTES
Hospitalist Progress Note    2019  Admit Date: 2019  7:34 PM   NAME: Álvaro Mac   :  1960   DOS:              19  MRN:  [id number]   Attending: Adilene Johnson MD  PCP:  Filomena Henry MD  Treatment Team: Attending Provider: Cleopatra Huston MD; Utilization Review: Berny Brooke; Consulting Provider: Will De La Cruz MD; Care Manager: Mera Germain LMSW; Primary Nurse: Bettie Goodwin RN    Full Code     SUBJECTIVE:   As previously documented: 59yo F wit hx anxiety/depression, DM, factor 5, PE, and chronic low back pain who presented with acute worsening of back pain. Has 20y hx back pain and follows pain mgmt and neurosurgery. Has stimulator implanted in back.  At a baseline, is functional for ADLs, takes MS contin 15 and prn norco.  c/o 5 days of severe worsening of low back pain radiating into L hip/glute area. fevers of up to 103 for several days, breaks with sweating, then feels better for a few days. Patient unable to have MRI due to spinal simulator. 19    Álvaro Mac reported having tenderness and swelling R hand. Reported having some tenderness before coming to the hospital but not like this. 10+ ROS reviewed and negative except for positive in HPI.    Allergies   Allergen Reactions    Latex, Natural Rubber Contact Dermatitis    Silver Nitrate Applicators Rash     Current Facility-Administered Medications   Medication Dose Route Frequency    Saccharomyces boulardii (FLORASTOR) capsule 500 mg  500 mg Oral DAILY    insulin glargine (LANTUS) injection 20 Units  20 Units SubCUTAneous QHS    insulin lispro (HUMALOG) injection 5 Units  5 Units SubCUTAneous TIDAC    polyethylene glycol (MIRALAX) packet 17 g  17 g Oral DAILY PRN    senna-docusate (PERICOLACE) 8.6-50 mg per tablet 2 Tab  2 Tab Oral DAILY    clonazePAM (KlonoPIN) tablet 0.5 mg  0.5 mg Oral Q6H PRN    FLUoxetine (PROzac) capsule 20 mg  20 mg Oral DAILY  fluticasone propionate (FLONASE) 50 mcg/actuation nasal spray 2 Spray  2 Spray Both Nostrils DAILY    albuterol (PROVENTIL VENTOLIN) nebulizer solution 2.5 mg  2.5 mg Nebulization Q4H PRN    gabapentin (NEURONTIN) capsule 400 mg  400 mg Oral TID    levothyroxine (SYNTHROID) tablet 150 mcg  150 mcg Oral 6am    [Held by provider] lisinopril (PRINIVIL, ZESTRIL) tablet 10 mg  10 mg Oral DAILY    HYDROmorphone (PF) (DILAUDID) injection 1 mg  1 mg IntraVENous Q4H PRN    rosuvastatin (CRESTOR) tablet 20 mg  20 mg Oral QHS    traZODone (DESYREL) tablet 100 mg  100 mg Oral QHS    insulin lispro (HUMALOG) injection   SubCUTAneous AC&HS    sodium chloride (NS) flush 5-40 mL  5-40 mL IntraVENous Q8H    sodium chloride (NS) flush 5-40 mL  5-40 mL IntraVENous PRN    oxyCODONE IR (ROXICODONE) tablet 10 mg  10 mg Oral Q4H PRN    diphenhydrAMINE (BENADRYL) capsule 25 mg  25 mg Oral Q4H PRN    ondansetron (ZOFRAN) injection 4 mg  4 mg IntraVENous Q4H PRN    bisacodyl (DULCOLAX) tablet 5 mg  5 mg Oral DAILY PRN    heparin (porcine) injection 5,000 Units  5,000 Units SubCUTAneous Q8H    morphine CR (MS CONTIN) tablet 30 mg  30 mg Oral Q12H    ceFAZolin (ANCEF) 2 g/20 mL in sterile water IV syringe  2 g IntraVENous Q8H         Immunization History   Administered Date(s) Administered    TB Skin Test (PPD) Intradermal 2019     Objective:     Patient Vitals for the past 24 hrs:   Temp Pulse Resp BP SpO2   19 1143 98.2 °F (36.8 °C) 85 18 105/68 92 %   19 0738 98.7 °F (37.1 °C) 88 18 117/70 91 %   19 0324 98.8 °F (37.1 °C) (!) 104 18 125/79 92 %   19 2356 98.8 °F (37.1 °C) 98 18 141/87 90 %   19 2232  93 18 148/88 94 %   19 98 °F (36.7 °C) 91 18 140/77 91 %     Temp (24hrs), Av.5 °F (36.9 °C), Min:98 °F (36.7 °C), Max:98.8 °F (37.1 °C)    Oxygen Therapy  O2 Sat (%): 92 % (19 1143)  O2 Device: Room air (19 1842)  Oxygen Therapy  O2 Sat (%): 92 % (19 1143)  O2 Device: Room air (11/02/19 3462)    Physical Exam:  General:         Alert, cooperative, no distress   Lungs: No wheezing/rhonchi/rales  Cardiovascular:   RRR. No m/r/g. No pedal edema b/l. Abdomen:       S/nt/nd. Bowel sounds normal. .   Back:                   Tenderness moslty on lower back L side  Neurologic:  . No gross focal deficit. Extremities:         R hand with tenderness and erythema mostly of medial aspect of the hand. No lesions appreciated. DIAGNOSTIC STUDIES      Data Review:   Recent Results (from the past 24 hour(s))   GLUCOSE, POC    Collection Time: 11/05/19  3:51 PM   Result Value Ref Range    Glucose (POC) 338 (H) 65 - 100 mg/dL   GLUCOSE, POC    Collection Time: 11/05/19  8:53 PM   Result Value Ref Range    Glucose (POC) 181 (H) 65 - 100 mg/dL   CBC WITH AUTOMATED DIFF    Collection Time: 11/06/19  6:53 AM   Result Value Ref Range    WBC 12.1 (H) 4.3 - 11.1 K/uL    RBC 4.58 4.05 - 5.2 M/uL    HGB 13.0 11.7 - 15.4 g/dL    HCT 39.8 35.8 - 46.3 %    MCV 86.9 79.6 - 97.8 FL    MCH 28.4 26.1 - 32.9 PG    MCHC 32.7 31.4 - 35.0 g/dL    RDW 13.8 11.9 - 14.6 %    PLATELET 996 324 - 613 K/uL    MPV 11.8 9.4 - 12.3 FL    ABSOLUTE NRBC 0.00 0.0 - 0.2 K/uL    DF AUTOMATED      NEUTROPHILS 71 43 - 78 %    LYMPHOCYTES 18 13 - 44 %    MONOCYTES 10 4.0 - 12.0 %    EOSINOPHILS 1 0.5 - 7.8 %    BASOPHILS 0 0.0 - 2.0 %    IMMATURE GRANULOCYTES 1 0.0 - 5.0 %    ABS. NEUTROPHILS 8.6 (H) 1.7 - 8.2 K/UL    ABS. LYMPHOCYTES 2.1 0.5 - 4.6 K/UL    ABS. MONOCYTES 1.2 0.1 - 1.3 K/UL    ABS. EOSINOPHILS 0.1 0.0 - 0.8 K/UL    ABS. BASOPHILS 0.0 0.0 - 0.2 K/UL    ABS. IMM.  GRANS. 0.1 0.0 - 0.5 K/UL   METABOLIC PANEL, BASIC    Collection Time: 11/06/19  6:53 AM   Result Value Ref Range    Sodium 133 (L) 136 - 145 mmol/L    Potassium 3.7 3.5 - 5.1 mmol/L    Chloride 94 (L) 98 - 107 mmol/L    CO2 30 21 - 32 mmol/L    Anion gap 9 7 - 16 mmol/L    Glucose 253 (H) 65 - 100 mg/dL    BUN 8 8 - 23 MG/DL    Creatinine 0.44 (L) 0.6 - 1.0 MG/DL    GFR est AA >60 >60 ml/min/1.73m2    GFR est non-AA >60 >60 ml/min/1.73m2    Calcium 9.3 8.3 - 10.4 MG/DL   HEMOGLOBIN A1C WITH EAG    Collection Time: 11/06/19  6:53 AM   Result Value Ref Range    Hemoglobin A1c 8.5 (H) 4.8 - 6.0 %    Est. average glucose 197 mg/dL   GLUCOSE, POC    Collection Time: 11/06/19  7:13 AM   Result Value Ref Range    Glucose (POC) 260 (H) 65 - 100 mg/dL   GLUCOSE, POC    Collection Time: 11/06/19 11:39 AM   Result Value Ref Range    Glucose (POC) 236 (H) 65 - 100 mg/dL       All Micro Results     Procedure Component Value Units Date/Time    CULTURE, BLOOD [[id number]] Collected:  11/05/19 0713    Order Status:  Completed Specimen:  Blood Updated:  11/06/19 1014     Special Requests: --        LEFT  HAND       GRAM STAIN       GRAM POS COCCI IN CLUSTERS            AEROBIC BOTTLE POSITIVE               CRITICAL RESULT NOT CALLED DUE TO PREVIOUS NOTIFICATION OF CRITICAL RESULT WITHIN THE LAST 24 HOURS.            Culture result:       CULTURE IN PROGRESS,FURTHER UPDATES TO FOLLOW          CULTURE, URINE [[id number]]  (Abnormal)  (Susceptibility) Collected:  11/02/19 2003    Order Status:  Completed Specimen:  Cath Urine Updated:  11/06/19 0724     Special Requests: NO SPECIAL REQUESTS        Culture result:       >100,000 COLONIES/mL KLEBSIELLA PNEUMONIAE                  <1,000 CFU/ML MIXED SKIN JOEL ISOLATED          CULTURE, BLOOD [[id number]] Collected:  11/05/19 0705    Order Status:  Completed Specimen:  Blood Updated:  11/06/19 0654     Special Requests: --        RIGHT  HAND       GRAM STAIN GRAM POSITIVE COCCI         AEROBIC BOTTLE POSITIVE               RESULTS VERIFIED, PHONED TO AND READ BACK BY Joann Cushing RN @ 5127 ON [id number] BY TVO           Culture result:       CULTURE IN [address] UPDATES TO FOLLOW            REFER TO BLOOD CULTURE IDENTIFICATION PANEL [id number]    BLOOD CULTURE ID PANEL [[id number]]  (Abnormal) Collected:  11/05/19 0705    Order Status:  Completed Specimen:  Blood Updated:  11/06/19 0651     Acc. no. from Micro Order [id number]     Staphylococcus DETECTED        Staphylococcus aureus DETECTED        mecA (Methicillin-Resistance Genes) NOT DETECTED        INTERPRETATION       Gram positive cocci in clusters, identified in realtime PCR as probable MSSA. Clinical Consideration       Recommend discontinuing IV vancomycin starting cefazolin or nafcillin if patient not on beta-lactam therapy. Infectious Diseases Consult recommended in adult patients. THIS TEST DOES NOT REPLACE SENSITIVITY TESTING. CULTURE, BLOOD [[id number]]  (Abnormal) Collected:  11/02/19 2123    Order Status:  Completed Specimen:  Blood Updated:  11/05/19 0731     Special Requests: --        RIGHT  HAND       GRAM STAIN GRAM POSITIVE COCCI         AEROBIC BOTTLE POSITIVE               CRITICAL RESULT NOT CALLED DUE TO PREVIOUS NOTIFICATION OF CRITICAL RESULT WITHIN THE LAST 24 HOURS.            Culture result: STAPHYLOCOCCUS AUREUS               For Susceptibility Refer to Culture  [facility] [id number]      CULTURE, BLOOD [[id number]]  (Abnormal)  (Susceptibility) Collected:  11/02/19 2123    Order Status:  Completed Specimen:  Blood Updated:  11/05/19 0729     Special Requests: --        RIGHT  Antecubital       GRAM STAIN GRAM POSITIVE COCCI               AEROBIC AND ANAEROBIC BOTTLES                  RESULTS VERIFIED, PHONED TO AND READ BACK BY Maxx Diego RN AT 9474 11.3.19 CW           Culture result: STAPHYLOCOCCUS AUREUS         REFER TO [address] PANEL [facility] [id number]    BLOOD CULTURE ID PANEL [[id number]]  (Abnormal) Collected:  11/02/19 2123    Order Status:  Completed Specimen:  Blood Updated:  11/03/19 1318     Acc. no. from Micro Order [id number]     Staphylococcus DETECTED        Staphylococcus aureus DETECTED        mecA (Methicillin-Resistance Genes) NOT DETECTED        INTERPRETATION       Gram positive cocci in clusters, identified in realtime PCR as probable MSSA. Comment: RESULTS VERIFIED, PHONED TO AND READ BACK BY  Spencer Ball RN AT 1940 11.3.19 CW          Clinical Consideration       Recommend discontinuing IV vancomycin starting cefazolin or nafcillin if patient not on beta-lactam therapy. Infectious Diseases Consult recommended in adult patients. THIS TEST DOES NOT REPLACE SENSITIVITY TESTING. Imaging /Procedures /Studies:    CXR Results  (Last 48 hours)    None        CT Results  (Last 48 hours)    None        No results found. Results for orders placed or performed during the hospital encounter of 19   2D ECHO COMPLETE ADULT (TTE) W OR [address] [facility] [address] [facility], [address] [facility]  [phone]    Transthoracic Echocardiogram  2D, M-mode, Doppler, and Color Doppler    Patient: Viri Davis  MR #: [id number]  : 15-Leonardo-1960  Age: 62 years  Gender: Female  Study date: 2019  Account #: [de-identified]  Height: 61 in  Weight: 145.6 lb  BSA: 1.65 mï¾²  Status:Routine  Location: [facility]  BP: 108/ 62    Allergies: LATEX, NATURAL RUBBER, SILVER NITRATE APPLICATORS    Sonographer:  Taylor Murillo [facility]  Group:  [id number] Cardiology  Referring Physician:  Gretchen Blizzard. Guido Cruz MD  Reading Physician:  Beni Fuller MD    INDICATIONS: Staph Bacteremia  *Technically difficult study. Patient scanned sitting inclined due to severe  left hip and leg pain when laying on left side or supine. PROCEDURE: This was a routine study. A transthoracic echocardiogram was  performed. The study included complete 2D imaging, M-mode, complete spectral  Doppler, and color Doppler. Intravenous contrast (Definity) was administered. Echocardiographic views were limited by restricted patient mobility and poor  patient compliance. This was a technically difficult study.     LEFT VENTRICLE: Size was normal. Systolic function was normal. Ejection  fraction was estimated in the range of 60 % to 65 %. There were no regional  wall motion abnormalities. Wall thickness was normal. Left ventricular  diastolic function parameters were normal. Avg E/e': 10.35.    RIGHT VENTRICLE: The size was normal. Systolic function was normal. The  tricuspid jet envelope definition was inadequate for estimation of RV   systolic  pressure. LEFT ATRIUM: Size was normal.    RIGHT ATRIUM: Size was normal.    SYSTEMIC VEINS: IVC: The inferior vena cava was normal in size and course. The  respirophasic change in diameter was more than 50%. AORTIC VALVE: The valve was trileaflet. Leaflets exhibited mild sclerosis. Although there was no diagnostic evidence for vegetation, this study is not  adequate to completely exclude the possibility. There was no evidence for  stenosis. There was no insufficiency. MITRAL VALVE: Valve structure was normal. Although there was no diagnostic  evidence for vegetation, this study is not adequate to completely exclude the  possibility. There was no evidence for stenosis. There was no regurgitation. TRICUSPID VALVE: Not well visualized. Although there was no diagnostic   evidence  for vegetation, this study is not adequate to completely exclude the  possibility. There was no evidence for stenosis. There was no regurgitation. PULMONIC VALVE: Although there was no diagnostic evidence for vegetation,   this  study is not adequate to completely exclude the possibility. Not well  visualized. There was no evidence for stenosis. There was no insufficiency. PERICARDIUM: There was no pericardial effusion. AORTA: The root exhibited normal size. SUMMARY:    -  Left ventricle: Systolic function was normal. Ejection fraction was  estimated in the range of 60 % to 65 %. There were no regional wall motion  abnormalities. -  Aortic valve:  Although there was no diagnostic evidence for vegetation,   this  study is not adequate to completely exclude the possibility.    -  Mitral valve: Although there was no diagnostic evidence for vegetation,   this  study is not adequate to completely exclude the possibility.    -  Tricuspid valve: Although there was no diagnostic evidence for vegetation,  this study is not adequate to completely exclude the possibility. SYSTEM MEASUREMENT TABLES    2D mode  AoR Diam (2D): 3.1 cm  LA Dimension (2D): 3.7 cm  Left Atrium Systolic Volume Index; Method of Disks, Biplane; 2D mode;: 33.3  ml/m2  IVS/LVPW (2D): 1.1  IVSd (2D): 1 cm  LVIDd (2D): 5.2 cm  LVIDs (2D): 3.2 cm  LVOT Area (2D): 2.8 cm2  LVPWd (2D): 1 cm  RVIDd (2D): 2.9 cm    Unspecified Scan Mode  Peak Grad; Mean; Antegrade Flow: 9 mm[Hg]  Vmax;  Antegrade Flow: 151 cm/s  LVOT Diam: 1.9 cm    Prepared and signed by    Dannie Navarro MD  Signed [id number] 14:05:41         Labs and Studies from previous 24 hours have been personally reviewed by myself    ASSESSMENT      Active Hospital Problems    Diagnosis Date Noted    MSSA bacteremia 11/03/2019    Intractable low back pain 11/03/2019    Sepsis due to methicillin susceptible Staphylococcus aureus ([facility] [address] 11/03/2019    Diabetes ([facility] [address] 11/03/2019    Chronic pain syndrome 09/24/2019     Hospital Problems as of 11/6/2019 Never Reviewed          Codes Class Noted - Resolved POA    MSSA bacteremia ICD-10-CM: R78.81  ICD-9-CM: 790.7, 041.11  11/3/2019 - Present Yes        Intractable low back pain ICD-10-CM: M54.5  ICD-9-CM: 724.2  11/3/2019 - Present Yes        * (Principal) Sepsis due to methicillin susceptible Staphylococcus aureus ([facility] [address] ICD-10-CM: A41.01  ICD-9-CM: 038.11, 995.91  11/3/2019 - Present Yes        Diabetes ([facility] [address] (Chronic) ICD-10-CM: E11.9  ICD-9-CM: 250.00  11/3/2019 - Present Yes        Chronic pain syndrome (Chronic) ICD-10-CM: G89.4  ICD-9-CM: 338.4  9/24/2019 - Present Yes        RESOLVED: Nausea & vomiting ICD-10-CM: R11.2  ICD-9-CM: 787.01  11/3/2019 - 11/3/2019 Yes              A/P:    -MSSA bacteremia  Echo with no vegetations  Repeat BC with MSSA  ID consulted ortho for possible stimulator removal  Cont IV cefazolin  Blood cultures to be repeated    -R hand swelling  ?cellulitis  Get x-ray R hand  ID consulted Hand Surgery  Already on abxs    -Chronic back pain  Stable  Cont pain management    -DM  Overall uncontrolled  Increase Lantus from 20 to 25 and cont  premeal coverage      DVT Prophylaxis: heparin  CODE Status: Full      Mike Chavez MD  11/06/19

## 2019-11-06 NOTE — PROGRESS NOTES
Spoke with doctor about pt complaints of pain unrelieved with medication per MAR. Notified pt hand does appear more red and swollen from picture taken earlier today. No orders received at this time. Will continue to monitor.

## 2019-11-06 NOTE — PROGRESS NOTES
Pt seen, examined. Consult to follow.  Plan for contrasted CT R hand to evaluate for abscess because patient can't have MRI

## 2019-11-07 ENCOUNTER — APPOINTMENT (OUTPATIENT)
Dept: CT IMAGING | Age: 59
DRG: 872 | End: 2019-11-07
Attending: ORTHOPAEDIC SURGERY
Payer: MEDICARE

## 2019-11-07 LAB
ANION GAP SERPL CALC-SCNC: 7 MMOL/L (ref 7–16)
BASOPHILS # BLD: 0 K/UL (ref 0–0.2)
BASOPHILS NFR BLD: 0 % (ref 0–2)
BUN SERPL-MCNC: 10 MG/DL (ref 8–23)
CALCIUM SERPL-MCNC: 9.2 MG/DL (ref 8.3–10.4)
CHLORIDE SERPL-SCNC: 99 MMOL/L (ref 98–107)
CO2 SERPL-SCNC: 32 MMOL/L (ref 21–32)
CREAT SERPL-MCNC: 0.38 MG/DL (ref 0.6–1)
DIFFERENTIAL METHOD BLD: ABNORMAL
EOSINOPHIL # BLD: 0.2 K/UL (ref 0–0.8)
EOSINOPHIL NFR BLD: 2 % (ref 0.5–7.8)
ERYTHROCYTE [DISTWIDTH] IN BLOOD BY AUTOMATED COUNT: 14.1 % (ref 11.9–14.6)
GLUCOSE BLD STRIP.AUTO-MCNC: 177 MG/DL (ref 65–100)
GLUCOSE BLD STRIP.AUTO-MCNC: 186 MG/DL (ref 65–100)
GLUCOSE BLD STRIP.AUTO-MCNC: 219 MG/DL (ref 65–100)
GLUCOSE BLD STRIP.AUTO-MCNC: 293 MG/DL (ref 65–100)
GLUCOSE SERPL-MCNC: 176 MG/DL (ref 65–100)
HCT VFR BLD AUTO: 40 % (ref 35.8–46.3)
HGB BLD-MCNC: 12.8 G/DL (ref 11.7–15.4)
IMM GRANULOCYTES # BLD AUTO: 0.1 K/UL (ref 0–0.5)
IMM GRANULOCYTES NFR BLD AUTO: 1 % (ref 0–5)
LYMPHOCYTES # BLD: 3 K/UL (ref 0.5–4.6)
LYMPHOCYTES NFR BLD: 32 % (ref 13–44)
MCH RBC QN AUTO: 28.4 PG (ref 26.1–32.9)
MCHC RBC AUTO-ENTMCNC: 32 G/DL (ref 31.4–35)
MCV RBC AUTO: 88.9 FL (ref 79.6–97.8)
MONOCYTES # BLD: 1 K/UL (ref 0.1–1.3)
MONOCYTES NFR BLD: 11 % (ref 4–12)
NEUTS SEG # BLD: 5 K/UL (ref 1.7–8.2)
NEUTS SEG NFR BLD: 54 % (ref 43–78)
NRBC # BLD: 0 K/UL (ref 0–0.2)
PLATELET # BLD AUTO: 235 K/UL (ref 150–450)
PMV BLD AUTO: 12.5 FL (ref 9.4–12.3)
POTASSIUM SERPL-SCNC: 3.1 MMOL/L (ref 3.5–5.1)
RBC # BLD AUTO: 4.5 M/UL (ref 4.05–5.2)
SODIUM SERPL-SCNC: 138 MMOL/L (ref 136–145)
WBC # BLD AUTO: 9.3 K/UL (ref 4.3–11.1)

## 2019-11-07 PROCEDURE — 73201 CT UPPER EXTREMITY W/DYE: CPT

## 2019-11-07 PROCEDURE — 74011636637 HC RX REV CODE- 636/637: Performed by: INTERNAL MEDICINE

## 2019-11-07 PROCEDURE — 82962 GLUCOSE BLOOD TEST: CPT

## 2019-11-07 PROCEDURE — 74011636637 HC RX REV CODE- 636/637: Performed by: FAMILY MEDICINE

## 2019-11-07 PROCEDURE — 85025 COMPLETE CBC W/AUTO DIFF WBC: CPT

## 2019-11-07 PROCEDURE — 74011250636 HC RX REV CODE- 250/636: Performed by: INTERNAL MEDICINE

## 2019-11-07 PROCEDURE — 74011250637 HC RX REV CODE- 250/637: Performed by: FAMILY MEDICINE

## 2019-11-07 PROCEDURE — 74011250637 HC RX REV CODE- 250/637: Performed by: INTERNAL MEDICINE

## 2019-11-07 PROCEDURE — 65270000029 HC RM PRIVATE

## 2019-11-07 PROCEDURE — 87040 BLOOD CULTURE FOR BACTERIA: CPT

## 2019-11-07 PROCEDURE — 74011636320 HC RX REV CODE- 636/320: Performed by: INTERNAL MEDICINE

## 2019-11-07 PROCEDURE — 74011000258 HC RX REV CODE- 258: Performed by: INTERNAL MEDICINE

## 2019-11-07 PROCEDURE — 74011250636 HC RX REV CODE- 250/636: Performed by: FAMILY MEDICINE

## 2019-11-07 PROCEDURE — 80048 BASIC METABOLIC PNL TOTAL CA: CPT

## 2019-11-07 PROCEDURE — 36415 COLL VENOUS BLD VENIPUNCTURE: CPT

## 2019-11-07 RX ORDER — POTASSIUM CHLORIDE 20 MEQ/1
40 TABLET, EXTENDED RELEASE ORAL 2 TIMES DAILY
Status: COMPLETED | OUTPATIENT
Start: 2019-11-07 | End: 2019-11-07

## 2019-11-07 RX ORDER — SODIUM CHLORIDE 0.9 % (FLUSH) 0.9 %
10 SYRINGE (ML) INJECTION
Status: COMPLETED | OUTPATIENT
Start: 2019-11-07 | End: 2019-11-07

## 2019-11-07 RX ADMIN — Medication 10 ML: at 11:15

## 2019-11-07 RX ADMIN — Medication 10 ML: at 09:27

## 2019-11-07 RX ADMIN — OXYCODONE HYDROCHLORIDE 10 MG: 5 TABLET ORAL at 14:24

## 2019-11-07 RX ADMIN — Medication 10 ML: at 21:11

## 2019-11-07 RX ADMIN — INSULIN LISPRO 5 UNITS: 100 INJECTION, SOLUTION INTRAVENOUS; SUBCUTANEOUS at 07:30

## 2019-11-07 RX ADMIN — INSULIN LISPRO 4 UNITS: 100 INJECTION, SOLUTION INTRAVENOUS; SUBCUTANEOUS at 21:12

## 2019-11-07 RX ADMIN — INSULIN LISPRO 2 UNITS: 100 INJECTION, SOLUTION INTRAVENOUS; SUBCUTANEOUS at 11:42

## 2019-11-07 RX ADMIN — LEVOTHYROXINE SODIUM 150 MCG: 100 TABLET ORAL at 04:29

## 2019-11-07 RX ADMIN — HYDROMORPHONE HYDROCHLORIDE 1 MG: 1 INJECTION, SOLUTION INTRAMUSCULAR; INTRAVENOUS; SUBCUTANEOUS at 12:52

## 2019-11-07 RX ADMIN — SODIUM CHLORIDE 100 ML: 900 INJECTION, SOLUTION INTRAVENOUS at 10:45

## 2019-11-07 RX ADMIN — Medication 10 ML: at 14:24

## 2019-11-07 RX ADMIN — INSULIN LISPRO 6 UNITS: 100 INJECTION, SOLUTION INTRAVENOUS; SUBCUTANEOUS at 16:27

## 2019-11-07 RX ADMIN — Medication 10 ML: at 06:05

## 2019-11-07 RX ADMIN — INSULIN LISPRO 5 UNITS: 100 INJECTION, SOLUTION INTRAVENOUS; SUBCUTANEOUS at 11:42

## 2019-11-07 RX ADMIN — OXYCODONE HYDROCHLORIDE 10 MG: 5 TABLET ORAL at 07:21

## 2019-11-07 RX ADMIN — FLUTICASONE PROPIONATE 2 SPRAY: 50 SPRAY, METERED NASAL at 08:58

## 2019-11-07 RX ADMIN — INSULIN LISPRO 5 UNITS: 100 INJECTION, SOLUTION INTRAVENOUS; SUBCUTANEOUS at 16:27

## 2019-11-07 RX ADMIN — CLONAZEPAM 0.5 MG: 0.5 TABLET ORAL at 11:07

## 2019-11-07 RX ADMIN — Medication 2 G: at 11:14

## 2019-11-07 RX ADMIN — POTASSIUM CHLORIDE 40 MEQ: 20 TABLET, EXTENDED RELEASE ORAL at 17:44

## 2019-11-07 RX ADMIN — POTASSIUM CHLORIDE 40 MEQ: 20 TABLET, EXTENDED RELEASE ORAL at 11:12

## 2019-11-07 RX ADMIN — ROSUVASTATIN CALCIUM 20 MG: 20 TABLET, FILM COATED ORAL at 21:11

## 2019-11-07 RX ADMIN — MORPHINE SULFATE 30 MG: 30 TABLET, FILM COATED, EXTENDED RELEASE ORAL at 02:28

## 2019-11-07 RX ADMIN — HYDROMORPHONE HYDROCHLORIDE 1 MG: 1 INJECTION, SOLUTION INTRAMUSCULAR; INTRAVENOUS; SUBCUTANEOUS at 04:29

## 2019-11-07 RX ADMIN — ONDANSETRON 4 MG: 2 INJECTION INTRAMUSCULAR; INTRAVENOUS at 02:35

## 2019-11-07 RX ADMIN — SENNOSIDES AND DOCUSATE SODIUM 2 TABLET: 8.6; 5 TABLET ORAL at 08:52

## 2019-11-07 RX ADMIN — POLYETHYLENE GLYCOL 3350 17 G: 17 POWDER, FOR SOLUTION ORAL at 08:52

## 2019-11-07 RX ADMIN — GABAPENTIN 400 MG: 400 CAPSULE ORAL at 21:10

## 2019-11-07 RX ADMIN — OXYCODONE HYDROCHLORIDE 10 MG: 5 TABLET ORAL at 21:10

## 2019-11-07 RX ADMIN — Medication 500 MG: at 08:52

## 2019-11-07 RX ADMIN — HEPARIN SODIUM 5000 UNITS: 5000 INJECTION INTRAVENOUS; SUBCUTANEOUS at 04:30

## 2019-11-07 RX ADMIN — INSULIN LISPRO 2 UNITS: 100 INJECTION, SOLUTION INTRAVENOUS; SUBCUTANEOUS at 07:30

## 2019-11-07 RX ADMIN — Medication 10 ML: at 18:04

## 2019-11-07 RX ADMIN — HYDROMORPHONE HYDROCHLORIDE 1 MG: 1 INJECTION, SOLUTION INTRAMUSCULAR; INTRAVENOUS; SUBCUTANEOUS at 18:03

## 2019-11-07 RX ADMIN — GABAPENTIN 400 MG: 400 CAPSULE ORAL at 08:52

## 2019-11-07 RX ADMIN — MORPHINE SULFATE 30 MG: 30 TABLET, FILM COATED, EXTENDED RELEASE ORAL at 16:26

## 2019-11-07 RX ADMIN — INSULIN GLARGINE 25 UNITS: 100 INJECTION, SOLUTION SUBCUTANEOUS at 21:12

## 2019-11-07 RX ADMIN — HEPARIN SODIUM 5000 UNITS: 5000 INJECTION INTRAVENOUS; SUBCUTANEOUS at 14:24

## 2019-11-07 RX ADMIN — HYDROMORPHONE HYDROCHLORIDE 1 MG: 1 INJECTION, SOLUTION INTRAMUSCULAR; INTRAVENOUS; SUBCUTANEOUS at 09:27

## 2019-11-07 RX ADMIN — Medication 2 G: at 21:11

## 2019-11-07 RX ADMIN — TRAZODONE HYDROCHLORIDE 100 MG: 50 TABLET ORAL at 21:11

## 2019-11-07 RX ADMIN — Medication 10 ML: at 10:45

## 2019-11-07 RX ADMIN — GABAPENTIN 400 MG: 400 CAPSULE ORAL at 16:27

## 2019-11-07 RX ADMIN — FLUOXETINE 20 MG: 20 CAPSULE ORAL at 08:52

## 2019-11-07 RX ADMIN — Medication 2 G: at 04:29

## 2019-11-07 RX ADMIN — IOPAMIDOL 100 ML: 755 INJECTION, SOLUTION INTRAVENOUS at 10:44

## 2019-11-07 RX ADMIN — HEPARIN SODIUM 5000 UNITS: 5000 INJECTION INTRAVENOUS; SUBCUTANEOUS at 21:11

## 2019-11-07 NOTE — PROGRESS NOTES
Patient O2 85 on RA, comes up with deep breathing. No SOB noted. Lung sounds coarse and wheezing, more on right lower. Placed on O2 @ 2 liters, MD and Respiratory notified. New orders placed.  Will continue to monitor

## 2019-11-07 NOTE — PROGRESS NOTES
Hourly rounds and needs met. PRN pain meds effective so far this shift. patient sleeping in bed at present. Care transferred to [facility].

## 2019-11-07 NOTE — PROGRESS NOTES
END OF SHIFT NOTE:    INTAKE/OUTPUT  No intake/output data recorded. Voiding: YES  Catheter: NO  Color: clear  Drain:              DIET  ADA    Flatus: Patient does have flatus present. Stool:  0 occurrences. Characteristics:       Ambulating  Yes, pt ambulating in room. Emesis: 0 occurrences. Characteristics:          VITAL SIGNS  Patient Vitals for the past 12 hrs:   Temp Pulse Resp BP SpO2   11/06/19 1928 98.9 °F (37.2 °C) 89 20 124/65 93 %   11/06/19 1709 96.8 °F (36 °C) 87 18 114/64 92 %   11/06/19 1143 98.2 °F (36.8 °C) 85 18 105/68 92 %   11/06/19 0738 98.7 °F (37.1 °C) 88 18 117/70 91 %       Pain Assessment  Pain Intensity 1: 7 (11/06/19 1823)  Pain Location 1: Back, Hand, Hip, Leg  Pain Intervention(s) 1: Medication (see MAR)  Patient Stated Pain Goal: 0      Hourly rounds completed this shift, will give report to oncoming nurse.       Atif Watters RN

## 2019-11-07 NOTE — PROGRESS NOTES
Interdisciplinary Rounds completed 11/07/19. Nursing, Case Management, Physician and PT present. Plan of care reviewed and updated. Waiting on negative blood cultures to place PICC line for IV antibiotics after discharge. ID following.

## 2019-11-07 NOTE — PROGRESS NOTES
Hourly rounds completed. All needs met. Pt c/o pain. Interventions per MAR. Pt ambulated around the room and part of the hallway during the shift. Gave report to the oncoming day shift nurse.

## 2019-11-07 NOTE — PROGRESS NOTES
Infectious Disease Note    Today's Date: 2019   Admit Date: 2019    Impression:   · MSSA bacteremia (, ). TTE NG but technically difficult  · Exacerbation of chronic back pain in the setting of lumbar hardware and spinal cord stimulator. Unable to perform MRI due to [facility]  · New R 5th  swelling and pain-concerns for tenosynovitis      Plan:   · Continue cefazolin 2g IV q8h. For now, will not pursue ALLI as duration will not change. May reassess if BCs persistently +  · Follow repeat BCs  · Duration: 6-8 week and then make a decision about extending past that. 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 Ideally, would like removal during abx course. We do not know for sure if SCC is infected but removal would help by allowing MRIs to locate infectious sites. · CT hand pending. Area actually better today. Melo Dalton, pt's daughter, is a [employer] RN. Cell number [phone]  Anti-infectives:   · Ceftriaxone (19 - 11/3/19)  · Vancomycin (11/3/19)  · Cefazolin (11/3 - )    Subjective:     Doing well, back pain still same but seems to be ambulating better today.  in room. I discussed ID plans with them. Denies nausea, vomiting, diarrhea, fever, chills, or sweats  Allergies   Allergen Reactions    Latex, Natural Rubber Contact Dermatitis    Silver Nitrate Applicators Rash        Review of Systems:  A comprehensive review of systems was negative except for that written in the History of Present Illness. Objective:     Visit Vitals  /55 (BP 1 Location: Left arm, BP Patient Position: Sitting)   Pulse 78   Temp 98.5 °F (36.9 °C)   Resp 18   Ht 5' 1\" (1.549 m)   Wt 66.2 kg (146 lb)   SpO2 95%   BMI 27.59 kg/m²     Temp (24hrs), Av.3 °F (36.8 °C), Min:96.8 °F (36 °C), Max:99 °F (37.2 °C)       Lines:  Peripheral IV:       Physical Exam:    General:  Alert, cooperative, appears stated age; in discomfort, but walking around in the room   Eyes:  Sclera anicteric.  Pupils equally round and reactive to light. Mouth/Throat: Mucous membranes normal, oral pharynx clear   Neck: Supple   Lungs:   Clear to auscultation bilaterally, good effort   CV:  Regular rate and rhythm,no murmur, click, rub or gallop   Abdomen:   Soft, non-tender. bowel sounds normal. non-distended   Extremities: No cyanosis or edema   Skin: Skin color, texture, turgor normal. no acute rash or lesions   Lymph nodes: Cervical and supraclavicular normal   Musculoskeletal: No swelling or deformity. Erythema and edema slightly better- R 5 MC. Skin indurated. Lines/Devices:  Intact, no erythema, drainage or tenderness   Psych:  Back:  Alert and oriented, normal mood affect given the setting  Spinal stimulator generator site and lumbar surgical site look benign               Data Review:     CBC:  Recent Labs     11/07/19 0640 11/06/19 0653 11/05/19 0705   WBC 9.3 12.1* 16.8*   GRANS 54 71 76   MONOS 11 10 9   EOS 2 1 1   ANEU 5.0 8.6* 12.7*   ABL 3.0 2.1 2.4   HGB 12.8 13.0 13.8   HCT 40.0 39.8 42.7    254 254       BMP:  Recent Labs     11/07/19 0640 11/06/19 0653   CREA 0.38* 0.44*   BUN 10 8    133*   K 3.1* 3.7   CL 99 94*   CO2 32 30   AGAP 7 9   * 253*       LFTS:  No results for input(s): TBILI, ALT, SGOT, AP, TP, ALB in the last 72 hours.     Microbiology:     All Micro Results     Procedure Component Value Units Date/Time    CULTURE, BLOOD [[id number]]  (Abnormal) Collected:  11/05/19 0705    Order Status:  Completed Specimen:  Blood Updated:  11/07/19 0739     Special Requests: --        RIGHT  HAND       GRAM STAIN GRAM POSITIVE COCCI         AEROBIC BOTTLE POSITIVE               RESULTS VERIFIED, PHONED TO AND READ BACK BY Inga Esqueda RN @ [id number] ON [id number] BY TVO           Culture result: STAPHYLOCOCCUS AUREUS         REFER TO BLOOD CULTURE IDENTIFICATION PANEL [id number]      SENSITIVITY TO FOLLOW       CULTURE, BLOOD [[id number]]  (Abnormal) Collected:  11/05/19 0713    Order Status:  Completed Specimen: Blood Updated:  11/07/19 0736     Special Requests: --        LEFT  HAND       GRAM STAIN       GRAM POS COCCI IN CLUSTERS            AEROBIC BOTTLE POSITIVE               CRITICAL RESULT NOT CALLED DUE TO PREVIOUS NOTIFICATION OF CRITICAL RESULT WITHIN THE LAST 24 HOURS. Culture result: STAPHYLOCOCCUS AUREUS         REFER TO [address] [place] [address] Drive [id number] FOR ID AND SUSCEPTIBILITY    CULTURE, BLOOD [[id number]] Collected:  11/07/19 0640    Order Status:  Completed Specimen:  Blood Updated:  11/07/19 0735    CULTURE, BLOOD [[id number]] Collected:  11/07/19 0646    Order Status:  Completed Specimen:  Blood Updated:  11/07/19 0735    CULTURE, URINE [[id number]]  (Abnormal)  (Susceptibility) Collected:  11/02/19 2003    Order Status:  Completed Specimen:  Cath Urine Updated:  11/06/19 0724     Special Requests: NO SPECIAL REQUESTS        Culture result:       >100,000 COLONIES/mL KLEBSIELLA PNEUMONIAE                  <1,000 CFU/ML MIXED SKIN JOEL ISOLATED          BLOOD CULTURE ID PANEL [[id number]]  (Abnormal) Collected:  11/05/19 0705    Order Status:  Completed Specimen:  Blood Updated:  11/06/19 0651     Acc. no. from Micro Order [id number]     Staphylococcus DETECTED        Staphylococcus aureus DETECTED        mecA (Methicillin-Resistance Genes) NOT DETECTED        INTERPRETATION       Gram positive cocci in clusters, identified in realtime PCR as probable MSSA. Clinical Consideration       Recommend discontinuing IV vancomycin starting cefazolin or nafcillin if patient not on beta-lactam therapy. Infectious Diseases Consult recommended in adult patients. THIS TEST DOES NOT REPLACE SENSITIVITY TESTING.           CULTURE, BLOOD [[id number]]  (Abnormal) Collected:  11/02/19 2123    Order Status:  Completed Specimen:  Blood Updated:  11/05/19 0731     Special Requests: --        RIGHT  HAND       GRAM STAIN GRAM POSITIVE COCCI         AEROBIC BOTTLE POSITIVE               CRITICAL RESULT NOT CALLED DUE TO PREVIOUS NOTIFICATION OF CRITICAL RESULT WITHIN THE LAST 24 HOURS. Culture result: STAPHYLOCOCCUS AUREUS               For Susceptibility Refer to Culture  [facility] [id number]      CULTURE, BLOOD [[id number]]  (Abnormal)  (Susceptibility) Collected:  11/02/19 2123    Order Status:  Completed Specimen:  Blood Updated:  11/05/19 0727     Special Requests: --        RIGHT  Antecubital       GRAM STAIN GRAM POSITIVE COCCI               AEROBIC AND ANAEROBIC BOTTLES                  RESULTS VERIFIED, PHONED TO AND READ BACK BY Michoacano Chambers RN AT 8922 11.3.19            Culture result: STAPHYLOCOCCUS AUREUS         REFER  [address] PANEL [facility] [id number]    BLOOD CULTURE ID PANEL [[id number]]  (Abnormal) Collected:  11/02/19 2123    Order Status:  Completed Specimen:  Blood Updated:  11/03/19 1318     Acc. no. from Micro Order [id number]     Staphylococcus DETECTED        Staphylococcus aureus DETECTED        mecA (Methicillin-Resistance Genes) NOT DETECTED        INTERPRETATION       Gram positive cocci in clusters, identified in realtime PCR as probable MSSA. Comment: RESULTS VERIFIED, PHONED TO AND READ BACK BY  Michoacano Chambers RN AT 7660 11.3.19           Clinical Consideration       Recommend discontinuing IV vancomycin starting cefazolin or nafcillin if patient not on beta-lactam therapy. Infectious Diseases Consult recommended in adult patients. THIS TEST DOES NOT REPLACE SENSITIVITY TESTING. Imaging:   CT abd/pelvis (11/3/19)  IMPRESSION: No acute finding abdomen and pelvis    CXR (11/2/19)  IMPRESSION: No acute process    CT spine (11/1/19)  IMPRESSION:  1. Postoperative changes noted L3-L5. 2. Moderate bilateral neural foraminal narrowing present at L4-L5 and L5-S1  3. Mild bilateral neural foraminal narrowing noted at L3-L4.     CT myelogram (11/1/19)   Contrast readily flows throughout the lumbar region      Signed By: Adasrh Delacruz NP     November 7, 2019

## 2019-11-07 NOTE — CONSULTS
[address]    Name:  Kathleen Gavin  MR#:  [id number]  :  1960  ACCOUNT #:  [de-identified]  DATE OF SERVICE:  2019      ORTHOPEDIC CONSULTATION    REASON FOR CONSULTATION:  Right hand pain. HISTORY OF PRESENT ILLNESS:  I was asked by the hospitalist service to see this 55-year-old female for several days of increasing pain in the right hand. She denies any history of trauma. She has a chronic pain syndrome for which she takes MS Contin 15 mg and Norco as needed. She was admitted with low back and left hip pain, as well as fatigue, fevers and nausea and vomiting. She had fevers up to 103 at home for several days. She was recently seen by Interventional Radiology for a myelogram.  She has been anxious and tearful by report due to pain. She presented with elevated white blood cells and CRP, as well as 4+ bacteria in her urine. The patient has a spinal cord stimulator, so she cannot have an MRI. She complains of right hand pain that she has had for a few days and has gotten more painful. She denies any injury to the hand. She has Factor-V deficiency and diabetes. She is a current everyday smoker and she drinks alcohol. PAST MEDICAL HISTORY:  Reviewed and noncontributory except as noted above. PAST SURGICAL HISTORY:  Reviewed and noncontributory except as noted above. MEDICATIONS:  Reviewed and noncontributory except as noted above. ALLERGIES:  REVIEWED AND NONCONTRIBUTORY EXCEPT AS NOTED ABOVE. FAMILY HISTORY:  Reviewed and noncontributory except as noted above. SOCIAL HISTORY:  Reviewed and noncontributory except as noted above. REVIEW OF SYSTEMS:  Reviewed and noncontributory except as noted above. PHYSICAL EXAMINATION:  GENERAL:  Demonstrates a chronically ill-appearing overweight female in no acute distress. She is seated on the bed.   MUSCULOSKELETAL:  There is a Coban wrap around her right wrist that was placed immediately prior to my examination. There is some dorsal soft tissue swelling and erythema of the ulnar side of the right hand. Is focally tender. There are no definitive overlying skin changes other than the erythema. I do not appreciate any adenopathy. Radiographs of the hand are pending at the time of this dictation. Gram stain from blood cultures was noted to grow gram-positive cocci. IMPRESSION:  Right hand pain and swelling in a patient with apparent sepsis versus bacteremia. This is worrisome for the possibility of a hematogenous abscess. We will proceed with a contrasted right hand CT to evaluate for abscess since the patient is not a candidate for an MRI. If she has an abscess she may require irrigation and debridement, but I am not clinically convinced enough to proceed that now.         MD KAT Regalado/S_JO [id number]  D:  11/06/2019 15:54  T:  11/06/2019 20:58  JOB #:  [id number]

## 2019-11-07 NOTE — PROGRESS NOTES
Hospitalist Progress Note    2019  Admit Date: 2019  7:34 PM   NAME: Brian Solorio   :  1960   DOS:              19  MRN:  [id number]   Attending: Maggie Sommers MD  PCP:  Cyril Boeck., MD  Treatment Team: Attending Provider: Roberto Carlos Foote MD; Utilization Review: Hobej Damian; Consulting Provider: João Gonsalez MD; Care Manager: Dylan Harris [facility]; Consulting Provider: Sonya Duron MD; Primary Nurse: Layla Arora RN    Full Code     SUBJECTIVE:   As previously documented: 61yo F wit hx anxiety/depression, DM, factor 5, PE, and chronic low back pain who presented with acute worsening of back pain. Has 20y hx back pain and follows pain mgmt and neurosurgery. Has stimulator implanted in back.  At a baseline, is functional for ADLs, takes MS contin 15 and prn norco.  c/o 5 days of severe worsening of low back pain radiating into L hip/glute area. fevers of up to 103 for several days, breaks with sweating, then feels better for a few days. Patient unable to have MRI due to spinal simulator. 19    Brian Solorio  Reported tenderness and swelling of R hand improved. 10+ ROS reviewed and negative except for positive in HPI.    Allergies   Allergen Reactions    Latex, Natural Rubber Contact Dermatitis    Silver Nitrate Applicators Rash     Current Facility-Administered Medications   Medication Dose Route Frequency    potassium chloride (K-DUR, KLOR-CON) SR tablet 40 mEq  40 mEq Oral BID    insulin glargine (LANTUS) injection 25 Units  25 Units SubCUTAneous QHS    nicotine (NICODERM CQ) 21 mg/24 hr patch 1 Patch  1 Patch TransDERmal Q24H    Saccharomyces boulardii (FLORASTOR) capsule 500 mg  500 mg Oral DAILY    insulin lispro (HUMALOG) injection 5 Units  5 Units SubCUTAneous TIDAC    polyethylene glycol (MIRALAX) packet 17 g  17 g Oral DAILY PRN    senna-docusate (PERICOLACE) 8.6-50 mg per tablet 2 Tab  2 Tab Oral DAILY    clonazePAM (KlonoPIN) tablet 0.5 mg  0.5 mg Oral Q6H PRN    FLUoxetine (PROzac) capsule 20 mg  20 mg Oral DAILY    fluticasone propionate (FLONASE) 50 mcg/actuation nasal spray 2 Spray  2 Spray Both Nostrils DAILY    albuterol (PROVENTIL VENTOLIN) nebulizer solution 2.5 mg  2.5 mg Nebulization Q4H PRN    gabapentin (NEURONTIN) capsule 400 mg  400 mg Oral TID    levothyroxine (SYNTHROID) tablet 150 mcg  150 mcg Oral 6am    [Held by provider] lisinopril (PRINIVIL, ZESTRIL) tablet 10 mg  10 mg Oral DAILY    HYDROmorphone (PF) (DILAUDID) injection 1 mg  1 mg IntraVENous Q4H PRN    rosuvastatin (CRESTOR) tablet 20 mg  20 mg Oral QHS    traZODone (DESYREL) tablet 100 mg  100 mg Oral QHS    insulin lispro (HUMALOG) injection   SubCUTAneous AC&HS    sodium chloride (NS) flush 5-40 mL  5-40 mL IntraVENous Q8H    sodium chloride (NS) flush 5-40 mL  5-40 mL IntraVENous PRN    oxyCODONE IR (ROXICODONE) tablet 10 mg  10 mg Oral Q4H PRN    diphenhydrAMINE (BENADRYL) capsule 25 mg  25 mg Oral Q4H PRN    ondansetron (ZOFRAN) injection 4 mg  4 mg IntraVENous Q4H PRN    bisacodyl (DULCOLAX) tablet 5 mg  5 mg Oral DAILY PRN    heparin (porcine) injection 5,000 Units  5,000 Units SubCUTAneous Q8H    morphine CR (MS CONTIN) tablet 30 mg  30 mg Oral Q12H    ceFAZolin (ANCEF) 2 g/20 mL in sterile water IV syringe  2 g IntraVENous Q8H         Immunization History   Administered Date(s) Administered    TB Skin Test (PPD) Intradermal 2019     Objective:     Patient Vitals for the past 24 hrs:   Temp Pulse Resp BP SpO2   19 0753 98.5 °F (36.9 °C) 78 18 112/55 95 %   19 0402 99 °F (37.2 °C) 85 20 152/74 94 %   19 0036 98.4 °F (36.9 °C) 81 20 145/87 93 %   19 1950     96 %   19 1928 98.9 °F (37.2 °C) 89 20 124/65 93 %   19 1709 96.8 °F (36 °C) 87 18 114/64 92 %   19 1143 98.2 °F (36.8 °C) 85 18 105/68 92 %     Temp (24hrs), Av.3 °F (36.8 °C), Min:96.8 °F (36 °C), Max:99 °F (37.2 °C)    Oxygen Therapy  O2 Sat (%): 95 % (11/07/19 0753)  Pulse via Oximetry: 80 beats per minute (11/06/19 1950)  O2 Device: Nasal cannula (11/06/19 1950)  O2 Flow Rate (L/min): 2 l/min (11/06/19 1950)  Oxygen Therapy  O2 Sat (%): 95 % (11/07/19 0753)  Pulse via Oximetry: 80 beats per minute (11/06/19 1950)  O2 Device: Nasal cannula (11/06/19 1950)  O2 Flow Rate (L/min): 2 l/min (11/06/19 1950)    Physical Exam:  General:         Alert, cooperative, no distress   Lungs: No wheezing/rhonchi/rales  Cardiovascular:   RRR. No m/r/g. No pedal edema b/l. Abdomen:       S/nt/nd. Bowel sounds normal. .   Neurologic:   No gross focal deficit. Extremities:         R hand with tenderness and erythema mostly of medial aspect of the hand. DIAGNOSTIC STUDIES      Data Review:   Recent Results (from the past 24 hour(s))   GLUCOSE, POC    Collection Time: 11/06/19  4:09 PM   Result Value Ref Range    Glucose (POC) 211 (H) 65 - 100 mg/dL   GLUCOSE, POC    Collection Time: 11/06/19  9:38 PM   Result Value Ref Range    Glucose (POC) 273 (H) 65 - 100 mg/dL   CBC WITH AUTOMATED DIFF    Collection Time: 11/07/19  6:40 AM   Result Value Ref Range    WBC 9.3 4.3 - 11.1 K/uL    RBC 4.50 4.05 - 5.2 M/uL    HGB 12.8 11.7 - 15.4 g/dL    HCT 40.0 35.8 - 46.3 %    MCV 88.9 79.6 - 97.8 FL    MCH 28.4 26.1 - 32.9 PG    MCHC 32.0 31.4 - 35.0 g/dL    RDW 14.1 11.9 - 14.6 %    PLATELET 222 832 - 667 K/uL    MPV 12.5 (H) 9.4 - 12.3 FL    ABSOLUTE NRBC 0.00 0.0 - 0.2 K/uL    DF AUTOMATED      NEUTROPHILS 54 43 - 78 %    LYMPHOCYTES 32 13 - 44 %    MONOCYTES 11 4.0 - 12.0 %    EOSINOPHILS 2 0.5 - 7.8 %    BASOPHILS 0 0.0 - 2.0 %    IMMATURE GRANULOCYTES 1 0.0 - 5.0 %    ABS. NEUTROPHILS 5.0 1.7 - 8.2 K/UL    ABS. LYMPHOCYTES 3.0 0.5 - 4.6 K/UL    ABS. MONOCYTES 1.0 0.1 - 1.3 K/UL    ABS. EOSINOPHILS 0.2 0.0 - 0.8 K/UL    ABS. BASOPHILS 0.0 0.0 - 0.2 K/UL    ABS. IMM.  GRANS. 0.1 0.0 - 0.5 K/UL METABOLIC PANEL, BASIC    Collection Time: 11/07/19  6:40 AM   Result Value Ref Range    Sodium 138 136 - 145 mmol/L    Potassium 3.1 (L) 3.5 - 5.1 mmol/L    Chloride 99 98 - 107 mmol/L    CO2 32 21 - 32 mmol/L    Anion gap 7 7 - 16 mmol/L    Glucose 176 (H) 65 - 100 mg/dL    BUN 10 8 - 23 MG/DL    Creatinine 0.38 (L) 0.6 - 1.0 MG/DL    GFR est AA >60 >60 ml/min/1.73m2    GFR est non-AA >60 >60 ml/min/1.73m2    Calcium 9.2 8.3 - 10.4 MG/DL   GLUCOSE, POC    Collection Time: 11/07/19  7:37 AM   Result Value Ref Range    Glucose (POC) 177 (H) 65 - 100 mg/dL       All Micro Results     Procedure Component Value Units Date/Time    CULTURE, BLOOD [[id number]]  (Abnormal) Collected:  11/05/19 0705    Order Status:  Completed Specimen:  Blood Updated:  11/07/19 0739     Special Requests: --        RIGHT  HAND       GRAM STAIN GRAM POSITIVE COCCI         AEROBIC BOTTLE POSITIVE               RESULTS VERIFIED, PHONED TO AND READ BACK BY DEISY Goodman RN @ 1030 ON [id number] BY TVO           Culture result: STAPHYLOCOCCUS AUREUS         REFER TO BLOOD CULTURE IDENTIFICATION PANEL [id number]      SENSITIVITY TO FOLLOW       CULTURE, BLOOD [[id number]]  (Abnormal) Collected:  11/05/19 0713    Order Status:  Completed Specimen:  Blood Updated:  11/07/19 0736     Special Requests: --        LEFT  HAND       GRAM STAIN       GRAM POS COCCI IN CLUSTERS            AEROBIC BOTTLE POSITIVE               CRITICAL RESULT NOT CALLED DUE TO PREVIOUS NOTIFICATION OF CRITICAL RESULT WITHIN THE LAST 24 HOURS.            Culture result: STAPHYLOCOCCUS AUREUS         REFER TO [address] [place] [address] Drive [id number] FOR ID AND SUSCEPTIBILITY    CULTURE, BLOOD [[id number]] Collected:  11/07/19 0640    Order Status:  Completed Specimen:  Blood Updated:  11/07/19 0735    CULTURE, BLOOD [[id number]] Collected:  11/07/19 0646    Order Status:  Completed Specimen:  Blood Updated:  11/07/19 0735    CULTURE, URINE [[id number]]  (Abnormal)  (Susceptibility) Collected:  11/02/19 2003 Order Status:  Completed Specimen:  Cath Urine Updated:  11/06/19 0724     Special Requests: NO SPECIAL REQUESTS        Culture result:       >100,000 COLONIES/mL KLEBSIELLA PNEUMONIAE                  <1,000 CFU/ML MIXED SKIN JOEL ISOLATED          BLOOD CULTURE ID PANEL [[id number]]  (Abnormal) Collected:  11/05/19 0705    Order Status:  Completed Specimen:  Blood Updated:  11/06/19 0651     Acc. no. from Micro Order [id number]     Staphylococcus DETECTED        Staphylococcus aureus DETECTED        mecA (Methicillin-Resistance Genes) NOT DETECTED        INTERPRETATION       Gram positive cocci in clusters, identified in realtime PCR as probable MSSA. Clinical Consideration       Recommend discontinuing IV vancomycin starting cefazolin or nafcillin if patient not on beta-lactam therapy. Infectious Diseases Consult recommended in adult patients. THIS TEST DOES NOT REPLACE SENSITIVITY TESTING. CULTURE, BLOOD [[id number]]  (Abnormal) Collected:  11/02/19 2123    Order Status:  Completed Specimen:  Blood Updated:  11/05/19 0731     Special Requests: --        RIGHT  HAND       GRAM STAIN GRAM POSITIVE COCCI         AEROBIC BOTTLE POSITIVE               CRITICAL RESULT NOT CALLED DUE TO PREVIOUS NOTIFICATION OF CRITICAL RESULT WITHIN THE LAST 24 HOURS.            Culture result: STAPHYLOCOCCUS AUREUS               For Susceptibility Refer to Culture  [facility] AK.[id number]      CULTURE, BLOOD [[id number]]  (Abnormal)  (Susceptibility) Collected:  11/02/19 2123    Order Status:  Completed Specimen:  Blood Updated:  11/05/19 0729     Special Requests: --        RIGHT  Antecubital       GRAM STAIN GRAM POSITIVE COCCI               AEROBIC AND ANAEROBIC BOTTLES                  RESULTS VERIFIED, PHONED TO AND READ BACK BY Екатерина Borden RN AT 0943 11.3.19 CW           Culture result: STAPHYLOCOCCUS AUREUS         REFER TO Gwendolyn WARD [facility] [id number]    BLOOD CULTURE ID PANEL [[id number]]  (Abnormal) Collected: 11/02/19 2123    Order Status:  Completed Specimen:  Blood Updated:  11/03/19 1318     Acc. no. from Micro Order [id number]     Staphylococcus DETECTED        Staphylococcus aureus DETECTED        mecA (Methicillin-Resistance Genes) NOT DETECTED        INTERPRETATION       Gram positive cocci in clusters, identified in realtime PCR as probable MSSA. Comment: RESULTS VERIFIED, PHONED TO AND READ BACK BY  Jocelyn Shen RN AT 4218 11.3.19           Clinical Consideration       Recommend discontinuing IV vancomycin starting cefazolin or nafcillin if patient not on beta-lactam therapy. Infectious Diseases Consult recommended in adult patients. THIS TEST DOES NOT REPLACE SENSITIVITY TESTING. Imaging /Procedures /Studies:    CXR Results  (Last 48 hours)               11/06/19 2020  XR CHEST PA LAT Final result    Impression:  Impression:   No significant interval change. Narrative:  PA and lateral chest radiographs       History: decreased O2 SAT, Chest congestion, 59 years Female       Comparison: Chest radiograph 11-2-19       Findings:  Normal cardiomediastinal silhouette. Minimal dependent subsegmental   atelectasis bilateral lung bases. No evidence of pneumothorax, pleural   effusion, or air space opacity. Thoracic spinal stimulator device appears to   remain in relative anatomic position. Evidence of cholecystectomy. Visualized   soft tissue and osseous structures otherwise unremarkable. CT Results  (Last 48 hours)    None        Xr Chest Pa Lat    Result Date: 11/6/2019  Impression:   No significant interval change. Xr Hand Rt Min 3 V    Result Date: 11/6/2019  IMPRESSION: 1. Soft tissue swelling without soft tissue gas or acute osseous abnormality.      Results for orders placed or performed during the hospital encounter of 11/02/19   2D ECHO COMPLETE ADULT (TTE) W  [place] [address] [facility], [address] [place] [address]  [phone]    Transthoracic Echocardiogram  2D, M-mode, Doppler, and Color Doppler    Patient: Hollie Saldivar  MR #: [id number]  : 15-Leonardo-1960  Age: 62 years  Gender: Female  Study date: 2019  Account #: [de-identified]  Height: 61 in  Weight: 145.6 lb  BSA: 1.65 mï¾²  Status:Routine  Location: [facility]  BP: 108/ 62    Allergies: LATEX, NATURAL RUBBER, SILVER NITRATE APPLICATORS    Sonographer:  Jaime Schwab, RCS  Group:  [facility]  Referring Physician:  Hakan Britton. Cee Ballard MD  Reading Physician:  Graham Toro MD    INDICATIONS: Staph Bacteremia  *Technically difficult study. Patient scanned sitting inclined due to severe  left hip and leg pain when laying on left side or supine. PROCEDURE: This was a routine study. A transthoracic echocardiogram was  performed. The study included complete 2D imaging, M-mode, complete spectral  Doppler, and color Doppler. Intravenous contrast (Definity) was administered. Echocardiographic views were limited by restricted patient mobility and poor  patient compliance. This was a technically difficult study. LEFT VENTRICLE: Size was normal. Systolic function was normal. Ejection  fraction was estimated in the range of 60 % to 65 %. There were no regional  wall motion abnormalities. Wall thickness was normal. Left ventricular  diastolic function parameters were normal. Avg E/e': 10.35.    RIGHT VENTRICLE: The size was normal. Systolic function was normal. The  tricuspid jet envelope definition was inadequate for estimation of RV   systolic  pressure. LEFT ATRIUM: Size was normal.    RIGHT ATRIUM: Size was normal.    SYSTEMIC VEINS: IVC: The inferior vena cava was normal in size and course. The  respirophasic change in diameter was more than 50%. AORTIC VALVE: The valve was trileaflet. Leaflets exhibited mild sclerosis.   Although there was no diagnostic evidence for vegetation, this study is not  adequate to completely exclude the possibility. There was no evidence for  stenosis. There was no insufficiency. MITRAL VALVE: Valve structure was normal. Although there was no diagnostic  evidence for vegetation, this study is not adequate to completely exclude the  possibility. There was no evidence for stenosis. There was no regurgitation. TRICUSPID VALVE: Not well visualized. Although there was no diagnostic   evidence  for vegetation, this study is not adequate to completely exclude the  possibility. There was no evidence for stenosis. There was no regurgitation. PULMONIC VALVE: Although there was no diagnostic evidence for vegetation,   this  study is not adequate to completely exclude the possibility. Not well  visualized. There was no evidence for stenosis. There was no insufficiency. PERICARDIUM: There was no pericardial effusion. AORTA: The root exhibited normal size. SUMMARY:    -  Left ventricle: Systolic function was normal. Ejection fraction was  estimated in the range of 60 % to 65 %. There were no regional wall motion  abnormalities. -  Aortic valve: Although there was no diagnostic evidence for vegetation,   this  study is not adequate to completely exclude the possibility.    -  Mitral valve: Although there was no diagnostic evidence for vegetation,   this  study is not adequate to completely exclude the possibility.    -  Tricuspid valve: Although there was no diagnostic evidence for vegetation,  this study is not adequate to completely exclude the possibility. SYSTEM MEASUREMENT TABLES    2D mode  AoR Diam (2D): 3.1 cm  LA Dimension (2D): 3.7 cm  Left Atrium Systolic Volume Index; Method of Disks, Biplane; 2D mode;: 33.3  ml/m2  IVS/LVPW (2D): 1.1  IVSd (2D): 1 cm  LVIDd (2D): 5.2 cm  LVIDs (2D): 3.2 cm  LVOT Area (2D): 2.8 cm2  LVPWd (2D): 1 cm  RVIDd (2D): 2.9 cm    Unspecified Scan Mode  Peak Grad; Mean; Antegrade Flow: 9 mm[Hg]  Vmax;  Antegrade Flow: 151 cm/s  LVOT Diam: 1.9 cm    Prepared and signed by    Ashley Benson MD  Signed [id number] 14:05:41         Labs and Studies from previous 24 hours have been personally reviewed by myself    ASSESSMENT      Active Hospital Problems    Diagnosis Date Noted    MSSA bacteremia 11/03/2019    Intractable low back pain 11/03/2019    Sepsis due to methicillin susceptible Staphylococcus aureus ([facility] 11/03/2019    Diabetes ([facility] 11/03/2019    Chronic pain syndrome 09/24/2019     Hospital Problems as of 11/7/2019 Never Reviewed          Codes Class Noted - Resolved POA    MSSA bacteremia ICD-10-CM: R78.81  ICD-9-CM: 790.7, 041.11  11/3/2019 - Present Yes        Intractable low back pain ICD-10-CM: M54.5  ICD-9-CM: 724.2  11/3/2019 - Present Yes        * (Principal) Sepsis due to methicillin susceptible Staphylococcus aureus ([facility] ICD-10-CM: A41.01  ICD-9-CM: 038.11, 995.91  11/3/2019 - Present Yes        Diabetes ([facility] (Chronic) ICD-10-CM: E11.9  ICD-9-CM: 250.00  11/3/2019 - Present Yes        Chronic pain syndrome (Chronic) ICD-10-CM: G89.4  ICD-9-CM: 338.4  9/24/2019 - Present Yes        RESOLVED: Nausea & vomiting ICD-10-CM: R11.2  ICD-9-CM: 787.01  11/3/2019 - 11/3/2019 Yes              A/P:    -MSSA bacteremia  WBC trended down to normal ranges   BC with MSSA, repeated BC pending  ID - abxs duration 6-8 weeks  Cont IV cefazolin  PICC line when \"ok\" by ID    -R hand swelling  ?cellulitis  Improving    x-ray R hand with no acute findings  Ortho evaluation appreciated  CT R hand pending  Cont abxs    -Chronic back pain  Stable  Cont pain management    -DM  Better controlled today  Cont Lantus 25 units and  premeal coverage      DVT Prophylaxis: heparin  CODE Status: Full      Maykel Campbell MD  11/07/19

## 2019-11-07 NOTE — PROGRESS NOTES
Orthopedic Progress Note    2019  Admit Date: 2019  Admit Diagnosis: Intractable pain [R52]  Intractable low back pain [M54.5]    Post Op day: * No surgery found *    Subjective:     Neris Edith     Less R hand pain today. CT pending       Objective:     Vital Signs:    Temp (24hrs), Av.3 °F (36.8 °C), Min:96.8 °F (36 °C), Max:99 °F (37.2 °C)      LAB:    [unfilled]  No results found for: INR, INREXT  Lab Results   Component Value Date/Time    HGB 12.8 2019 06:40 AM    HGB 13.0 2019 06:53 AM       Physical Exam:    R hand dorsal soft tissue swelling and erythema decreased from yesterday    Plan: Will review CT. Clinically improved.  Continue IV ABX         Signed By: Paul Raphael MD

## 2019-11-08 LAB
ANION GAP SERPL CALC-SCNC: 6 MMOL/L (ref 7–16)
BACTERIA SPEC CULT: ABNORMAL
BASOPHILS # BLD: 0 K/UL (ref 0–0.2)
BASOPHILS NFR BLD: 0 % (ref 0–2)
BUN SERPL-MCNC: 9 MG/DL (ref 8–23)
CALCIUM SERPL-MCNC: 7.7 MG/DL (ref 8.3–10.4)
CHLORIDE SERPL-SCNC: 97 MMOL/L (ref 98–107)
CO2 SERPL-SCNC: 34 MMOL/L (ref 21–32)
CREAT SERPL-MCNC: 0.4 MG/DL (ref 0.6–1)
DIFFERENTIAL METHOD BLD: NORMAL
EOSINOPHIL # BLD: 0.2 K/UL (ref 0–0.8)
EOSINOPHIL NFR BLD: 2 % (ref 0.5–7.8)
ERYTHROCYTE [DISTWIDTH] IN BLOOD BY AUTOMATED COUNT: 13.9 % (ref 11.9–14.6)
GLUCOSE BLD STRIP.AUTO-MCNC: 207 MG/DL (ref 65–100)
GLUCOSE BLD STRIP.AUTO-MCNC: 214 MG/DL (ref 65–100)
GLUCOSE BLD STRIP.AUTO-MCNC: 222 MG/DL (ref 65–100)
GLUCOSE BLD STRIP.AUTO-MCNC: 231 MG/DL (ref 65–100)
GLUCOSE SERPL-MCNC: 196 MG/DL (ref 65–100)
GRAM STN SPEC: ABNORMAL
HCT VFR BLD AUTO: 39.3 % (ref 35.8–46.3)
HGB BLD-MCNC: 12.5 G/DL (ref 11.7–15.4)
IMM GRANULOCYTES # BLD AUTO: 0.1 K/UL (ref 0–0.5)
IMM GRANULOCYTES NFR BLD AUTO: 1 % (ref 0–5)
LYMPHOCYTES # BLD: 2.6 K/UL (ref 0.5–4.6)
LYMPHOCYTES NFR BLD: 29 % (ref 13–44)
MCH RBC QN AUTO: 28.4 PG (ref 26.1–32.9)
MCHC RBC AUTO-ENTMCNC: 31.8 G/DL (ref 31.4–35)
MCV RBC AUTO: 89.3 FL (ref 79.6–97.8)
MONOCYTES # BLD: 0.9 K/UL (ref 0.1–1.3)
MONOCYTES NFR BLD: 10 % (ref 4–12)
NEUTS SEG # BLD: 5.1 K/UL (ref 1.7–8.2)
NEUTS SEG NFR BLD: 58 % (ref 43–78)
NRBC # BLD: 0 K/UL (ref 0–0.2)
PLATELET # BLD AUTO: 229 K/UL (ref 150–450)
PMV BLD AUTO: 11.9 FL (ref 9.4–12.3)
POTASSIUM SERPL-SCNC: 3.8 MMOL/L (ref 3.5–5.1)
RBC # BLD AUTO: 4.4 M/UL (ref 4.05–5.2)
SERVICE CMNT-IMP: ABNORMAL
SERVICE CMNT-IMP: ABNORMAL
SODIUM SERPL-SCNC: 137 MMOL/L (ref 136–145)
WBC # BLD AUTO: 8.9 K/UL (ref 4.3–11.1)

## 2019-11-08 PROCEDURE — 74011636637 HC RX REV CODE- 636/637: Performed by: INTERNAL MEDICINE

## 2019-11-08 PROCEDURE — 74011250637 HC RX REV CODE- 250/637: Performed by: INTERNAL MEDICINE

## 2019-11-08 PROCEDURE — 85025 COMPLETE CBC W/AUTO DIFF WBC: CPT

## 2019-11-08 PROCEDURE — 74011250636 HC RX REV CODE- 250/636: Performed by: FAMILY MEDICINE

## 2019-11-08 PROCEDURE — 65270000029 HC RM PRIVATE

## 2019-11-08 PROCEDURE — 74011250636 HC RX REV CODE- 250/636: Performed by: INTERNAL MEDICINE

## 2019-11-08 PROCEDURE — 82962 GLUCOSE BLOOD TEST: CPT

## 2019-11-08 PROCEDURE — 74011250637 HC RX REV CODE- 250/637: Performed by: FAMILY MEDICINE

## 2019-11-08 PROCEDURE — 36415 COLL VENOUS BLD VENIPUNCTURE: CPT

## 2019-11-08 PROCEDURE — 80048 BASIC METABOLIC PNL TOTAL CA: CPT

## 2019-11-08 PROCEDURE — 74011636637 HC RX REV CODE- 636/637: Performed by: FAMILY MEDICINE

## 2019-11-08 RX ORDER — INSULIN LISPRO 100 [IU]/ML
7 INJECTION, SOLUTION INTRAVENOUS; SUBCUTANEOUS
Status: DISCONTINUED | OUTPATIENT
Start: 2019-11-08 | End: 2019-11-09

## 2019-11-08 RX ORDER — HYDROMORPHONE HYDROCHLORIDE 1 MG/ML
1 INJECTION, SOLUTION INTRAMUSCULAR; INTRAVENOUS; SUBCUTANEOUS ONCE
Status: ACTIVE | OUTPATIENT
Start: 2019-11-08 | End: 2019-11-08

## 2019-11-08 RX ORDER — POTASSIUM CHLORIDE 20 MEQ/1
40 TABLET, EXTENDED RELEASE ORAL 2 TIMES DAILY
Status: COMPLETED | OUTPATIENT
Start: 2019-11-08 | End: 2019-11-08

## 2019-11-08 RX ADMIN — Medication 10 ML: at 21:22

## 2019-11-08 RX ADMIN — INSULIN LISPRO 4 UNITS: 100 INJECTION, SOLUTION INTRAVENOUS; SUBCUTANEOUS at 21:12

## 2019-11-08 RX ADMIN — LEVOTHYROXINE SODIUM 150 MCG: 100 TABLET ORAL at 05:17

## 2019-11-08 RX ADMIN — MORPHINE SULFATE 30 MG: 30 TABLET, FILM COATED, EXTENDED RELEASE ORAL at 04:34

## 2019-11-08 RX ADMIN — OXYCODONE HYDROCHLORIDE 10 MG: 5 TABLET ORAL at 10:36

## 2019-11-08 RX ADMIN — BISACODYL 5 MG: 5 TABLET, COATED ORAL at 08:02

## 2019-11-08 RX ADMIN — FLUOXETINE 20 MG: 20 CAPSULE ORAL at 08:02

## 2019-11-08 RX ADMIN — INSULIN LISPRO 7 UNITS: 100 INJECTION, SOLUTION INTRAVENOUS; SUBCUTANEOUS at 12:09

## 2019-11-08 RX ADMIN — GABAPENTIN 400 MG: 400 CAPSULE ORAL at 08:02

## 2019-11-08 RX ADMIN — INSULIN LISPRO 5 UNITS: 100 INJECTION, SOLUTION INTRAVENOUS; SUBCUTANEOUS at 08:30

## 2019-11-08 RX ADMIN — HEPARIN SODIUM 5000 UNITS: 5000 INJECTION INTRAVENOUS; SUBCUTANEOUS at 05:18

## 2019-11-08 RX ADMIN — TRAZODONE HYDROCHLORIDE 100 MG: 50 TABLET ORAL at 21:12

## 2019-11-08 RX ADMIN — HEPARIN SODIUM 5000 UNITS: 5000 INJECTION INTRAVENOUS; SUBCUTANEOUS at 21:12

## 2019-11-08 RX ADMIN — POTASSIUM CHLORIDE 40 MEQ: 20 TABLET, EXTENDED RELEASE ORAL at 08:00

## 2019-11-08 RX ADMIN — Medication 500 MG: at 08:00

## 2019-11-08 RX ADMIN — GABAPENTIN 400 MG: 400 CAPSULE ORAL at 17:24

## 2019-11-08 RX ADMIN — Medication 2 G: at 05:17

## 2019-11-08 RX ADMIN — ONDANSETRON 4 MG: 2 INJECTION INTRAMUSCULAR; INTRAVENOUS at 03:47

## 2019-11-08 RX ADMIN — INSULIN LISPRO 4 UNITS: 100 INJECTION, SOLUTION INTRAVENOUS; SUBCUTANEOUS at 08:01

## 2019-11-08 RX ADMIN — INSULIN LISPRO 4 UNITS: 100 INJECTION, SOLUTION INTRAVENOUS; SUBCUTANEOUS at 12:08

## 2019-11-08 RX ADMIN — HEPARIN SODIUM 5000 UNITS: 5000 INJECTION INTRAVENOUS; SUBCUTANEOUS at 14:50

## 2019-11-08 RX ADMIN — POLYETHYLENE GLYCOL 3350 17 G: 17 POWDER, FOR SOLUTION ORAL at 19:35

## 2019-11-08 RX ADMIN — POTASSIUM CHLORIDE 40 MEQ: 20 TABLET, EXTENDED RELEASE ORAL at 17:20

## 2019-11-08 RX ADMIN — HYDROMORPHONE HYDROCHLORIDE 1 MG: 1 INJECTION, SOLUTION INTRAMUSCULAR; INTRAVENOUS; SUBCUTANEOUS at 12:10

## 2019-11-08 RX ADMIN — HYDROMORPHONE HYDROCHLORIDE 1 MG: 1 INJECTION, SOLUTION INTRAMUSCULAR; INTRAVENOUS; SUBCUTANEOUS at 19:35

## 2019-11-08 RX ADMIN — Medication 10 ML: at 05:17

## 2019-11-08 RX ADMIN — INSULIN GLARGINE 25 UNITS: 100 INJECTION, SOLUTION SUBCUTANEOUS at 21:12

## 2019-11-08 RX ADMIN — ROSUVASTATIN CALCIUM 20 MG: 20 TABLET, FILM COATED ORAL at 21:12

## 2019-11-08 RX ADMIN — OXYCODONE HYDROCHLORIDE 10 MG: 5 TABLET ORAL at 17:20

## 2019-11-08 RX ADMIN — MORPHINE SULFATE 30 MG: 30 TABLET, FILM COATED, EXTENDED RELEASE ORAL at 14:50

## 2019-11-08 RX ADMIN — GABAPENTIN 400 MG: 400 CAPSULE ORAL at 21:12

## 2019-11-08 RX ADMIN — Medication 10 ML: at 14:00

## 2019-11-08 RX ADMIN — INSULIN LISPRO 7 UNITS: 100 INJECTION, SOLUTION INTRAVENOUS; SUBCUTANEOUS at 17:19

## 2019-11-08 RX ADMIN — CLONAZEPAM 0.5 MG: 0.5 TABLET ORAL at 21:50

## 2019-11-08 RX ADMIN — POLYETHYLENE GLYCOL 3350 17 G: 17 POWDER, FOR SOLUTION ORAL at 08:02

## 2019-11-08 RX ADMIN — Medication 2 G: at 12:09

## 2019-11-08 RX ADMIN — INSULIN LISPRO 4 UNITS: 100 INJECTION, SOLUTION INTRAVENOUS; SUBCUTANEOUS at 17:19

## 2019-11-08 RX ADMIN — HYDROMORPHONE HYDROCHLORIDE 1 MG: 1 INJECTION, SOLUTION INTRAMUSCULAR; INTRAVENOUS; SUBCUTANEOUS at 08:02

## 2019-11-08 RX ADMIN — SENNOSIDES AND DOCUSATE SODIUM 2 TABLET: 8.6; 5 TABLET ORAL at 08:01

## 2019-11-08 RX ADMIN — Medication 2 G: at 21:12

## 2019-11-08 RX ADMIN — HYDROMORPHONE HYDROCHLORIDE 1 MG: 1 INJECTION, SOLUTION INTRAMUSCULAR; INTRAVENOUS; SUBCUTANEOUS at 03:36

## 2019-11-08 RX ADMIN — FLUTICASONE PROPIONATE 2 SPRAY: 50 SPRAY, METERED NASAL at 09:00

## 2019-11-08 NOTE — PROGRESS NOTES
Nutrition  Reason for assessment: Length of Stay day 5 (early)    Assessment:   Food/Nutrition Patient History:  Patient with history of DM, PE, chronic pain admitted to 1 month of fatigue, fevers, nausea, and vomiting. She was seen today with her daughter and granddaughter at bedside. She states that she was eating very little prior to admission due to nausea and vomiting. She was only able to eat small amounts of bland foods such as bananas or crackers. She states that nausea and vomiting have improved and her appetite is \"too good. \" She states that she has been eating well. She is asking for information to take home to help her with her DM diet and to eat better in general. She states she has been referred to DM management classes, but has never had time to go. DIET DIABETIC CONSISTENT CARB Regular      Anthropometrics:Height: 5' 1\" (154.9 cm),  Weight: 66.2 kg (146 lb),  , Body mass index is 27.59 kg/m². BMI class of overweight     Macronutrient needs: 66.2 kg Listed body weight  EER:  7117-4091 kcal /day (20-25 kcal/kg)  EPR:  53-66 grams protein/day (0.8-1 grams/kg)    Intake/Comparative Standards: Recorded meal(s): 100% of 9 recorded meals. This potentially meets ~100% of kcal and ~100% of protein needs    Nutrition Diagnosis:   Food and nutrition related knowledge deficit related to consistent CHO diet as evidenced by patient requesting information. .     Intervention:  Meals and snacks: Continue current diet   Nutrition education: Provided patient and family with written and verbal instruction on consistent CHO and general healthy diet. Handouts provided: Consistent CHO, Healthy Ways to [employer] Your Diet, Daily Food Choices. Patient verbalizes  understanding of diet. Expect fair compliance. Discharge Plan: Recommended outpatient DM management classes for additional information and resources.     [facility] [address], [address], [address]

## 2019-11-08 NOTE — PROGRESS NOTES
Hourly rounds performed. All needs met. Bed is in low position and call light is within reach. Pt complained of lower back pain and L. Hip pain and nausea. Pt received PRN pain med and nausea medicine. Will continue to monitor and report to oncoming nurse.

## 2019-11-08 NOTE — PROGRESS NOTES
END OF SHIFT NOTE:    INTAKE/OUTPUT  11/06 0701 - 11/07 0700  In: 720 [P.O.:720]  Out: -   Voiding: YES  Catheter: NO  Color: clear  Drain:              DIET  ADA    Flatus: Patient does have flatus present. Stool:  0 occurrences. Characteristics:       Ambulating  Yes    Emesis: 0 occurrences. Characteristics:          VITAL SIGNS  Patient Vitals for the past 12 hrs:   Temp Pulse Resp BP SpO2   11/07/19 1710 99.5 °F (37.5 °C) 81 18 95/61 95 %   11/07/19 1500   18     11/07/19 1217 98.2 °F (36.8 °C) 81 18 125/72 98 %   11/07/19 1145   18     11/07/19 0753 98.5 °F (36.9 °C) 78 18 112/55 95 %       Pain Assessment  Pain Intensity 1: 5 (11/07/19 1840)  Pain Location 1: Back, Hand, Hip, Leg  Pain Intervention(s) 1: Medication (see MAR)  Patient Stated Pain Goal: 0      Hourly rounds completed this shift, Report given to oncoming nurse, Jesús Mccormack RN.       Lamine Foley RN

## 2019-11-08 NOTE — PROGRESS NOTES
Hospitalist Progress Note    2019  Admit Date: 2019  7:34 PM   NAME: Jose Pace   :  1960   DOS:              19  MRN:  [id number]   Attending: Dmitri Galvan MD  PCP:  Soledad Padron MD  Treatment Team: Attending Provider: Grupo Harper MD; Utilization Review: Wale Aranda; Consulting Provider: Dariel Rojo MD; Care Manager: Bessie Dougherty [facility] [place]; Consulting Provider: Sweta Dykes MD    Full Code     SUBJECTIVE:   As previously documented: 61yo F wit hx anxiety/depression, DM, factor 5, PE, and chronic low back pain who presented with acute worsening of back pain. Has 20y hx back pain and follows pain mgmt and neurosurgery. Has stimulator implanted in back.  At a baseline, is functional for ADLs, takes MS contin 15 and prn norco.  c/o 5 days of severe worsening of low back pain radiating into L hip/glute area. fevers of up to 103 for several days, breaks with sweating, then feels better for a few days. Patient unable to have MRI due to spinal simulator. 19    Jose Pace  Reported tenderness and swelling of R hand continues to improve. 10+ ROS reviewed and negative except for positive in HPI.    Allergies   Allergen Reactions    Latex, Natural Rubber Contact Dermatitis    Silver Nitrate Applicators Rash     Current Facility-Administered Medications   Medication Dose Route Frequency    potassium chloride (K-DUR, KLOR-CON) SR tablet 40 mEq  40 mEq Oral BID    HYDROmorphone (PF) (DILAUDID) injection 1 mg  1 mg IntraVENous ONCE    insulin glargine (LANTUS) injection 25 Units  25 Units SubCUTAneous QHS    nicotine (NICODERM CQ) 21 mg/24 hr patch 1 Patch  1 Patch TransDERmal Q24H    Saccharomyces boulardii (FLORASTOR) capsule 500 mg  500 mg Oral DAILY    insulin lispro (HUMALOG) injection 5 Units  5 Units SubCUTAneous TIDAC    polyethylene glycol (MIRALAX) packet 17 g  17 g Oral DAILY PRN    senna-docusate (PERICOLACE) 8.6-50 mg per tablet 2 Tab  2 Tab Oral DAILY    clonazePAM (KlonoPIN) tablet 0.5 mg  0.5 mg Oral Q6H PRN    FLUoxetine (PROzac) capsule 20 mg  20 mg Oral DAILY    fluticasone propionate (FLONASE) 50 mcg/actuation nasal spray 2 Spray  2 Spray Both Nostrils DAILY    albuterol (PROVENTIL VENTOLIN) nebulizer solution 2.5 mg  2.5 mg Nebulization Q4H PRN    gabapentin (NEURONTIN) capsule 400 mg  400 mg Oral TID    levothyroxine (SYNTHROID) tablet 150 mcg  150 mcg Oral 6am    [Held by provider] lisinopril (PRINIVIL, ZESTRIL) tablet 10 mg  10 mg Oral DAILY    HYDROmorphone (PF) (DILAUDID) injection 1 mg  1 mg IntraVENous Q4H PRN    rosuvastatin (CRESTOR) tablet 20 mg  20 mg Oral QHS    traZODone (DESYREL) tablet 100 mg  100 mg Oral QHS    insulin lispro (HUMALOG) injection   SubCUTAneous AC&HS    sodium chloride (NS) flush 5-40 mL  5-40 mL IntraVENous Q8H    sodium chloride (NS) flush 5-40 mL  5-40 mL IntraVENous PRN    oxyCODONE IR (ROXICODONE) tablet 10 mg  10 mg Oral Q4H PRN    diphenhydrAMINE (BENADRYL) capsule 25 mg  25 mg Oral Q4H PRN    ondansetron (ZOFRAN) injection 4 mg  4 mg IntraVENous Q4H PRN    bisacodyl (DULCOLAX) tablet 5 mg  5 mg Oral DAILY PRN    heparin (porcine) injection 5,000 Units  5,000 Units SubCUTAneous Q8H    morphine CR (MS CONTIN) tablet 30 mg  30 mg Oral Q12H    ceFAZolin (ANCEF) 2 g/20 mL in sterile water IV syringe  2 g IntraVENous Q8H         Immunization History   Administered Date(s) Administered    TB Skin Test (PPD) Intradermal 11/03/2019     Objective:     Patient Vitals for the past 24 hrs:   Temp Pulse Resp BP SpO2   11/08/19 0722 98.4 °F (36.9 °C) 75 16 139/64 95 %   11/08/19 0423 98.1 °F (36.7 °C) 82 18 119/67 91 %   11/07/19 2300 98.1 °F (36.7 °C) 83 18 120/69 92 %   11/07/19 1930 98.1 °F (36.7 °C) 85 18 122/71 91 %   11/07/19 1710 99.5 °F (37.5 °C) 81 18 95/61 95 %   11/07/19 1500   18     11/07/19 1217 98.2 °F (36.8 °C) 81 18 125/72 98 % 19 1145   18       Temp (24hrs), Av.4 °F (36.9 °C), Min:98.1 °F (36.7 °C), Max:99.5 °F (37.5 °C)    Oxygen Therapy  O2 Sat (%): 95 % (19)  Pulse via Oximetry: 80 beats per minute (19)  O2 Device: Nasal cannula (19)  O2 Flow Rate (L/min): 2 l/min (19)  Oxygen Therapy  O2 Sat (%): 95 % (19)  Pulse via Oximetry: 80 beats per minute (19)  O2 Device: Nasal cannula (19)  O2 Flow Rate (L/min): 2 l/min (19)    Physical Exam:  General:         Alert, cooperative, no distress   Lungs: No wheezing/rhonchi/rales  Cardiovascular:   RRR. No m/r/g. No pedal edema b/l. Abdomen:       S/nt/nd. Bowel sounds normal. .   Neurologic:   No gross focal deficit. Extremities:         R hand with minimal tenderness, no erythema               DIAGNOSTIC STUDIES      Data Review:   Recent Results (from the past 24 hour(s))   GLUCOSE, POC    Collection Time: 19 11:34 AM   Result Value Ref Range    Glucose (POC) 186 (H) 65 - 100 mg/dL   GLUCOSE, POC    Collection Time: 19  4:14 PM   Result Value Ref Range    Glucose (POC) 293 (H) 65 - 100 mg/dL   GLUCOSE, POC    Collection Time: 19  8:42 PM   Result Value Ref Range    Glucose (POC) 219 (H) 65 - 100 mg/dL   CBC WITH AUTOMATED DIFF    Collection Time: 19  6:17 AM   Result Value Ref Range    WBC 8.9 4.3 - 11.1 K/uL    RBC 4.40 4.05 - 5.2 M/uL    HGB 12.5 11.7 - 15.4 g/dL    HCT 39.3 35.8 - 46.3 %    MCV 89.3 79.6 - 97.8 FL    MCH 28.4 26.1 - 32.9 PG    MCHC 31.8 31.4 - 35.0 g/dL    RDW 13.9 11.9 - 14.6 %    PLATELET 946 986 - 985 K/uL    MPV 11.9 9.4 - 12.3 FL    ABSOLUTE NRBC 0.00 0.0 - 0.2 K/uL    DF AUTOMATED      NEUTROPHILS 58 43 - 78 %    LYMPHOCYTES 29 13 - 44 %    MONOCYTES 10 4.0 - 12.0 %    EOSINOPHILS 2 0.5 - 7.8 %    BASOPHILS 0 0.0 - 2.0 %    IMMATURE GRANULOCYTES 1 0.0 - 5.0 %    ABS. NEUTROPHILS 5.1 1.7 - 8.2 K/UL    ABS.  LYMPHOCYTES 2.6 0.5 - 4.6 K/UL    ABS. MONOCYTES 0.9 0.1 - 1.3 K/UL    ABS. EOSINOPHILS 0.2 0.0 - 0.8 K/UL    ABS. BASOPHILS 0.0 0.0 - 0.2 K/UL    ABS. IMM. GRANS. 0.1 0.0 - 0.5 K/UL   METABOLIC PANEL, BASIC    Collection Time: 11/08/19  6:17 AM   Result Value Ref Range    Sodium 137 136 - 145 mmol/L    Potassium 3.8 3.5 - 5.1 mmol/L    Chloride 97 (L) 98 - 107 mmol/L    CO2 34 (H) 21 - 32 mmol/L    Anion gap 6 (L) 7 - 16 mmol/L    Glucose 196 (H) 65 - 100 mg/dL    BUN 9 8 - 23 MG/DL    Creatinine 0.40 (L) 0.6 - 1.0 MG/DL    GFR est AA >60 >60 ml/min/1.73m2    GFR est non-AA >60 >60 ml/min/1.73m2    Calcium 7.7 (L) 8.3 - 10.4 MG/DL   GLUCOSE, POC    Collection Time: 11/08/19  7:19 AM   Result Value Ref Range    Glucose (POC) 222 (H) 65 - 100 mg/dL       All Micro Results     Procedure Component Value Units Date/Time    CULTURE, BLOOD [[id number]] Collected:  11/07/19 0640    Order Status:  Completed Specimen:  Blood Updated:  11/08/19 0810     Special Requests: --        LEFT  HAND       Culture result: NO GROWTH 1 DAY       CULTURE, BLOOD [[id number]] Collected:  11/07/19 0646    Order Status:  Completed Specimen:  Blood Updated:  11/08/19 0810     Special Requests: --        LEFT  FOREARM       Culture result: NO GROWTH 1 DAY       CULTURE, BLOOD [[id number]]  (Abnormal) Collected:  11/05/19 0713    Order Status:  Completed Specimen:  Blood Updated:  11/08/19 0721     Special Requests: --        LEFT  HAND       GRAM STAIN       GRAM POS COCCI IN CLUSTERS            AEROBIC BOTTLE POSITIVE               CRITICAL RESULT NOT CALLED DUE TO PREVIOUS NOTIFICATION OF CRITICAL RESULT WITHIN THE LAST 24 HOURS.            Culture result: STAPHYLOCOCCUS AUREUS               For Susceptibility Refer to Culture  [facility] [id number]      CULTURE, BLOOD [[id number]]  (Abnormal)  (Susceptibility) Collected:  11/05/19 0705    Order Status:  Completed Specimen:  Blood Updated:  11/08/19 0719     Special Requests: --        RIGHT  HAND       GRAM STAIN Juan Diego Otero POSITIVE COCCI         AEROBIC BOTTLE POSITIVE               RESULTS VERIFIED, PHONED TO AND READ BACK BY Yonis Lopez RN @ 1913 ON [id number] BY TVO           Culture result: STAPHYLOCOCCUS AUREUS         REFER TO BLOOD CULTURE IDENTIFICATION PANEL [id number]    CULTURE, URINE [[id number]]  (Abnormal)  (Susceptibility) Collected:  11/02/19 2003    Order Status:  Completed Specimen:  Cath Urine Updated:  11/06/19 0724     Special Requests: NO SPECIAL REQUESTS        Culture result:       >100,000 COLONIES/mL KLEBSIELLA PNEUMONIAE                  <1,000 CFU/ML MIXED SKIN JOEL ISOLATED          BLOOD CULTURE ID PANEL [[id number]]  (Abnormal) Collected:  11/05/19 0705    Order Status:  Completed Specimen:  Blood Updated:  11/06/19 0651     Acc. no. from Micro Order [id number]     Staphylococcus DETECTED        Staphylococcus aureus DETECTED        mecA (Methicillin-Resistance Genes) NOT DETECTED        INTERPRETATION       Gram positive cocci in clusters, identified in realtime PCR as probable MSSA. Clinical Consideration       Recommend discontinuing IV vancomycin starting cefazolin or nafcillin if patient not on beta-lactam therapy. Infectious Diseases Consult recommended in adult patients. THIS TEST DOES NOT REPLACE SENSITIVITY TESTING. CULTURE, BLOOD [[id number]]  (Abnormal) Collected:  11/02/19 2123    Order Status:  Completed Specimen:  Blood Updated:  11/05/19 0731     Special Requests: --        RIGHT  HAND       GRAM STAIN GRAM POSITIVE COCCI         AEROBIC BOTTLE POSITIVE               CRITICAL RESULT NOT CALLED DUE TO PREVIOUS NOTIFICATION OF CRITICAL RESULT WITHIN THE LAST 24 HOURS.            Culture result: STAPHYLOCOCCUS AUREUS               For Susceptibility Refer to Culture  [facility] [id number]      CULTURE, BLOOD [[id number]]  (Abnormal)  (Susceptibility) Collected:  11/02/19 2123    Order Status:  Completed Specimen:  Blood Updated:  11/05/19 0729     Special Requests: -- RIGHT  Antecubital       GRAM STAIN GRAM POSITIVE COCCI               AEROBIC AND ANAEROBIC BOTTLES                  RESULTS VERIFIED, PHONED TO AND READ BACK BY Jocelyn Shen RN AT 2204 11.3.19 CW           Culture result: STAPHYLOCOCCUS AUREUS         REFER TO Gwendolyn Felix Ware PANEL [address] [id number]    BLOOD CULTURE ID PANEL [[id number]]  (Abnormal) Collected:  11/02/19 2123    Order Status:  Completed Specimen:  Blood Updated:  11/03/19 1318     Acc. no. from Micro Order [id number]     Staphylococcus DETECTED        Staphylococcus aureus DETECTED        mecA (Methicillin-Resistance Genes) NOT DETECTED        INTERPRETATION       Gram positive cocci in clusters, identified in realtime PCR as probable MSSA. Comment: RESULTS VERIFIED, PHONED TO AND READ BACK BY  Jocelyn Shen RN AT 5066 11.3.19 CW          Clinical Consideration       Recommend discontinuing IV vancomycin starting cefazolin or nafcillin if patient not on beta-lactam therapy. Infectious Diseases Consult recommended in adult patients. THIS TEST DOES NOT REPLACE SENSITIVITY TESTING. Imaging /Procedures /Studies:    CXR Results  (Last 48 hours)               11/06/19 2020  XR CHEST PA LAT Final result    Impression:  Impression:   No significant interval change. Narrative:  PA and lateral chest radiographs       History: decreased O2 SAT, Chest congestion, 59 years Female       Comparison: Chest radiograph 11-2-19       Findings:  Normal cardiomediastinal silhouette. Minimal dependent subsegmental   atelectasis bilateral lung bases. No evidence of pneumothorax, pleural   effusion, or air space opacity. Thoracic spinal stimulator device appears to   remain in relative anatomic position. Evidence of cholecystectomy. Visualized   soft tissue and osseous structures otherwise unremarkable.                  CT Results  (Last 48 hours)               11/07/19 1052  CT UP EXT RT W CONT Final result    Impression:  IMPRESSION:    No obvious acute abnormality. Narrative:  Clinical History: The patient is a 62years year old Female presenting with   symptoms of Joint pain, hand       Technique: Following intravenous contrast, thin section CT images through the right hand   were obtained. To optimally assess the anatomic relationships of the bones to   one another, as well as to optimally assess the individual osseous structures,   multiplanar reformatted images were also available for review. All CT scans at this facility are performed using dose reduction/dose modulation   techniques, as appropriate the performed exam, including the following:    Automated Exposure Control; Adjustment of the mA and/or kV according to patient   size (this includes techniques or standardized protocols for targeted exams   where dose is matched to indication/reason for exam); and Use of Iterative   Reconstruction Technique. 100 mL of Isovue-370 was administered intravenously during the exam       Radiation Exposure Indices:   Reference Air Kerma (Cj Labrum) = 86 mGy-cm           Comparison:  None. FINDINGS:    Multiplanar imaging of the right hand demonstrates no evidence of significant   soft tissue swelling. Normal soft tissue planes are maintained. There is no   evidence of abnormal enhancement. There is normal bone alignment, joint spaces   are well-maintained. No definite osseous erosion or bone destruction is seen   although 3 phase bone scan would be more sensitive for possible osteomyelitis if   clinically indicated. Ct Up Ext Rt W Cont    Result Date: 11/7/2019  IMPRESSION: No obvious acute abnormality.      Results for orders placed or performed during the hospital encounter of 11/02/19   2D ECHO COMPLETE ADULT (TTE) W OR [address] [facility]  [address] [facility], [address] [facility]  [phone]    Transthoracic Echocardiogram  2D, M-mode, Doppler, and Color Doppler    Patient: Mariola Covarrubias  MR #: [id number]  : 15-Leonardo-1960  Age: 62 years  Gender: Female  Study date: 2019  Account #: [de-identified]  Height: 61 in  Weight: 145.6 lb  BSA: 1.65 mï¾²  Status:Routine  Location: [facility]  BP: 108/ 62    Allergies: LATEX, NATURAL RUBBER, SILVER NITRATE APPLICATORS    Sonographer:  CARINA Main  Group:  [facility]  Referring Physician:  Claudia Giordano. Mann Sinclair MD  Reading Physician:  Maria Martinez MD    INDICATIONS: Staph Bacteremia  *Technically difficult study. Patient scanned sitting inclined due to severe  left hip and leg pain when laying on left side or supine. PROCEDURE: This was a routine study. A transthoracic echocardiogram was  performed. The study included complete 2D imaging, M-mode, complete spectral  Doppler, and color Doppler. Intravenous contrast (Definity) was administered. Echocardiographic views were limited by restricted patient mobility and poor  patient compliance. This was a technically difficult study. LEFT VENTRICLE: Size was normal. Systolic function was normal. Ejection  fraction was estimated in the range of 60 % to 65 %. There were no regional  wall motion abnormalities. Wall thickness was normal. Left ventricular  diastolic function parameters were normal. Avg E/e': 10.35.    RIGHT VENTRICLE: The size was normal. Systolic function was normal. The  tricuspid jet envelope definition was inadequate for estimation of RV   systolic  pressure. LEFT ATRIUM: Size was normal.    RIGHT ATRIUM: Size was normal.    SYSTEMIC VEINS: IVC: The inferior vena cava was normal in size and course. The  respirophasic change in diameter was more than 50%. AORTIC VALVE: The valve was trileaflet. Leaflets exhibited mild sclerosis. Although there was no diagnostic evidence for vegetation, this study is not  adequate to completely exclude the possibility. There was no evidence for  stenosis.  There was no insufficiency. MITRAL VALVE: Valve structure was normal. Although there was no diagnostic  evidence for vegetation, this study is not adequate to completely exclude the  possibility. There was no evidence for stenosis. There was no regurgitation. TRICUSPID VALVE: Not well visualized. Although there was no diagnostic   evidence  for vegetation, this study is not adequate to completely exclude the  possibility. There was no evidence for stenosis. There was no regurgitation. PULMONIC VALVE: Although there was no diagnostic evidence for vegetation,   this  study is not adequate to completely exclude the possibility. Not well  visualized. There was no evidence for stenosis. There was no insufficiency. PERICARDIUM: There was no pericardial effusion. AORTA: The root exhibited normal size. SUMMARY:    -  Left ventricle: Systolic function was normal. Ejection fraction was  estimated in the range of 60 % to 65 %. There were no regional wall motion  abnormalities. -  Aortic valve: Although there was no diagnostic evidence for vegetation,   this  study is not adequate to completely exclude the possibility.    -  Mitral valve: Although there was no diagnostic evidence for vegetation,   this  study is not adequate to completely exclude the possibility.    -  Tricuspid valve: Although there was no diagnostic evidence for vegetation,  this study is not adequate to completely exclude the possibility. SYSTEM MEASUREMENT TABLES    2D mode  AoR Diam (2D): 3.1 cm  LA Dimension (2D): 3.7 cm  Left Atrium Systolic Volume Index; Method of Disks, Biplane; 2D mode;: 33.3  ml/m2  IVS/LVPW (2D): 1.1  IVSd (2D): 1 cm  LVIDd (2D): 5.2 cm  LVIDs (2D): 3.2 cm  LVOT Area (2D): 2.8 cm2  LVPWd (2D): 1 cm  RVIDd (2D): 2.9 cm    Unspecified Scan Mode  Peak Grad; Mean; Antegrade Flow: 9 mm[Hg]  Vmax;  Antegrade Flow: 151 cm/s  LVOT Diam: 1.9 cm    Prepared and signed by    Cristela Craig MD  Signed [id number] 14:05:41 Labs and Studies from previous 24 hours have been personally reviewed by myself    ASSESSMENT      Active Hospital Problems    Diagnosis Date Noted    MSSA bacteremia 11/03/2019    Intractable low back pain 11/03/2019    Sepsis due to methicillin susceptible Staphylococcus aureus ([facility] 11/03/2019    Diabetes ([facility] 11/03/2019    Chronic pain syndrome 09/24/2019     Hospital Problems as of 11/8/2019 Never Reviewed          Codes Class Noted - Resolved POA    MSSA bacteremia ICD-10-CM: R78.81  ICD-9-CM: 790.7, 041.11  11/3/2019 - Present Yes        Intractable low back pain ICD-10-CM: M54.5  ICD-9-CM: 724.2  11/3/2019 - Present Yes        * (Principal) Sepsis due to methicillin susceptible Staphylococcus aureus ([facility] ICD-10-CM: A41.01  ICD-9-CM: 038.11, 995.91  11/3/2019 - Present Yes        Diabetes ([facility] (Chronic) ICD-10-CM: E11.9  ICD-9-CM: 250.00  11/3/2019 - Present Yes        Chronic pain syndrome (Chronic) ICD-10-CM: G89.4  ICD-9-CM: 338.4  9/24/2019 - Present Yes        RESOLVED: Nausea & vomiting ICD-10-CM: R11.2  ICD-9-CM: 787.01  11/3/2019 - 11/3/2019 Yes              A/P:    -MSSA bacteremia   BC with MSSA, repeated BC pending  ID - abxs duration 6-8 weeks- outpatient abxs plan when cultures negative  Cont IV cefazolin      -R hand swelling  Improving   CT R hand with no acute findings  Ortho evaluation appreciated  Cont abxs    -Chronic back pain  Reported her back pain is acting up after doing some transfers when she was going for CT  Cont pain management    -DM  Overall controlled  Cont Lantus 25 units and  Increase pre meal coverage to 7 units      DVT Prophylaxis: heparin  CODE Status: Full      Meseret Angel MD  11/08/19

## 2019-11-08 NOTE — PROGRESS NOTES
Infectious Disease Note    Today's Date: 2019   Admit Date: 2019    Impression:   · MSSA bacteremia (, ). TTE NG but technically difficult  · Exacerbation of chronic back pain in the setting of lumbar hardware and spinal cord stimulator. Unable to perform MRI due to [facility]  · New R 5th  swelling and pain-concerns for tenosynovitis; improving and CT unremarkable    Plan:   · Continue cefazolin 2g IV q8h. For now, will not pursue ALLI as duration will not . · Follow repeat BCs collected 19  · Duration: 6-8 week and then make a decision about extending past that. 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 Ideally, would like removal during abx course. We do not know for sure if SCC is infected but removal would help by allowing MRIs to locate infectious sites. · I anticipate that she will be here through the weekend and we will make outpatient plans for discharge on Monday. Bridget Márquez, pt's daughter, is a [employer] RN. Cell number [phone]  Anti-infectives:   · Ceftriaxone (19 - 11/3/19)  · Vancomycin (11/3/19)  · Cefazolin (11/3 - )    Subjective:   She is still having back pain with paresthesia radiating to her left foot. I discussed ID plans with them. Denies nausea, vomiting, diarrhea, fever, chills, or sweats. Allergies   Allergen Reactions    Latex, Natural Rubber Contact Dermatitis    Silver Nitrate Applicators Rash        Review of Systems:  A comprehensive review of systems was negative except for that written in the History of Present Illness.     Objective:     Visit Vitals  /64 (BP 1 Location: Right arm, BP Patient Position: Sitting)   Pulse 75   Temp 98.4 °F (36.9 °C)   Resp 16   Ht 5' 1\" (1.549 m)   Wt 66.2 kg (146 lb)   SpO2 95%   BMI 27.59 kg/m²     Temp (24hrs), Av.4 °F (36.9 °C), Min:98.1 °F (36.7 °C), Max:99.5 °F (37.5 °C)       Lines:  Peripheral IV:       Physical Exam:    General:  Alert, cooperative, appears stated age; in discomfort, but walking around in the room   Eyes:  Sclera anicteric. Pupils equally round and reactive to light. Mouth/Throat: Mucous membranes normal, oral pharynx clear   Neck: Supple   Lungs:   Clear to auscultation bilaterally, good effort   CV:  Regular rate and rhythm, no murmur, click, rub or gallop   Abdomen:   Soft, non-tender. bowel sounds normal. non-distended   Extremities: No cyanosis or edema   Skin: Skin color, texture, turgor normal. no acute rash or lesions   Lymph nodes: Cervical and supraclavicular normal   Musculoskeletal: No swelling or deformity. Right hand swelling, pain, and erythema have pretty much resolved. Lines/Devices:  Intact, no erythema, drainage or tenderness   Psych:  Back:  Alert and oriented, normal mood affect given the setting  Spinal stimulator generator site and lumbar surgical site look benign             Data Review:     CBC:  Recent Labs     11/08/19 0617 11/07/19 0640 11/06/19 0653   WBC 8.9 9.3 12.1*   GRANS 58 54 71   MONOS 10 11 10   EOS 2 2 1   ANEU 5.1 5.0 8.6*   ABL 2.6 3.0 2.1   HGB 12.5 12.8 13.0   HCT 39.3 40.0 39.8    235 254       BMP:  Recent Labs     11/08/19 0617 11/07/19 0640 11/06/19 0653   CREA 0.40* 0.38* 0.44*   BUN 9 10 8    138 133*   K 3.8 3.1* 3.7   CL 97* 99 94*   CO2 34* 32 30   AGAP 6* 7 9   * 176* 253*       LFTS:  No results for input(s): TBILI, ALT, SGOT, AP, TP, ALB in the last 72 hours.     Microbiology:     All Micro Results     Procedure Component Value Units Date/Time    CULTURE, BLOOD [[id number]] Collected:  11/07/19 0640    Order Status:  Completed Specimen:  Blood Updated:  11/08/19 0810     Special Requests: --        LEFT  HAND       Culture result: NO GROWTH 1 DAY       CULTURE, BLOOD [[id number]] Collected:  11/07/19 0646    Order Status:  Completed Specimen:  Blood Updated:  11/08/19 0810     Special Requests: --        LEFT  FOREARM       Culture result: NO GROWTH 1 DAY       CULTURE, BLOOD [[id number]] (Abnormal) Collected:  11/05/19 0713    Order Status:  Completed Specimen:  Blood Updated:  11/08/19 0721     Special Requests: --        LEFT  HAND       GRAM STAIN       GRAM POS COCCI IN CLUSTERS            AEROBIC BOTTLE POSITIVE               CRITICAL RESULT NOT CALLED DUE TO PREVIOUS NOTIFICATION OF CRITICAL RESULT WITHIN THE LAST 24 HOURS. Culture result: STAPHYLOCOCCUS AUREUS               For Susceptibility Refer to Culture  [facility] [id number]      CULTURE, BLOOD [[id number]]  (Abnormal)  (Susceptibility) Collected:  11/05/19 0705    Order Status:  Completed Specimen:  Blood Updated:  11/08/19 0719     Special Requests: --        RIGHT  HAND       GRAM STAIN GRAM POSITIVE COCCI         AEROBIC BOTTLE POSITIVE               RESULTS VERIFIED, PHONED TO AND READ BACK BY Ling Harding RN @ [facility]4 ON [id number] BY TVO           Culture result: STAPHYLOCOCCUS AUREUS         REFER TO BLOOD CULTURE IDENTIFICATION PANEL [id number]    CULTURE, URINE [[id number]]  (Abnormal)  (Susceptibility) Collected:  11/02/19 2003    Order Status:  Completed Specimen:  Cath Urine Updated:  11/06/19 0724     Special Requests: NO SPECIAL REQUESTS        Culture result:       >100,000 COLONIES/mL KLEBSIELLA PNEUMONIAE                  <1,000 CFU/ML MIXED SKIN JOEL ISOLATED          BLOOD CULTURE ID PANEL [[id number]]  (Abnormal) Collected:  11/05/19 0705    Order Status:  Completed Specimen:  Blood Updated:  11/06/19 0651     Acc. no. from Micro Order [id number]     Staphylococcus DETECTED        Staphylococcus aureus DETECTED        mecA (Methicillin-Resistance Genes) NOT DETECTED        INTERPRETATION       Gram positive cocci in clusters, identified in realtime PCR as probable MSSA. Clinical Consideration       Recommend discontinuing IV vancomycin starting cefazolin or nafcillin if patient not on beta-lactam therapy. Infectious Diseases Consult recommended in adult patients. THIS TEST DOES NOT REPLACE SENSITIVITY TESTING. CULTURE, BLOOD [[id number]]  (Abnormal) Collected:  11/02/19 2123    Order Status:  Completed Specimen:  Blood Updated:  11/05/19 0731     Special Requests: --        RIGHT  HAND       GRAM STAIN GRAM POSITIVE COCCI         AEROBIC BOTTLE POSITIVE               CRITICAL RESULT NOT CALLED DUE TO PREVIOUS NOTIFICATION OF CRITICAL RESULT WITHIN THE LAST 24 HOURS. Culture result: STAPHYLOCOCCUS AUREUS               For Susceptibility Refer to Culture  [facility] [id number]      CULTURE, BLOOD [[id number]]  (Abnormal)  (Susceptibility) Collected:  11/02/19 2123    Order Status:  Completed Specimen:  Blood Updated:  11/05/19 0729     Special Requests: --        RIGHT  Antecubital       GRAM STAIN GRAM POSITIVE COCCI               AEROBIC AND ANAEROBIC BOTTLES                  RESULTS VERIFIED, PHONED TO AND READ BACK BY Irving Melo RN AT 1013 11.3.19            Culture result: STAPHYLOCOCCUS AUREUS         REFER TO [facility] Felix Ware PANEL [facility] [id number]    BLOOD CULTURE ID PANEL [[id number]]  (Abnormal) Collected:  11/02/19 2123    Order Status:  Completed Specimen:  Blood Updated:  11/03/19 1318     Acc. no. from Micro Order [id number]     Staphylococcus DETECTED        Staphylococcus aureus DETECTED        mecA (Methicillin-Resistance Genes) NOT DETECTED        INTERPRETATION       Gram positive cocci in clusters, identified in realtime PCR as probable MSSA. Comment: RESULTS VERIFIED, PHONED TO AND READ BACK BY  Irving Melo RN AT 8247 11.3.19           Clinical Consideration       Recommend discontinuing IV vancomycin starting cefazolin or nafcillin if patient not on beta-lactam therapy. Infectious Diseases Consult recommended in adult patients. THIS TEST DOES NOT REPLACE SENSITIVITY TESTING. Imaging:   CT abd/pelvis (11/3/19)  IMPRESSION: No acute finding abdomen and pelvis    CXR (11/2/19)  IMPRESSION: No acute process    CT spine (11/1/19)  IMPRESSION:  1. Postoperative changes noted L3-L5.   2. Moderate bilateral neural foraminal narrowing present at L4-L5 and L5-S1  3. Mild bilateral neural foraminal narrowing noted at L3-L4.     CT myelogram (11/1/19)   Contrast readily flows throughout the lumbar region      Signed By: Dawson Mg MD     November 8, 2019

## 2019-11-09 LAB
GLUCOSE BLD STRIP.AUTO-MCNC: 202 MG/DL (ref 65–100)
GLUCOSE BLD STRIP.AUTO-MCNC: 232 MG/DL (ref 65–100)
GLUCOSE BLD STRIP.AUTO-MCNC: 238 MG/DL (ref 65–100)
GLUCOSE BLD STRIP.AUTO-MCNC: 304 MG/DL (ref 65–100)

## 2019-11-09 PROCEDURE — 74011250637 HC RX REV CODE- 250/637: Performed by: INTERNAL MEDICINE

## 2019-11-09 PROCEDURE — 74011250637 HC RX REV CODE- 250/637: Performed by: FAMILY MEDICINE

## 2019-11-09 PROCEDURE — 74011250636 HC RX REV CODE- 250/636: Performed by: FAMILY MEDICINE

## 2019-11-09 PROCEDURE — 82962 GLUCOSE BLOOD TEST: CPT

## 2019-11-09 PROCEDURE — 65270000029 HC RM PRIVATE

## 2019-11-09 PROCEDURE — 74011636637 HC RX REV CODE- 636/637: Performed by: INTERNAL MEDICINE

## 2019-11-09 PROCEDURE — 74011636637 HC RX REV CODE- 636/637: Performed by: FAMILY MEDICINE

## 2019-11-09 PROCEDURE — 74011250636 HC RX REV CODE- 250/636: Performed by: INTERNAL MEDICINE

## 2019-11-09 RX ORDER — INSULIN LISPRO 100 [IU]/ML
9 INJECTION, SOLUTION INTRAVENOUS; SUBCUTANEOUS
Status: DISCONTINUED | OUTPATIENT
Start: 2019-11-09 | End: 2019-11-11 | Stop reason: HOSPADM

## 2019-11-09 RX ORDER — FLUCONAZOLE 100 MG/1
150 TABLET ORAL
Status: COMPLETED | OUTPATIENT
Start: 2019-11-09 | End: 2019-11-09

## 2019-11-09 RX ORDER — INSULIN GLARGINE 100 [IU]/ML
30 INJECTION, SOLUTION SUBCUTANEOUS
Status: DISCONTINUED | OUTPATIENT
Start: 2019-11-09 | End: 2019-11-11 | Stop reason: HOSPADM

## 2019-11-09 RX ADMIN — TRAZODONE HYDROCHLORIDE 100 MG: 50 TABLET ORAL at 21:37

## 2019-11-09 RX ADMIN — MORPHINE SULFATE 30 MG: 30 TABLET, FILM COATED, EXTENDED RELEASE ORAL at 15:12

## 2019-11-09 RX ADMIN — HEPARIN SODIUM 5000 UNITS: 5000 INJECTION INTRAVENOUS; SUBCUTANEOUS at 15:12

## 2019-11-09 RX ADMIN — INSULIN LISPRO 7 UNITS: 100 INJECTION, SOLUTION INTRAVENOUS; SUBCUTANEOUS at 07:41

## 2019-11-09 RX ADMIN — INSULIN LISPRO 4 UNITS: 100 INJECTION, SOLUTION INTRAVENOUS; SUBCUTANEOUS at 21:38

## 2019-11-09 RX ADMIN — INSULIN LISPRO 7 UNITS: 100 INJECTION, SOLUTION INTRAVENOUS; SUBCUTANEOUS at 12:09

## 2019-11-09 RX ADMIN — INSULIN LISPRO 4 UNITS: 100 INJECTION, SOLUTION INTRAVENOUS; SUBCUTANEOUS at 07:40

## 2019-11-09 RX ADMIN — INSULIN GLARGINE 30 UNITS: 100 INJECTION, SOLUTION SUBCUTANEOUS at 21:38

## 2019-11-09 RX ADMIN — Medication 2 G: at 05:40

## 2019-11-09 RX ADMIN — INSULIN LISPRO 4 UNITS: 100 INJECTION, SOLUTION INTRAVENOUS; SUBCUTANEOUS at 16:41

## 2019-11-09 RX ADMIN — Medication 2 G: at 21:39

## 2019-11-09 RX ADMIN — Medication 10 ML: at 14:00

## 2019-11-09 RX ADMIN — Medication 2 G: at 12:49

## 2019-11-09 RX ADMIN — OXYCODONE HYDROCHLORIDE 10 MG: 5 TABLET ORAL at 07:42

## 2019-11-09 RX ADMIN — Medication 250 MG: at 07:42

## 2019-11-09 RX ADMIN — GABAPENTIN 400 MG: 400 CAPSULE ORAL at 21:37

## 2019-11-09 RX ADMIN — MORPHINE SULFATE 30 MG: 30 TABLET, FILM COATED, EXTENDED RELEASE ORAL at 03:59

## 2019-11-09 RX ADMIN — LEVOTHYROXINE SODIUM 150 MCG: 100 TABLET ORAL at 05:38

## 2019-11-09 RX ADMIN — HYDROMORPHONE HYDROCHLORIDE 1 MG: 1 INJECTION, SOLUTION INTRAMUSCULAR; INTRAVENOUS; SUBCUTANEOUS at 20:03

## 2019-11-09 RX ADMIN — SENNOSIDES AND DOCUSATE SODIUM 2 TABLET: 8.6; 5 TABLET ORAL at 07:43

## 2019-11-09 RX ADMIN — FLUCONAZOLE 150 MG: 100 TABLET ORAL at 14:08

## 2019-11-09 RX ADMIN — FLUOXETINE 20 MG: 20 CAPSULE ORAL at 07:43

## 2019-11-09 RX ADMIN — HYDROMORPHONE HYDROCHLORIDE 1 MG: 1 INJECTION, SOLUTION INTRAMUSCULAR; INTRAVENOUS; SUBCUTANEOUS at 02:18

## 2019-11-09 RX ADMIN — HEPARIN SODIUM 5000 UNITS: 5000 INJECTION INTRAVENOUS; SUBCUTANEOUS at 05:38

## 2019-11-09 RX ADMIN — OXYCODONE HYDROCHLORIDE 10 MG: 5 TABLET ORAL at 22:39

## 2019-11-09 RX ADMIN — Medication 10 ML: at 05:38

## 2019-11-09 RX ADMIN — INSULIN LISPRO 9 UNITS: 100 INJECTION, SOLUTION INTRAVENOUS; SUBCUTANEOUS at 16:42

## 2019-11-09 RX ADMIN — GABAPENTIN 400 MG: 400 CAPSULE ORAL at 15:11

## 2019-11-09 RX ADMIN — ROSUVASTATIN CALCIUM 20 MG: 20 TABLET, FILM COATED ORAL at 21:37

## 2019-11-09 RX ADMIN — HEPARIN SODIUM 5000 UNITS: 5000 INJECTION INTRAVENOUS; SUBCUTANEOUS at 21:38

## 2019-11-09 RX ADMIN — BISACODYL 5 MG: 5 TABLET, COATED ORAL at 07:43

## 2019-11-09 RX ADMIN — HYDROMORPHONE HYDROCHLORIDE 1 MG: 1 INJECTION, SOLUTION INTRAMUSCULAR; INTRAVENOUS; SUBCUTANEOUS at 10:37

## 2019-11-09 RX ADMIN — INSULIN LISPRO 8 UNITS: 100 INJECTION, SOLUTION INTRAVENOUS; SUBCUTANEOUS at 12:08

## 2019-11-09 RX ADMIN — OXYCODONE HYDROCHLORIDE 10 MG: 5 TABLET ORAL at 12:49

## 2019-11-09 RX ADMIN — POLYETHYLENE GLYCOL 3350 17 G: 17 POWDER, FOR SOLUTION ORAL at 07:41

## 2019-11-09 RX ADMIN — GABAPENTIN 400 MG: 400 CAPSULE ORAL at 07:42

## 2019-11-09 RX ADMIN — Medication 10 ML: at 21:51

## 2019-11-09 RX ADMIN — CLONAZEPAM 0.5 MG: 0.5 TABLET ORAL at 22:39

## 2019-11-09 NOTE — PROGRESS NOTES
ORTH FRACTURE PROGRESS NOTE    2019  Admit Date:   2019    Post Op day: * No surgery found *    Subjective:    Cuco Em Improved hand pain and swelling right  PT/OT:   Gait:                    Vital Signs:    Patient Vitals for the past 8 hrs:   BP Temp Pulse Resp SpO2   19 1612 131/67 98.8 °F (37.1 °C) 71 18 97 %   19 1136 133/72 98.4 °F (36.9 °C) 87 18 92 %     Temp (24hrs), Av.3 °F (36.8 °C), Min:98.1 °F (36.7 °C), Max:98.8 °F (37.1 °C)      Pain Control:   Pain Assessment  Pain Scale 1: Numeric (0 - 10)  Pain Intensity 1: 0  Pain Onset 1: acute/chronic  Pain Location 1: Back, Hip, Leg  Pain Orientation 1: Left, Lower  Pain Description 1: Aching, Constant  Pain Intervention(s) 1: Medication (see MAR)    Meds:    Current Facility-Administered Medications   Medication Dose Route Frequency    HYDROmorphone (PF) (DILAUDID) injection 1 mg  1 mg IntraVENous ONCE    insulin lispro (HUMALOG) injection 7 Units  7 Units SubCUTAneous TIDAC    insulin glargine (LANTUS) injection 25 Units  25 Units SubCUTAneous QHS    nicotine (NICODERM CQ) 21 mg/24 hr patch 1 Patch  1 Patch TransDERmal Q24H    Saccharomyces boulardii (FLORASTOR) capsule 500 mg  500 mg Oral DAILY    polyethylene glycol (MIRALAX) packet 17 g  17 g Oral DAILY PRN    senna-docusate (PERICOLACE) 8.6-50 mg per tablet 2 Tab  2 Tab Oral DAILY    clonazePAM (KlonoPIN) tablet 0.5 mg  0.5 mg Oral Q6H PRN    FLUoxetine (PROzac) capsule 20 mg  20 mg Oral DAILY    fluticasone propionate (FLONASE) 50 mcg/actuation nasal spray 2 Spray  2 Spray Both Nostrils DAILY    albuterol (PROVENTIL VENTOLIN) nebulizer solution 2.5 mg  2.5 mg Nebulization Q4H PRN    gabapentin (NEURONTIN) capsule 400 mg  400 mg Oral TID    levothyroxine (SYNTHROID) tablet 150 mcg  150 mcg Oral 6am    [Held by provider] lisinopril (PRINIVIL, ZESTRIL) tablet 10 mg  10 mg Oral DAILY    HYDROmorphone (PF) (DILAUDID) injection 1 mg  1 mg IntraVENous Q4H PRN    rosuvastatin (CRESTOR) tablet 20 mg  20 mg Oral QHS    traZODone (DESYREL) tablet 100 mg  100 mg Oral QHS    insulin lispro (HUMALOG) injection   SubCUTAneous AC&HS    sodium chloride (NS) flush 5-40 mL  5-40 mL IntraVENous Q8H    sodium chloride (NS) flush 5-40 mL  5-40 mL IntraVENous PRN    oxyCODONE IR (ROXICODONE) tablet 10 mg  10 mg Oral Q4H PRN    diphenhydrAMINE (BENADRYL) capsule 25 mg  25 mg Oral Q4H PRN    ondansetron (ZOFRAN) injection 4 mg  4 mg IntraVENous Q4H PRN    bisacodyl (DULCOLAX) tablet 5 mg  5 mg Oral DAILY PRN    heparin (porcine) injection 5,000 Units  5,000 Units SubCUTAneous Q8H    morphine CR (MS CONTIN) tablet 30 mg  30 mg Oral Q12H    ceFAZolin (ANCEF) 2 g/20 mL in sterile water IV syringe  2 g IntraVENous Q8H       LAB:    Recent Labs     11/08/19  0617   HCT 39.3   HGB 12.5       24 Hour Assessment Issues:    Oriented    Discharge Planning:     Transfuse PRBC's:      Assessment & Physician's Comment:  Moderated dorsal swelling. good motion    Principal Problem:    Sepsis due to methicillin susceptible Staphylococcus aureus (HCC) (11/3/2019)    Active Problems:    Chronic pain syndrome (9/24/2019)      MSSA bacteremia (11/3/2019)      Intractable low back pain (11/3/2019)      Diabetes ([address] (11/3/2019)        Plan:Improving  Hand infection.    Will continue to follow      Iwona Alonzo,

## 2019-11-09 NOTE — PROGRESS NOTES
Hospitalist Progress Note    2019  Admit Date: 2019  7:34 PM   NAME: Iwona Loaiza   :  1960   DOS:              19  MRN:  [id number]   Attending: Karrie Carr MD  PCP:  Noa Morocho MD  Treatment Team: Attending Provider: Rosie Sexton MD; Utilization Review: Kaylee Monet; Consulting Provider: Beckie Alberto MD; Care Manager: [facility]; Consulting Provider: Savi Tucker MD    Full Code     SUBJECTIVE:   As previously documented: 59yo F wit hx anxiety/depression, DM, factor 5, PE, and chronic low back pain who presented with acute worsening of back pain. Has 20y hx back pain and follows pain mgmt and neurosurgery. Has stimulator implanted in back.  At a baseline, is functional for ADLs, takes MS contin 15 and prn norco.  c/o 5 days of severe worsening of low back pain radiating into L hip/glute area. fevers of up to 103 for several days, breaks with sweating, then feels better for a few days. Patient unable to have MRI due to spinal simulator. 19    Iwona Loaiza  Reported back pain is somewhat controlled. Daughter at bedside all questions answered. Patient reported having some vaginal itchiness, ?discharge. 10+ ROS reviewed and negative except for positive in HPI.    Allergies   Allergen Reactions    Latex, Natural Rubber Contact Dermatitis    Silver Nitrate Applicators Rash     Current Facility-Administered Medications   Medication Dose Route Frequency    insulin lispro (HUMALOG) injection 7 Units  7 Units SubCUTAneous TIDAC    insulin glargine (LANTUS) injection 25 Units  25 Units SubCUTAneous QHS    nicotine (NICODERM CQ) 21 mg/24 hr patch 1 Patch  1 Patch TransDERmal Q24H    Saccharomyces boulardii (FLORASTOR) capsule 500 mg  500 mg Oral DAILY    polyethylene glycol (MIRALAX) packet 17 g  17 g Oral DAILY PRN    senna-docusate (PERICOLACE) 8.6-50 mg per tablet 2 Tab  2 Tab Oral DAILY    clonazePAM (KlonoPIN) tablet 0.5 mg  0.5 mg Oral Q6H PRN    FLUoxetine (PROzac) capsule 20 mg  20 mg Oral DAILY    fluticasone propionate (FLONASE) 50 mcg/actuation nasal spray 2 Spray  2 Spray Both Nostrils DAILY    albuterol (PROVENTIL VENTOLIN) nebulizer solution 2.5 mg  2.5 mg Nebulization Q4H PRN    gabapentin (NEURONTIN) capsule 400 mg  400 mg Oral TID    levothyroxine (SYNTHROID) tablet 150 mcg  150 mcg Oral 6am    [Held by provider] lisinopril (PRINIVIL, ZESTRIL) tablet 10 mg  10 mg Oral DAILY    HYDROmorphone (PF) (DILAUDID) injection 1 mg  1 mg IntraVENous Q4H PRN    rosuvastatin (CRESTOR) tablet 20 mg  20 mg Oral QHS    traZODone (DESYREL) tablet 100 mg  100 mg Oral QHS    insulin lispro (HUMALOG) injection   SubCUTAneous AC&HS    sodium chloride (NS) flush 5-40 mL  5-40 mL IntraVENous Q8H    sodium chloride (NS) flush 5-40 mL  5-40 mL IntraVENous PRN    oxyCODONE IR (ROXICODONE) tablet 10 mg  10 mg Oral Q4H PRN    diphenhydrAMINE (BENADRYL) capsule 25 mg  25 mg Oral Q4H PRN    ondansetron (ZOFRAN) injection 4 mg  4 mg IntraVENous Q4H PRN    bisacodyl (DULCOLAX) tablet 5 mg  5 mg Oral DAILY PRN    heparin (porcine) injection 5,000 Units  5,000 Units SubCUTAneous Q8H    morphine CR (MS CONTIN) tablet 30 mg  30 mg Oral Q12H    ceFAZolin (ANCEF) 2 g/20 mL in sterile water IV syringe  2 g IntraVENous Q8H         Immunization History   Administered Date(s) Administered    TB Skin Test (PPD) Intradermal 2019     Objective:     Patient Vitals for the past 24 hrs:   Temp Pulse Resp BP SpO2   19 1045 98.2 °F (36.8 °C) 69 16 116/62 94 %   19 0844 98.6 °F (37 °C) 76 16 108/55 92 %   19 0602 97.3 °F (36.3 °C) 66 18 159/84 95 %   19 0014 97.6 °F (36.4 °C) 66 18 107/67 97 %   19 98.3 °F (36.8 °C) 75 18 124/75 92 %   19 1612 98.8 °F (37.1 °C) 71 18 131/67 97 %     Temp (24hrs), Av.1 °F (36.7 °C), Min:97.3 °F (36.3 °C), Max:98.8 °F (37.1 °C)    Oxygen Therapy  O2 Sat (%): 94 % (11/09/19 1045)  Pulse via Oximetry: 80 beats per minute (11/06/19 1950)  O2 Device: Nasal cannula (11/06/19 1950)  O2 Flow Rate (L/min): 2 l/min (11/06/19 1950)  Oxygen Therapy  O2 Sat (%): 94 % (11/09/19 1045)  Pulse via Oximetry: 80 beats per minute (11/06/19 1950)  O2 Device: Nasal cannula (11/06/19 1950)  O2 Flow Rate (L/min): 2 l/min (11/06/19 1950)    Physical Exam:  General:         Alert, cooperative, no distress   Lungs: No wheezing/rhonchi/rales  Cardiovascular:   RRR. No m/r/g. No pedal edema b/l. Abdomen:       S/nt/nd. Bowel sounds normal. .   Neurologic:   No gross focal deficit.   Extremities:         R hand with minimal tenderness, no erythema               DIAGNOSTIC STUDIES      Data Review:   Recent Results (from the past 24 hour(s))   GLUCOSE, POC    Collection Time: 11/08/19  4:15 PM   Result Value Ref Range    Glucose (POC) 231 (H) 65 - 100 mg/dL   GLUCOSE, POC    Collection Time: 11/08/19  8:10 PM   Result Value Ref Range    Glucose (POC) 214 (H) 65 - 100 mg/dL   GLUCOSE, POC    Collection Time: 11/09/19  7:21 AM   Result Value Ref Range    Glucose (POC) 238 (H) 65 - 100 mg/dL   GLUCOSE, POC    Collection Time: 11/09/19 10:57 AM   Result Value Ref Range    Glucose (POC) 304 (H) 65 - 100 mg/dL       All Micro Results     Procedure Component Value Units Date/Time    CULTURE, BLOOD [[id number]] Collected:  11/07/19 0640    Order Status:  Completed Specimen:  Blood Updated:  11/09/19 0620     Special Requests: --        LEFT  HAND       Culture result: NO GROWTH 2 DAYS       CULTURE, BLOOD [[id number]] Collected:  11/07/19 0646    Order Status:  Completed Specimen:  Blood Updated:  11/09/19 0620     Special Requests: --        LEFT  FOREARM       Culture result: NO GROWTH 2 DAYS       CULTURE, BLOOD [[id number]]  (Abnormal) Collected:  11/05/19 0713    Order Status:  Completed Specimen:  Blood Updated:  11/08/19 0721     Special Requests: -- LEFT  HAND       GRAM STAIN       GRAM POS COCCI IN CLUSTERS            AEROBIC BOTTLE POSITIVE               CRITICAL RESULT NOT CALLED DUE TO PREVIOUS NOTIFICATION OF CRITICAL RESULT WITHIN THE LAST 24 HOURS. Culture result: STAPHYLOCOCCUS AUREUS               For Susceptibility Refer to Culture  [facility] [place] [id number]      CULTURE, BLOOD [[id number]]  (Abnormal)  (Susceptibility) Collected:  11/05/19 0705    Order Status:  Completed Specimen:  Blood Updated:  11/08/19 0719     Special Requests: --        RIGHT  HAND       GRAM STAIN GRAM POSITIVE COCCI         AEROBIC BOTTLE POSITIVE               RESULTS VERIFIED, PHONED TO AND READ BACK BY Austin Pichardo RN @ 6952 ON [id number] BY TVO           Culture result: STAPHYLOCOCCUS AUREUS         REFER TO BLOOD CULTURE IDENTIFICATION PANEL [id number]    CULTURE, URINE [[id number]]  (Abnormal)  (Susceptibility) Collected:  11/02/19 2003    Order Status:  Completed Specimen:  Cath Urine Updated:  11/06/19 0724     Special Requests: NO SPECIAL REQUESTS        Culture result:       >100,000 COLONIES/mL KLEBSIELLA PNEUMONIAE                  <1,000 CFU/ML MIXED SKIN JOEL ISOLATED          BLOOD CULTURE ID PANEL [[id number]]  (Abnormal) Collected:  11/05/19 0705    Order Status:  Completed Specimen:  Blood Updated:  11/06/19 0651     Acc. no. from Micro Order [id number]     Staphylococcus DETECTED        Staphylococcus aureus DETECTED        mecA (Methicillin-Resistance Genes) NOT DETECTED        INTERPRETATION       Gram positive cocci in clusters, identified in realtime PCR as probable MSSA. Clinical Consideration       Recommend discontinuing IV vancomycin starting cefazolin or nafcillin if patient not on beta-lactam therapy. Infectious Diseases Consult recommended in adult patients. THIS TEST DOES NOT REPLACE SENSITIVITY TESTING.           CULTURE, BLOOD [[id number]]  (Abnormal) Collected:  11/02/19 2123    Order Status:  Completed Specimen:  Blood Updated:  11/05/19 8164 Special Requests: --        RIGHT  HAND       GRAM STAIN GRAM POSITIVE COCCI         AEROBIC BOTTLE POSITIVE               CRITICAL RESULT NOT CALLED DUE TO PREVIOUS NOTIFICATION OF CRITICAL RESULT WITHIN THE LAST 24 HOURS. Culture result: STAPHYLOCOCCUS AUREUS               For Susceptibility Refer to Culture  [facility] [id number]      CULTURE, BLOOD [[id number]]  (Abnormal)  (Susceptibility) Collected:  11/02/19 2123    Order Status:  Completed Specimen:  Blood Updated:  11/05/19 0729     Special Requests: --        RIGHT  Antecubital       GRAM STAIN GRAM POSITIVE COCCI               AEROBIC AND ANAEROBIC BOTTLES                  RESULTS VERIFIED, PHONED TO AND READ BACK BY Marysol Rao RN AT 8397 11.3.19            Culture result: STAPHYLOCOCCUS AUREUS         REFER  [address] PANEL [facility] [id number]    BLOOD CULTURE ID PANEL [[id number]]  (Abnormal) Collected:  11/02/19 2123    Order Status:  Completed Specimen:  Blood Updated:  11/03/19 1318     Acc. no. from Micro Order [id number]     Staphylococcus DETECTED        Staphylococcus aureus DETECTED        mecA (Methicillin-Resistance Genes) NOT DETECTED        INTERPRETATION       Gram positive cocci in clusters, identified in realtime PCR as probable MSSA. Comment: RESULTS VERIFIED, PHONED TO AND READ BACK BY  Marysol Rao RN AT 0220 11.3.19           Clinical Consideration       Recommend discontinuing IV vancomycin starting cefazolin or nafcillin if patient not on beta-lactam therapy. Infectious Diseases Consult recommended in adult patients. THIS TEST DOES NOT REPLACE SENSITIVITY TESTING. Imaging /Procedures /Studies:    CXR Results  (Last 48 hours)    None        CT Results  (Last 48 hours)    None        No results found.   Results for orders placed or performed during the hospital encounter of 11/02/19   2D ECHO COMPLETE ADULT (TTE) W  [place] [address] [facility], [address] [place] [address]  [phone]    Transthoracic Echocardiogram  2D, M-mode, Doppler, and Color Doppler    Patient: Jessenia Stark  MR #: [id number]  : 15-Leonardo-1960  Age: 62 years  Gender: Female  Study date: 2019  Account #: [de-identified]  Height: 61 in  Weight: 145.6 lb  BSA: 1.65 mï¾²  Status:Routine  Location: [facility]  BP: 108/ 62    Allergies: LATEX, NATURAL RUBBER, SILVER NITRATE APPLICATORS    Sonographer:  Elham Eastman [facility]  Group:  [id number] [facility]  Referring Physician:  Hari Garcia. Libby Perea MD  Reading Physician:  Alhaji Daniel MD    INDICATIONS: Staph Bacteremia  *Technically difficult study. Patient scanned sitting inclined due to severe  left hip and leg pain when laying on left side or supine. PROCEDURE: This was a routine study. A transthoracic echocardiogram was  performed. The study included complete 2D imaging, M-mode, complete spectral  Doppler, and color Doppler. Intravenous contrast (Definity) was administered. Echocardiographic views were limited by restricted patient mobility and poor  patient compliance. This was a technically difficult study. LEFT VENTRICLE: Size was normal. Systolic function was normal. Ejection  fraction was estimated in the range of 60 % to 65 %. There were no regional  wall motion abnormalities. Wall thickness was normal. Left ventricular  diastolic function parameters were normal. Avg E/e': 10.35.    RIGHT VENTRICLE: The size was normal. Systolic function was normal. The  tricuspid jet envelope definition was inadequate for estimation of RV   systolic  pressure. LEFT ATRIUM: Size was normal.    RIGHT ATRIUM: Size was normal.    SYSTEMIC VEINS: IVC: The inferior vena cava was normal in size and course. The  respirophasic change in diameter was more than 50%. AORTIC VALVE: The valve was trileaflet. Leaflets exhibited mild sclerosis.   Although there was no diagnostic evidence for vegetation, this study is not  adequate to completely exclude the possibility. There was no evidence for  stenosis. There was no insufficiency. MITRAL VALVE: Valve structure was normal. Although there was no diagnostic  evidence for vegetation, this study is not adequate to completely exclude the  possibility. There was no evidence for stenosis. There was no regurgitation. TRICUSPID VALVE: Not well visualized. Although there was no diagnostic   evidence  for vegetation, this study is not adequate to completely exclude the  possibility. There was no evidence for stenosis. There was no regurgitation. PULMONIC VALVE: Although there was no diagnostic evidence for vegetation,   this  study is not adequate to completely exclude the possibility. Not well  visualized. There was no evidence for stenosis. There was no insufficiency. PERICARDIUM: There was no pericardial effusion. AORTA: The root exhibited normal size. SUMMARY:    -  Left ventricle: Systolic function was normal. Ejection fraction was  estimated in the range of 60 % to 65 %. There were no regional wall motion  abnormalities. -  Aortic valve: Although there was no diagnostic evidence for vegetation,   this  study is not adequate to completely exclude the possibility.    -  Mitral valve: Although there was no diagnostic evidence for vegetation,   this  study is not adequate to completely exclude the possibility.    -  Tricuspid valve: Although there was no diagnostic evidence for vegetation,  this study is not adequate to completely exclude the possibility. SYSTEM MEASUREMENT TABLES    2D mode  AoR Diam (2D): 3.1 cm  LA Dimension (2D): 3.7 cm  Left Atrium Systolic Volume Index; Method of Disks, Biplane; 2D mode;: 33.3  ml/m2  IVS/LVPW (2D): 1.1  IVSd (2D): 1 cm  LVIDd (2D): 5.2 cm  LVIDs (2D): 3.2 cm  LVOT Area (2D): 2.8 cm2  LVPWd (2D): 1 cm  RVIDd (2D): 2.9 cm    Unspecified Scan Mode  Peak Grad; Mean; Antegrade Flow: 9 mm[Hg]  Vmax;  Antegrade Flow: 151 cm/s  LVOT Diam: 1.9 cm    Prepared and signed by    Ashley Benson MD  Signed [id number] 14:05:41         Labs and Studies from previous 24 hours have been personally reviewed by myself    ASSESSMENT      Active Hospital Problems    Diagnosis Date Noted    MSSA bacteremia 11/03/2019    Intractable low back pain 11/03/2019    Sepsis due to methicillin susceptible Staphylococcus aureus ([facility] 11/03/2019    Diabetes ([facility] 11/03/2019    Chronic pain syndrome 09/24/2019     Hospital Problems as of 11/9/2019 Never Reviewed          Codes Class Noted - Resolved POA    MSSA bacteremia ICD-10-CM: R78.81  ICD-9-CM: 790.7, 041.11  11/3/2019 - Present Yes        Intractable low back pain ICD-10-CM: M54.5  ICD-9-CM: 724.2  11/3/2019 - Present Yes        * (Principal) Sepsis due to methicillin susceptible Staphylococcus aureus ([facility] ICD-10-CM: A41.01  ICD-9-CM: 038.11, 995.91  11/3/2019 - Present Yes        Diabetes ([facility] (Chronic) ICD-10-CM: E11.9  ICD-9-CM: 250.00  11/3/2019 - Present Yes        Chronic pain syndrome (Chronic) ICD-10-CM: G89.4  ICD-9-CM: 338.4  9/24/2019 - Present Yes        RESOLVED: Nausea & vomiting ICD-10-CM: R11.2  ICD-9-CM: 787.01  11/3/2019 - 11/3/2019 Yes              A/P:    -MSSA bacteremia   BC with MSSA, repeated BC NG  ID - abxs duration 6-8 weeks- Plan for outpatient abxs on Monday    Cont IV cefazolin      -R hand swelling  Improving   CT R hand with no acute findings  Ortho evaluation appreciated  Cont abxs    -Chronic back pain  Overall controlled  Cont pain management    -DM  Uncontrolled  Increase  Lantus to 30 units and increase pre meal coverage to 9 units    -Candida vulvovaginitis  Likely secondary to abxs  Give Fluconazole x 1 150 mg      DVT Prophylaxis: heparin  CODE Status: Full      Maykel Campbell MD  11/09/19

## 2019-11-09 NOTE — PROGRESS NOTES
Hourly rounds performed. All needs met. Bed is in low position and call light is within reach. Pt complained of pain twice during shift and pain was managed with PRN Dilaudid. Will continue to monitor and report to oncoming nurse.

## 2019-11-10 LAB
GLUCOSE BLD STRIP.AUTO-MCNC: 172 MG/DL (ref 65–100)
GLUCOSE BLD STRIP.AUTO-MCNC: 189 MG/DL (ref 65–100)
GLUCOSE BLD STRIP.AUTO-MCNC: 205 MG/DL (ref 65–100)
GLUCOSE BLD STRIP.AUTO-MCNC: 209 MG/DL (ref 65–100)

## 2019-11-10 PROCEDURE — 74011636637 HC RX REV CODE- 636/637: Performed by: FAMILY MEDICINE

## 2019-11-10 PROCEDURE — C1751 CATH, INF, PER/CENT/MIDLINE: HCPCS

## 2019-11-10 PROCEDURE — 74011250636 HC RX REV CODE- 250/636: Performed by: INTERNAL MEDICINE

## 2019-11-10 PROCEDURE — 74011250637 HC RX REV CODE- 250/637: Performed by: INTERNAL MEDICINE

## 2019-11-10 PROCEDURE — 74011250636 HC RX REV CODE- 250/636: Performed by: FAMILY MEDICINE

## 2019-11-10 PROCEDURE — 65270000029 HC RM PRIVATE

## 2019-11-10 PROCEDURE — 82962 GLUCOSE BLOOD TEST: CPT

## 2019-11-10 PROCEDURE — 74011250637 HC RX REV CODE- 250/637: Performed by: FAMILY MEDICINE

## 2019-11-10 PROCEDURE — 74011636637 HC RX REV CODE- 636/637: Performed by: INTERNAL MEDICINE

## 2019-11-10 RX ORDER — HEPARIN 100 UNIT/ML
300 SYRINGE INTRAVENOUS AS NEEDED
Status: DISCONTINUED | OUTPATIENT
Start: 2019-11-10 | End: 2019-11-11 | Stop reason: HOSPADM

## 2019-11-10 RX ORDER — SODIUM CHLORIDE 0.9 % (FLUSH) 0.9 %
10 SYRINGE (ML) INJECTION EVERY 8 HOURS
Status: DISCONTINUED | OUTPATIENT
Start: 2019-11-10 | End: 2019-11-11 | Stop reason: HOSPADM

## 2019-11-10 RX ORDER — SODIUM CHLORIDE 0.9 % (FLUSH) 0.9 %
10 SYRINGE (ML) INJECTION AS NEEDED
Status: DISCONTINUED | OUTPATIENT
Start: 2019-11-10 | End: 2019-11-11 | Stop reason: HOSPADM

## 2019-11-10 RX ORDER — HEPARIN 100 UNIT/ML
300 SYRINGE INTRAVENOUS EVERY 8 HOURS
Status: DISCONTINUED | OUTPATIENT
Start: 2019-11-10 | End: 2019-11-11 | Stop reason: HOSPADM

## 2019-11-10 RX ADMIN — Medication 10 ML: at 14:00

## 2019-11-10 RX ADMIN — CLONAZEPAM 0.5 MG: 0.5 TABLET ORAL at 21:27

## 2019-11-10 RX ADMIN — Medication 10 ML: at 21:38

## 2019-11-10 RX ADMIN — INSULIN LISPRO 2 UNITS: 100 INJECTION, SOLUTION INTRAVENOUS; SUBCUTANEOUS at 11:45

## 2019-11-10 RX ADMIN — HEPARIN SODIUM 5000 UNITS: 5000 INJECTION INTRAVENOUS; SUBCUTANEOUS at 05:18

## 2019-11-10 RX ADMIN — GABAPENTIN 400 MG: 400 CAPSULE ORAL at 21:27

## 2019-11-10 RX ADMIN — HYDROMORPHONE HYDROCHLORIDE 1 MG: 1 INJECTION, SOLUTION INTRAMUSCULAR; INTRAVENOUS; SUBCUTANEOUS at 19:23

## 2019-11-10 RX ADMIN — GABAPENTIN 400 MG: 400 CAPSULE ORAL at 07:43

## 2019-11-10 RX ADMIN — BISACODYL 5 MG: 5 TABLET, COATED ORAL at 07:44

## 2019-11-10 RX ADMIN — Medication 2 G: at 11:41

## 2019-11-10 RX ADMIN — Medication 2 G: at 21:28

## 2019-11-10 RX ADMIN — OXYCODONE HYDROCHLORIDE 10 MG: 5 TABLET ORAL at 09:39

## 2019-11-10 RX ADMIN — TRAZODONE HYDROCHLORIDE 100 MG: 50 TABLET ORAL at 21:28

## 2019-11-10 RX ADMIN — GABAPENTIN 400 MG: 400 CAPSULE ORAL at 17:03

## 2019-11-10 RX ADMIN — HEPARIN SODIUM 5000 UNITS: 5000 INJECTION INTRAVENOUS; SUBCUTANEOUS at 21:28

## 2019-11-10 RX ADMIN — INSULIN LISPRO 9 UNITS: 100 INJECTION, SOLUTION INTRAVENOUS; SUBCUTANEOUS at 17:02

## 2019-11-10 RX ADMIN — MORPHINE SULFATE 30 MG: 30 TABLET, FILM COATED, EXTENDED RELEASE ORAL at 14:36

## 2019-11-10 RX ADMIN — Medication 10 ML: at 17:00

## 2019-11-10 RX ADMIN — HYDROMORPHONE HYDROCHLORIDE 1 MG: 1 INJECTION, SOLUTION INTRAMUSCULAR; INTRAVENOUS; SUBCUTANEOUS at 05:25

## 2019-11-10 RX ADMIN — OXYCODONE HYDROCHLORIDE 10 MG: 5 TABLET ORAL at 17:03

## 2019-11-10 RX ADMIN — Medication 500 MG: at 07:44

## 2019-11-10 RX ADMIN — INSULIN LISPRO 9 UNITS: 100 INJECTION, SOLUTION INTRAVENOUS; SUBCUTANEOUS at 07:42

## 2019-11-10 RX ADMIN — INSULIN LISPRO 2 UNITS: 100 INJECTION, SOLUTION INTRAVENOUS; SUBCUTANEOUS at 17:01

## 2019-11-10 RX ADMIN — INSULIN LISPRO 9 UNITS: 100 INJECTION, SOLUTION INTRAVENOUS; SUBCUTANEOUS at 11:46

## 2019-11-10 RX ADMIN — INSULIN LISPRO 4 UNITS: 100 INJECTION, SOLUTION INTRAVENOUS; SUBCUTANEOUS at 21:28

## 2019-11-10 RX ADMIN — ROSUVASTATIN CALCIUM 20 MG: 20 TABLET, FILM COATED ORAL at 21:27

## 2019-11-10 RX ADMIN — Medication 2 G: at 05:18

## 2019-11-10 RX ADMIN — SENNOSIDES AND DOCUSATE SODIUM 2 TABLET: 8.6; 5 TABLET ORAL at 07:43

## 2019-11-10 RX ADMIN — FLUOXETINE 20 MG: 20 CAPSULE ORAL at 07:44

## 2019-11-10 RX ADMIN — HYDROMORPHONE HYDROCHLORIDE 1 MG: 1 INJECTION, SOLUTION INTRAMUSCULAR; INTRAVENOUS; SUBCUTANEOUS at 12:55

## 2019-11-10 RX ADMIN — HEPARIN SODIUM 5000 UNITS: 5000 INJECTION INTRAVENOUS; SUBCUTANEOUS at 14:36

## 2019-11-10 RX ADMIN — INSULIN LISPRO 4 UNITS: 100 INJECTION, SOLUTION INTRAVENOUS; SUBCUTANEOUS at 07:41

## 2019-11-10 RX ADMIN — INSULIN GLARGINE 30 UNITS: 100 INJECTION, SOLUTION SUBCUTANEOUS at 21:28

## 2019-11-10 RX ADMIN — Medication 10 ML: at 05:30

## 2019-11-10 RX ADMIN — Medication 10 ML: at 21:29

## 2019-11-10 RX ADMIN — POLYETHYLENE GLYCOL 3350 17 G: 17 POWDER, FOR SOLUTION ORAL at 07:42

## 2019-11-10 RX ADMIN — LEVOTHYROXINE SODIUM 150 MCG: 100 TABLET ORAL at 05:17

## 2019-11-10 RX ADMIN — MORPHINE SULFATE 30 MG: 30 TABLET, FILM COATED, EXTENDED RELEASE ORAL at 03:09

## 2019-11-10 RX ADMIN — SODIUM CHLORIDE, PRESERVATIVE FREE 300 UNITS: 5 INJECTION INTRAVENOUS at 21:28

## 2019-11-10 RX ADMIN — SODIUM CHLORIDE, PRESERVATIVE FREE 300 UNITS: 5 INJECTION INTRAVENOUS at 17:02

## 2019-11-10 NOTE — PROGRESS NOTES
Hospitalist Progress Note    11/10/2019  Admit Date: 2019  7:34 PM   NAME: Gianni Blas   :  1960   DOS:              11/10/19  MRN:  [id number]   Attending: Meseret Angel MD  PCP:  Marizol Cantu MD  Treatment Team: Attending Provider: Fortino Rhodes MD; Utilization Review: Cynthia Scott; Consulting Provider: Sruthi Currie MD; Care Manager: Leslie Myers [facility] [place]; Consulting Provider: Darshan Lozano MD    Full Code     SUBJECTIVE:   As previously documented: 59yo F wit hx anxiety/depression, DM, factor 5, PE, and chronic low back pain who presented with acute worsening of back pain. Has 20y hx back pain and follows pain mgmt and neurosurgery. Has stimulator implanted in back.  At a baseline, is functional for ADLs, takes MS contin 15 and prn norco.  c/o 5 days of severe worsening of low back pain radiating into L hip/glute area. fevers of up to 103 for several days, breaks with sweating, then feels better for a few days. Patient unable to have MRI due to spinal simulator. 11/10/19    Gianni Blas  Reported having a BM per day. Denies any other complaints. 10+ ROS reviewed and negative except for positive in HPI.    Allergies   Allergen Reactions    Latex, Natural Rubber Contact Dermatitis    Silver Nitrate Applicators Rash     Current Facility-Administered Medications   Medication Dose Route Frequency    insulin glargine (LANTUS) injection 30 Units  30 Units SubCUTAneous QHS    insulin lispro (HUMALOG) injection 9 Units  9 Units SubCUTAneous TIDAC    nicotine (NICODERM CQ) 21 mg/24 hr patch 1 Patch  1 Patch TransDERmal Q24H    Saccharomyces boulardii (FLORASTOR) capsule 500 mg  500 mg Oral DAILY    polyethylene glycol (MIRALAX) packet 17 g  17 g Oral DAILY PRN    senna-docusate (PERICOLACE) 8.6-50 mg per tablet 2 Tab  2 Tab Oral DAILY    clonazePAM (KlonoPIN) tablet 0.5 mg  0.5 mg Oral Q6H PRN    FLUoxetine (PROzac) capsule 20 mg 20 mg Oral DAILY    fluticasone propionate (FLONASE) 50 mcg/actuation nasal spray 2 Spray  2 Spray Both Nostrils DAILY    albuterol (PROVENTIL VENTOLIN) nebulizer solution 2.5 mg  2.5 mg Nebulization Q4H PRN    gabapentin (NEURONTIN) capsule 400 mg  400 mg Oral TID    levothyroxine (SYNTHROID) tablet 150 mcg  150 mcg Oral 6am    [Held by provider] lisinopril (PRINIVIL, ZESTRIL) tablet 10 mg  10 mg Oral DAILY    HYDROmorphone (PF) (DILAUDID) injection 1 mg  1 mg IntraVENous Q4H PRN    rosuvastatin (CRESTOR) tablet 20 mg  20 mg Oral QHS    traZODone (DESYREL) tablet 100 mg  100 mg Oral QHS    insulin lispro (HUMALOG) injection   SubCUTAneous AC&HS    sodium chloride (NS) flush 5-40 mL  5-40 mL IntraVENous Q8H    sodium chloride (NS) flush 5-40 mL  5-40 mL IntraVENous PRN    oxyCODONE IR (ROXICODONE) tablet 10 mg  10 mg Oral Q4H PRN    diphenhydrAMINE (BENADRYL) capsule 25 mg  25 mg Oral Q4H PRN    ondansetron (ZOFRAN) injection 4 mg  4 mg IntraVENous Q4H PRN    bisacodyl (DULCOLAX) tablet 5 mg  5 mg Oral DAILY PRN    heparin (porcine) injection 5,000 Units  5,000 Units SubCUTAneous Q8H    morphine CR (MS CONTIN) tablet 30 mg  30 mg Oral Q12H    ceFAZolin (ANCEF) 2 g/20 mL in sterile water IV syringe  2 g IntraVENous Q8H         Immunization History   Administered Date(s) Administered    TB Skin Test (PPD) Intradermal 2019     Objective:     Patient Vitals for the past 24 hrs:   Temp Pulse Resp BP SpO2   11/10/19 0759 97.9 °F (36.6 °C) 67 18 128/77 97 %   11/10/19 0529 97.8 °F (36.6 °C) 75 18 116/70 96 %   11/10/19 0014 97.7 °F (36.5 °C) 69 18 108/63 100 %   19 1934 98.2 °F (36.8 °C) 64 18 111/66 95 %   19 1540 98.1 °F (36.7 °C) 66 16 110/62 95 %     Temp (24hrs), Av.9 °F (36.6 °C), Min:97.7 °F (36.5 °C), Max:98.2 °F (36.8 °C)    Oxygen Therapy  O2 Sat (%): 97 % (11/10/19 0759)  Pulse via Oximetry: 80 beats per minute (19)  O2 Device: Nasal cannula (19)  O2 Flow Rate (L/min): 2 l/min (11/06/19 1950)  Oxygen Therapy  O2 Sat (%): 97 % (11/10/19 0759)  Pulse via Oximetry: 80 beats per minute (11/06/19 1950)  O2 Device: Nasal cannula (11/06/19 1950)  O2 Flow Rate (L/min): 2 l/min (11/06/19 1950)    Physical Exam:  General:         Alert, cooperative, no distress   Lungs: No wheezing/rhonchi/rales  Cardiovascular:   RRR. No m/r/g. No pedal edema b/l. Abdomen:       S/nt/nd. Bowel sounds normal. .   Neurologic:   No gross focal deficit.   Extremities:         R hand with minimal tenderness, no erythema               DIAGNOSTIC STUDIES      Data Review:   Recent Results (from the past 24 hour(s))   GLUCOSE, POC    Collection Time: 11/09/19  4:09 PM   Result Value Ref Range    Glucose (POC) 202 (H) 65 - 100 mg/dL   GLUCOSE, POC    Collection Time: 11/09/19  9:31 PM   Result Value Ref Range    Glucose (POC) 232 (H) 65 - 100 mg/dL   GLUCOSE, POC    Collection Time: 11/10/19  7:17 AM   Result Value Ref Range    Glucose (POC) 205 (H) 65 - 100 mg/dL   GLUCOSE, POC    Collection Time: 11/10/19 10:46 AM   Result Value Ref Range    Glucose (POC) 172 (H) 65 - 100 mg/dL       All Micro Results     Procedure Component Value Units Date/Time    CULTURE, BLOOD [[id number]] Collected:  11/07/19 0640    Order Status:  Completed Specimen:  Blood Updated:  11/10/19 0635     Special Requests: --        LEFT  HAND       Culture result: NO GROWTH 3 DAYS       CULTURE, BLOOD [[id number]] Collected:  11/07/19 0646    Order Status:  Completed Specimen:  Blood Updated:  11/10/19 0635     Special Requests: --        LEFT  FOREARM       Culture result: NO GROWTH 3 DAYS       CULTURE, BLOOD [[id number]]  (Abnormal) Collected:  11/05/19 0713    Order Status:  Completed Specimen:  Blood Updated:  11/08/19 0721     Special Requests: --        LEFT  HAND       GRAM STAIN       GRAM POS COCCI IN CLUSTERS            AEROBIC BOTTLE POSITIVE               CRITICAL RESULT NOT CALLED DUE TO PREVIOUS NOTIFICATION OF CRITICAL RESULT WITHIN THE LAST 24 HOURS. Culture result: STAPHYLOCOCCUS AUREUS               For Susceptibility Refer to Culture  [facility] [id number]      CULTURE, BLOOD [[id number]]  (Abnormal)  (Susceptibility) Collected:  11/05/19 0705    Order Status:  Completed Specimen:  Blood Updated:  11/08/19 0719     Special Requests: --        RIGHT  HAND       GRAM STAIN GRAM POSITIVE COCCI         AEROBIC BOTTLE POSITIVE               RESULTS VERIFIED, PHONED TO AND READ BACK BY Darryle Sensor RN @ 5182 ON [id number] BY TVO           Culture result: STAPHYLOCOCCUS AUREUS         REFER TO BLOOD CULTURE IDENTIFICATION PANEL [id number]    CULTURE, URINE [[id number]]  (Abnormal)  (Susceptibility) Collected:  11/02/19 2003    Order Status:  Completed Specimen:  Cath Urine Updated:  11/06/19 0724     Special Requests: NO SPECIAL REQUESTS        Culture result:       >100,000 COLONIES/mL KLEBSIELLA PNEUMONIAE                  <1,000 CFU/ML MIXED SKIN JOEL ISOLATED          BLOOD CULTURE ID PANEL [[id number]]  (Abnormal) Collected:  11/05/19 0705    Order Status:  Completed Specimen:  Blood Updated:  11/06/19 0651     Acc. no. from Micro Order [id number]     Staphylococcus DETECTED        Staphylococcus aureus DETECTED        mecA (Methicillin-Resistance Genes) NOT DETECTED        INTERPRETATION       Gram positive cocci in clusters, identified in realtime PCR as probable MSSA. Clinical Consideration       Recommend discontinuing IV vancomycin starting cefazolin or nafcillin if patient not on beta-lactam therapy. Infectious Diseases Consult recommended in adult patients. THIS TEST DOES NOT REPLACE SENSITIVITY TESTING.           CULTURE, BLOOD [[id number]]  (Abnormal) Collected:  11/02/19 2123    Order Status:  Completed Specimen:  Blood Updated:  11/05/19 0731     Special Requests: --        RIGHT  HAND       GRAM STAIN GRAM POSITIVE COCCI         AEROBIC BOTTLE POSITIVE               CRITICAL RESULT NOT CALLED DUE TO PREVIOUS NOTIFICATION OF CRITICAL RESULT WITHIN THE LAST 24 HOURS. Culture result: STAPHYLOCOCCUS AUREUS               For Susceptibility Refer to Culture  [facility] [id number]      CULTURE, BLOOD [[id number]]  (Abnormal)  (Susceptibility) Collected:  11/02/19 2123    Order Status:  Completed Specimen:  Blood Updated:  11/05/19 0729     Special Requests: --        RIGHT  Antecubital       GRAM STAIN GRAM POSITIVE COCCI               AEROBIC AND ANAEROBIC BOTTLES                  RESULTS VERIFIED, PHONED TO AND READ BACK BY Imelda Asif RN AT 8083 11.3.19 CW           Culture result: STAPHYLOCOCCUS AUREUS         REFER  [address] PANEL [facility] [id number]    BLOOD CULTURE ID PANEL [[id number]]  (Abnormal) Collected:  11/02/19 2123    Order Status:  Completed Specimen:  Blood Updated:  11/03/19 1318     Acc. no. from Micro Order [id number]     Staphylococcus DETECTED        Staphylococcus aureus DETECTED        mecA (Methicillin-Resistance Genes) NOT DETECTED        INTERPRETATION       Gram positive cocci in clusters, identified in realtime PCR as probable MSSA. Comment: RESULTS VERIFIED, PHONED TO AND READ BACK BY  Imelda Asif RN AT 4448 11.3.19 CW          Clinical Consideration       Recommend discontinuing IV vancomycin starting cefazolin or nafcillin if patient not on beta-lactam therapy. Infectious Diseases Consult recommended in adult patients. THIS TEST DOES NOT REPLACE SENSITIVITY TESTING. Imaging /Procedures /Studies:    CXR Results  (Last 48 hours)    None        CT Results  (Last 48 hours)    None        No results found.   Results for orders placed or performed during the hospital encounter of 11/02/19   2D ECHO COMPLETE ADULT (TTE) W OR [address] [facility] [address] [facility], [address] [facility]  [phone]    Transthoracic Echocardiogram  2D, M-mode, Doppler, and Color Doppler    Patient: Jaclyn Stovall  MR #: [id number]  : 15-Leonardo-1960  Age: 62 years  Gender: Female  Study date: 2019  Account #: [de-identified]  Height: 61 in  Weight: 145.6 lb  BSA: 1.65 mï¾²  Status:Routine  Location: 6  BP: 108/ 62    Allergies: LATEX, NATURAL RUBBER, SILVER NITRATE APPLICATORS    Sonographer:  CARINA Willingham  Group:  [id number] Cardiology  Referring Physician:  Espinoza Clark. Brittany Colin MD  Reading Physician:  Leo Rivas MD    INDICATIONS: Staph Bacteremia  *Technically difficult study. Patient scanned sitting inclined due to severe  left hip and leg pain when laying on left side or supine. PROCEDURE: This was a routine study. A transthoracic echocardiogram was  performed. The study included complete 2D imaging, M-mode, complete spectral  Doppler, and color Doppler. Intravenous contrast (Definity) was administered. Echocardiographic views were limited by restricted patient mobility and poor  patient compliance. This was a technically difficult study. LEFT VENTRICLE: Size was normal. Systolic function was normal. Ejection  fraction was estimated in the range of 60 % to 65 %. There were no regional  wall motion abnormalities. Wall thickness was normal. Left ventricular  diastolic function parameters were normal. Avg E/e': 10.35.    RIGHT VENTRICLE: The size was normal. Systolic function was normal. The  tricuspid jet envelope definition was inadequate for estimation of RV   systolic  pressure. LEFT ATRIUM: Size was normal.    RIGHT ATRIUM: Size was normal.    SYSTEMIC VEINS: IVC: The inferior vena cava was normal in size and course. The  respirophasic change in diameter was more than 50%. AORTIC VALVE: The valve was trileaflet. Leaflets exhibited mild sclerosis. Although there was no diagnostic evidence for vegetation, this study is not  adequate to completely exclude the possibility. There was no evidence for  stenosis. There was no insufficiency.     MITRAL VALVE: Valve structure was normal. Although there was no diagnostic  evidence for vegetation, this study is not adequate to completely exclude the  possibility. There was no evidence for stenosis. There was no regurgitation. TRICUSPID VALVE: Not well visualized. Although there was no diagnostic   evidence  for vegetation, this study is not adequate to completely exclude the  possibility. There was no evidence for stenosis. There was no regurgitation. PULMONIC VALVE: Although there was no diagnostic evidence for vegetation,   this  study is not adequate to completely exclude the possibility. Not well  visualized. There was no evidence for stenosis. There was no insufficiency. PERICARDIUM: There was no pericardial effusion. AORTA: The root exhibited normal size. SUMMARY:    -  Left ventricle: Systolic function was normal. Ejection fraction was  estimated in the range of 60 % to 65 %. There were no regional wall motion  abnormalities. -  Aortic valve: Although there was no diagnostic evidence for vegetation,   this  study is not adequate to completely exclude the possibility.    -  Mitral valve: Although there was no diagnostic evidence for vegetation,   this  study is not adequate to completely exclude the possibility.    -  Tricuspid valve: Although there was no diagnostic evidence for vegetation,  this study is not adequate to completely exclude the possibility. SYSTEM MEASUREMENT TABLES    2D mode  AoR Diam (2D): 3.1 cm  LA Dimension (2D): 3.7 cm  Left Atrium Systolic Volume Index; Method of Disks, Biplane; 2D mode;: 33.3  ml/m2  IVS/LVPW (2D): 1.1  IVSd (2D): 1 cm  LVIDd (2D): 5.2 cm  LVIDs (2D): 3.2 cm  LVOT Area (2D): 2.8 cm2  LVPWd (2D): 1 cm  RVIDd (2D): 2.9 cm    Unspecified Scan Mode  Peak Grad; Mean; Antegrade Flow: 9 mm[Hg]  Vmax;  Antegrade Flow: 151 cm/s  LVOT Diam: 1.9 cm    Prepared and signed by    Alhaji Daniel MD  Signed [id number] 14:05:41         Labs and Studies from previous 24 hours have been personally reviewed by myself    ASSESSMENT      Active Hospital Problems    Diagnosis Date Noted    MSSA bacteremia 11/03/2019    Intractable low back pain 11/03/2019    Sepsis due to methicillin susceptible Staphylococcus aureus ([facility] 11/03/2019    Diabetes ([facility] 11/03/2019    Chronic pain syndrome 09/24/2019     Hospital Problems as of 11/10/2019 Never Reviewed          Codes Class Noted - Resolved POA    MSSA bacteremia ICD-10-CM: R78.81  ICD-9-CM: 790.7, 041.11  11/3/2019 - Present Yes        Intractable low back pain ICD-10-CM: M54.5  ICD-9-CM: 724.2  11/3/2019 - Present Yes        * (Principal) Sepsis due to methicillin susceptible Staphylococcus aureus ([facility] ICD-10-CM: A41.01  ICD-9-CM: 038.11, 995.91  11/3/2019 - Present Yes        Diabetes ([facility] (Chronic) ICD-10-CM: E11.9  ICD-9-CM: 250.00  11/3/2019 - Present Yes        Chronic pain syndrome (Chronic) ICD-10-CM: G89.4  ICD-9-CM: 338.4  9/24/2019 - Present Yes        RESOLVED: Nausea & vomiting ICD-10-CM: R11.2  ICD-9-CM: 787.01  11/3/2019 - 11/3/2019 Yes              A/P:    -MSSA bacteremia   BC with MSSA, repeated BC NG  ID - abxs duration 6-8 weeks- Plan for outpatient abxs on tomorrow  Cont IV cefazolin    -R hand swelling  CT R hand with no acute findings  Ortho evaluation appreciated  Likely resolved  Cont abxs    -Chronic back pain  Overall controlled  Cont pain management    -DM  Better controlled today  Cont  Lantus to 30 units and pre meal coverage  9 units    -Candida vulvovaginitis  Likely secondary to abxs  S/p Fluconazole  150 mg      DVT Prophylaxis: heparin  CODE Status: Full      Colleen Lockett MD  11/10/19

## 2019-11-10 NOTE — PROGRESS NOTES
PICC Placement Note    PRE-PROCEDURE VERIFICATION  Correct Procedure: yes. Time out completed with assistant Jayson Humphreys RN VAT and all persons present in agreement with time out. Correct Site:  yes  Temperature: Temp: 97.7 °F (36.5 °C), Temperature Source: Temp Source: Oral  Recent Labs     11/08/19  0617   BUN 9   CREA 0.40*      WBC 8.9     Allergies: Latex, natural rubber and Silver nitrate applicators  Education materials for PICC Care given to patient or family. PROCEDURE DETAIL  A single lumen PICC line was started for antibiotic therapy. The following documentation is in addition to the PICC properties in the lines/airways flowsheet :  Lot #: [id number]  xylocaine used: yes  Mid-Arm Circumference: 27 (cm)  Internal Catheter Length: 35 (cm)  Internal Catheter Total Length: 35 (cm)  Vein Selection for PICC:right basilic  Central Line Bundle followed yes  Complication Related to Insertion: none  Both the insertion guidewire and ECG guidewire were removed intact all ports have positive blood return and were flush well with normal saline. The location of the tip of the PICC is verified using ECG technology. The tip is in the SVC per ECG reading. See image below.      Line is okay to use: yes

## 2019-11-10 NOTE — PROGRESS NOTES
Hourly rounds done. Pt c/o pain, medicated per MAR. Denies nausea, vomiting. All needs met at this time.

## 2019-11-11 VITALS
SYSTOLIC BLOOD PRESSURE: 122 MMHG | DIASTOLIC BLOOD PRESSURE: 81 MMHG | HEART RATE: 101 BPM | RESPIRATION RATE: 18 BRPM | HEIGHT: 61 IN | OXYGEN SATURATION: 94 % | WEIGHT: 146 LBS | TEMPERATURE: 98 F | BODY MASS INDEX: 27.56 KG/M2

## 2019-11-11 LAB
GLUCOSE BLD STRIP.AUTO-MCNC: 162 MG/DL (ref 65–100)
GLUCOSE BLD STRIP.AUTO-MCNC: 215 MG/DL (ref 65–100)

## 2019-11-11 PROCEDURE — 94760 N-INVAS EAR/PLS OXIMETRY 1: CPT

## 2019-11-11 PROCEDURE — 74011250636 HC RX REV CODE- 250/636: Performed by: FAMILY MEDICINE

## 2019-11-11 PROCEDURE — 74011250637 HC RX REV CODE- 250/637: Performed by: INTERNAL MEDICINE

## 2019-11-11 PROCEDURE — 74011250636 HC RX REV CODE- 250/636: Performed by: INTERNAL MEDICINE

## 2019-11-11 PROCEDURE — 82962 GLUCOSE BLOOD TEST: CPT

## 2019-11-11 PROCEDURE — 74011250637 HC RX REV CODE- 250/637: Performed by: FAMILY MEDICINE

## 2019-11-11 PROCEDURE — 74011250637 HC RX REV CODE- 250/637: Performed by: NURSE PRACTITIONER

## 2019-11-11 PROCEDURE — 74011636637 HC RX REV CODE- 636/637: Performed by: INTERNAL MEDICINE

## 2019-11-11 PROCEDURE — 74011636637 HC RX REV CODE- 636/637: Performed by: FAMILY MEDICINE

## 2019-11-11 RX ORDER — SAME BUTANEDISULFONATE/BETAINE 400-600 MG
500 POWDER IN PACKET (EA) ORAL DAILY
Qty: 14 CAP | Refills: 0 | Status: SHIPPED | OUTPATIENT
Start: 2019-11-12 | End: 2019-11-19

## 2019-11-11 RX ORDER — RIFAMPIN 300 MG/1
300 CAPSULE ORAL EVERY 12 HOURS
Qty: 60 CAP | Refills: 1 | Status: SHIPPED | OUTPATIENT
Start: 2019-11-11 | End: 2019-12-20

## 2019-11-11 RX ORDER — RIFAMPIN 300 MG/1
300 CAPSULE ORAL EVERY 12 HOURS
Status: DISCONTINUED | OUTPATIENT
Start: 2019-11-11 | End: 2019-11-11 | Stop reason: HOSPADM

## 2019-11-11 RX ORDER — CEFAZOLIN SODIUM/WATER 2 G/20 ML
2 SYRINGE (ML) INTRAVENOUS EVERY 8 HOURS
Qty: 1800 ML | Refills: 1 | Status: SHIPPED
Start: 2019-11-11 | End: 2019-12-20

## 2019-11-11 RX ADMIN — INSULIN LISPRO 9 UNITS: 100 INJECTION, SOLUTION INTRAVENOUS; SUBCUTANEOUS at 08:15

## 2019-11-11 RX ADMIN — INSULIN LISPRO 2 UNITS: 100 INJECTION, SOLUTION INTRAVENOUS; SUBCUTANEOUS at 12:16

## 2019-11-11 RX ADMIN — Medication 10 ML: at 05:14

## 2019-11-11 RX ADMIN — RIFAMPIN 300 MG: 300 CAPSULE ORAL at 10:19

## 2019-11-11 RX ADMIN — INSULIN LISPRO 9 UNITS: 100 INJECTION, SOLUTION INTRAVENOUS; SUBCUTANEOUS at 12:17

## 2019-11-11 RX ADMIN — Medication 2 G: at 04:30

## 2019-11-11 RX ADMIN — FLUOXETINE 20 MG: 20 CAPSULE ORAL at 08:07

## 2019-11-11 RX ADMIN — OXYCODONE HYDROCHLORIDE 10 MG: 5 TABLET ORAL at 07:13

## 2019-11-11 RX ADMIN — Medication 2 G: at 12:21

## 2019-11-11 RX ADMIN — HYDROMORPHONE HYDROCHLORIDE 1 MG: 1 INJECTION, SOLUTION INTRAMUSCULAR; INTRAVENOUS; SUBCUTANEOUS at 08:15

## 2019-11-11 RX ADMIN — LEVOTHYROXINE SODIUM 150 MCG: 100 TABLET ORAL at 05:14

## 2019-11-11 RX ADMIN — FLUTICASONE PROPIONATE 2 SPRAY: 50 SPRAY, METERED NASAL at 08:05

## 2019-11-11 RX ADMIN — Medication 500 MG: at 08:07

## 2019-11-11 RX ADMIN — OXYCODONE HYDROCHLORIDE 10 MG: 5 TABLET ORAL at 12:27

## 2019-11-11 RX ADMIN — SENNOSIDES AND DOCUSATE SODIUM 2 TABLET: 8.6; 5 TABLET ORAL at 08:08

## 2019-11-11 RX ADMIN — GABAPENTIN 400 MG: 400 CAPSULE ORAL at 08:07

## 2019-11-11 RX ADMIN — MORPHINE SULFATE 30 MG: 30 TABLET, FILM COATED, EXTENDED RELEASE ORAL at 02:47

## 2019-11-11 RX ADMIN — HEPARIN SODIUM 5000 UNITS: 5000 INJECTION INTRAVENOUS; SUBCUTANEOUS at 05:14

## 2019-11-11 RX ADMIN — INSULIN LISPRO 4 UNITS: 100 INJECTION, SOLUTION INTRAVENOUS; SUBCUTANEOUS at 08:14

## 2019-11-11 RX ADMIN — Medication 10 ML: at 05:15

## 2019-11-11 RX ADMIN — SODIUM CHLORIDE, PRESERVATIVE FREE 300 UNITS: 5 INJECTION INTRAVENOUS at 06:00

## 2019-11-11 NOTE — PROGRESS NOTES
Hourly rounds done. Pt c/o pain, medicated per MAR. Denies nausea, vomiting. Pt observed administration of IV abx thru PICC line. All needs met at this time.

## 2019-11-11 NOTE — DISCHARGE INSTRUCTIONS
Patient Education        Peripherally Inserted Central Catheter [facility]): Care Instructions  Your Care Instructions  A peripherally inserted central catheter (PICC) is a soft, flexible tube that runs under your skin from a vein in your arm to a large vein near your heart. One end of the catheter stays outside your body. It is a type of central venous catheter, or central venous line. You may have it for weeks or months. A PICC is used to give you medicine, blood products, nutrients, or fluids. A PICC makes doing these things more comfortable for you because they are put directly into the catheter. So you will not be stuck with a needle every time. A PICC may be used to draw blood for tests only if another vein, such as in the hand or arm, can't be used. The end of the PICC sometimes has two or three openings so that you can get more than one type of fluid or medicine at a time. Your doctor may give you medicine to make you feel relaxed. You may feel a little pain when your doctor numbs your arm. Your doctor will then thread the catheter up a vein in your arm to a larger vein. You will not feel any pain. The doctor may use stitches or other devices to hold the catheter in place where it exits your arm. After the procedure, the site may be sore for a day or two. Follow-up care is a key part of your treatment and safety. Be sure to make and go to all appointments, and call your doctor if you are having problems. It's also a good idea to know your test results and keep a list of the medicines you take. How can you care for yourself at home? · Do not wear jewelry, such as necklaces, that can catch on the catheter. · If the catheter breaks, follow the instructions your doctor gave you. If you have no instructions, clamp or tie off the catheter. Then see a doctor as soon as possible. · To help prevent infection, take a shower instead of a bath. Do not go swimming with the catheter. · Try to keep the area dry.  When you shower, cover the area with waterproof material, such as plastic wrap. · Never touch the open end of the catheter if the cap is off. · Never use scissors, knives, pins, or other sharp objects near the catheter or other tubing. · If your catheter has a clamp, keep it clamped when you are not using it. · Fasten or tape the catheter to your body to prevent pulling or dangling. · Avoid clothing that rubs or pulls on your catheter. · Avoid bending or crimping your catheter. · Always wash your hands before you touch your catheter. · Wear loose clothing over the catheter for the first 10 to 14 days. When getting dressed, be careful not to pull on the catheter. How to change the dressing  Since the PICC is in one of your arms, you will not be able to change the dressing on your own. You will need someone to help you change the dressing using the same instructions that your doctor or nurse gave you. Your PICC dressing should be changed at least once a week. If the dressing becomes loose, wet, or dirty, it must be changed more often to prevent infection. Your doctor may also give you instructions for when to change the dressing. Be sure you have all your supplies ready. These include medical tape, a surgical mask, sterile gloves, and your dressing kit. The names and brands of the items will vary. Your doctor or nurse may give you specific instructions for changing the dressing. Here are basic tips for how to change the dressing. You will need help changing it. 1. Wash your hands with soap and water for 15 seconds. Dry your hands with paper towels. 2. Put on the surgical mask. 3. Loosen and remove your old dressing. Peel the dressing toward the PICC, not away from it. You may need to use an adhesive remover if your dressing does not come off easily. 4. Look at the site carefully for redness, swelling, drainage, tenderness, or warmth. If you notice any of these, call your doctor.   5. Wash your hands again, and open your dressing kit. Put on the sterile gloves. 6. Clean the site with the supplies in the dressing kit. 7. Use the dressing that your doctor gave you, and place it over the site. 8. Tape the PICC tubing to your skin so that it does not dangle or pull. When should you call for help? Call 911 anytime you think you may need emergency care. For example, call if:    · You passed out (lost consciousness).     · You have severe trouble breathing.     · You have sudden chest pain and shortness of breath, or you cough up blood.     · You have a fast or uneven pulse.    Call your doctor now or seek immediate medical care if:    · You have signs of infection, such as:  ? Increased pain, swelling, warmth, or redness. ? Red streaks leading from the area. ? Pus or blood draining from the area. ? A fever.     · You have swelling in your face, chest, neck, or arm on the side where the catheter is.     · You have signs of a blood clot, such as bulging veins near the catheter.     · Your catheter is leaking, cracked, or clogged.     · You feel resistance when you inject medicine or fluids into your catheter.     · Your catheter is out of place. This may happen after severe coughing or vomiting, or if you pull on the catheter.     · You have chest pain or shortness of breath.    Watch closely for changes in your health, and be sure to contact your doctor if:    · You have any concerns about your catheter. Where can you learn more? Go to http://caroline-joe.info/. Enter F663 in the search box to learn more about \"Peripherally Inserted Central Catheter (PICC): Care Instructions. \"  Current as of: June 26, 2019  Content Version: 12.2  © 4664-4[address] [facility]. Care instructions adapted under license by [employer] ([employer]) (which disclaims liability or warranty for this information).  If you have questions about a medical condition or this instruction, always ask your healthcare [employer]. [address] any warranty or liability for your use of this information. DISCHARGE SUMMARY from Nurse    PATIENT INSTRUCTIONS:    After general anesthesia or intravenous sedation, for 24 hours or while taking prescription Narcotics:  · Limit your activities  · Do not drive and operate hazardous machinery  · Do not make important personal or business decisions  · Do  not drink alcoholic beverages  · If you have not urinated within 8 hours after discharge, please contact your surgeon on call. Report the following to your surgeon:  · Excessive pain, swelling, redness or odor of or around the surgical area  · Temperature over 100.5  · Nausea and vomiting lasting longer than 4 hours or if unable to take medications  · Any signs of decreased circulation or nerve impairment to extremity: change in color, persistent  numbness, tingling, coldness or increase pain  · Any questions    What to do at Home:  Recommended activity: Activity as tolerated,     If you experience any of the following symptoms fever, pain, nausea or vomiting, complications with PICC line or any other worrisome symptoms, please follow up with pcp. *  Please give a list of your current medications to your Primary Care Provider. *  Please update this list whenever your medications are discontinued, doses are      changed, or new medications (including over-the-counter products) are added. *  Please carry medication information at all times in case of emergency situations. These are general instructions for a healthy lifestyle:    No smoking/ No tobacco products/ Avoid exposure to second hand smoke  Surgeon General's Warning:  Quitting smoking now greatly reduces serious risk to your health.     Obesity, smoking, and sedentary lifestyle greatly increases your risk for illness    A healthy diet, regular physical exercise & weight monitoring are important for maintaining a healthy lifestyle    You may be retaining fluid if you have a history of heart failure or if you experience any of the following symptoms:  Weight gain of 3 pounds or more overnight or 5 pounds in a week, increased swelling in our hands or feet or shortness of breath while lying flat in bed. Please call your doctor as soon as you notice any of these symptoms; do not wait until your next office visit. The discharge information has been reviewed with the patient. The patient verbalized understanding. Discharge medications reviewed with the patient and appropriate educational materials and side effects teaching were provided.   ___________________________________________________________________________________________________________________________________

## 2019-11-11 NOTE — DISCHARGE SUMMARY
Hospitalist Discharge Summary     Admit Date:  2019  7:34 PM   Name:  Bg Frost   Age:  62 y.o.  :  1960   MRN:  [id number]   PCP:  Thiago Martínez MD  Treatment Team: Attending Provider: Brant Cristobal MD; Utilization Review: Britney Block; Consulting Provider: Mohini Zuniga MD; Care Manager: Fahad Bermudez LM; Consulting Provider: Bayron Owens MD; Primary Nurse: Sherly Zuleta)    Problem List for this Hospitalization:  Hospital Problems as of 2019 Never Reviewed          Codes Class Noted - Resolved POA    MSSA bacteremia ICD-10-CM: R78.81  ICD-9-CM: 790.7, 041.11  11/3/2019 - Present Yes        Intractable low back pain ICD-10-CM: M54.5  ICD-9-CM: 724.2  11/3/2019 - Present Yes        * (Principal) Sepsis due to methicillin susceptible Staphylococcus aureus ([facility] ICD-10-CM: A41.01  ICD-9-CM: 038.11, 995.91  11/3/2019 - Present Yes        Diabetes ([facility] (Chronic) ICD-10-CM: E11.9  ICD-9-CM: 250.00  11/3/2019 - Present Yes        Chronic pain syndrome (Chronic) ICD-10-CM: G89.4  ICD-9-CM: 338.4  2019 - Present Yes        RESOLVED: Nausea & vomiting ICD-10-CM: R11.2  ICD-9-CM: 787.01  11/3/2019 - 11/3/2019 Yes                Admission HPI from 2019:    \" Please refer to HPI for more details  \". Hospital Course:    61yo F wit hx anxiety/depression, DM, factor 5, PE, and chronic low back pain who presented with acute worsening of back pain.  Has 20y hx back pain and follows pain mgmt and neurosurgery. Has stimulator implanted in back.  Patient presented to the ED c/o 5 days of severe worsening of low back pain radiating into L hip/glute area. Patient also reported having fevers of up to 103 for several days. In the ED patient UA was negative. Patient was transfer to the floor for suspected infectious process and worsening back pain. In the floor patient had CT AP with no acute findings.  Patient blood cultures were positive for MSSA. TTE with no vegetations reported. ID evaluated patient recommended 6-8 weeks of cefazolin 2g IV q8h. Duration 6-8 weeks, EOT 12/20/19. Patient will follow up with Neurosurgery outpatient for spinal stimulator removal. Patient is stable to be discharged home to follow up outpatient. Follow up instructions below. Plan was discussed with patient/careteam.  All questions answered. Patient was stable at time of discharge and was instructed to call or return if there are any concerns or recurrence of symptoms. Diagnostic Imaging/Tests:     SUMMARY:    -  Left ventricle: Systolic function was normal. Ejection fraction was  estimated in the range of 60 % to 65 %. There were no regional wall motion  abnormalities. -  Aortic valve: Although there was no diagnostic evidence for vegetation,   this  study is not adequate to completely exclude the possibility. -  Mitral valve: Although there was no diagnostic evidence for vegetation,   this  study is not adequate to completely exclude the possibility. -  Tricuspid valve: Although there was no diagnostic evidence for vegetation,  this study is not adequate to completely exclude the possibility. Clinical History: The patient is a 62years year old Female presenting with  symptoms of Joint pain, hand     Technique: Following intravenous contrast, thin section CT images through the right hand  were obtained. To optimally assess the anatomic relationships of the bones to  one another, as well as to optimally assess the individual osseous structures,  multiplanar reformatted images were also available for review.        All CT scans at this facility are performed using dose reduction/dose modulation  techniques, as appropriate the performed exam, including the following:   Automated Exposure Control;  Adjustment of the mA and/or kV according to patient  size (this includes techniques or standardized protocols for targeted exams  where dose is matched to indication/reason for exam); and Use of Iterative  Reconstruction Technique.     100 mL of Isovue-370 was administered intravenously during the exam     Radiation Exposure Indices:  Reference Air Kerma (Leocadia Fraise) = 86 mGy-cm        Comparison:  None.        FINDINGS:   Multiplanar imaging of the right hand demonstrates no evidence of significant  soft tissue swelling. Normal soft tissue planes are maintained. There is no  evidence of abnormal enhancement. There is normal bone alignment, joint spaces  are well-maintained. No definite osseous erosion or bone destruction is seen  although 3 phase bone scan would be more sensitive for possible osteomyelitis if  clinically indicated.        IMPRESSION  IMPRESSION:   No obvious acute abnormality. Abdomen and Pelvic CT, with intravenous contrast, 11/3/2019     Indication: 62year-old with a worsening low back pain radiating to the left hip  and gluteal area for 5 days. He can intermittent fevers for one month. .     Comparison: None.     Technique: Spiral images from the diaphragms to the symphysis pubis with enteric  and with 100cc Isovue 370. IV contrast was used to better evaluate the solid  organs and any possible inflammation. Coronal reconstructed images also  reviewed. Radiation dose reduction techniques were used for this study. Our CT  scanners use one or all of the following: Automated exposure control, adjustment  of the mA and/or kV according to patient size, iterative reconstruction.     Abdomen:     No abnormalities of the lung bases. Liver spleen pancreas adrenals and kidneys  unremarkable. Postcholecystectomy changes. Normal caliber aorta with calcific  atheromatous change. No retroperitoneal adenopathy. No gross upper abdominal  bowel abnormalities.        Pelvis:     Post hysterectomy changes. The appendix is not identified. There are surgical  clips about the cecum. Query appendectomy. No evidence for diverticulitis.  No  significant fluid in the pelvis.     Lumbar spine posterior decompression and pedicular screw and posterior blanche  fusion. There is a epidural stimulating device extending up to the thoracic  area.     IMPRESSION  IMPRESSION: No acute finding abdomen and pelvis. IMPRESSION:        EXAM: Chest x-ray.     INDICATION: Fever.     COMPARISON: November 25, 2013.     TECHNIQUE: Frontal and lateral view chest x-ray.     FINDINGS: The lungs are clear. The cardiac size, mediastinal contour and  pulmonary vasculature are normal. No pneumothorax or pleural effusion is seen. There are mild degenerative changes in the spine. A new spinal stimulator  catheter projects into the mid thoracic spinal canal.     IMPRESSION  IMPRESSION: No acute process. All Micro Results     Procedure Component Value Units Date/Time    CULTURE, BLOOD [[id number]] Collected:  11/07/19 0640    Order Status:  Completed Specimen:  Blood Updated:  11/11/19 0638     Special Requests: --        LEFT  HAND       Culture result: NO GROWTH 4 DAYS       CULTURE, BLOOD [[id number]] Collected:  11/07/19 0646    Order Status:  Completed Specimen:  Blood Updated:  11/11/19 0638     Special Requests: --        LEFT  FOREARM       Culture result: NO GROWTH 4 DAYS       CULTURE, BLOOD [[id number]]  (Abnormal) Collected:  11/05/19 0713    Order Status:  Completed Specimen:  Blood Updated:  11/08/19 0721     Special Requests: --        LEFT  HAND       GRAM STAIN       GRAM POS COCCI IN CLUSTERS            AEROBIC BOTTLE POSITIVE               CRITICAL RESULT NOT CALLED DUE TO PREVIOUS NOTIFICATION OF CRITICAL RESULT WITHIN THE LAST 24 HOURS.            Culture result: STAPHYLOCOCCUS AUREUS               For Susceptibility Refer to Culture  [facility] [id number]      CULTURE, BLOOD [[id number]]  (Abnormal)  (Susceptibility) Collected:  11/05/19 0705    Order Status:  Completed Specimen:  Blood Updated:  11/08/19 0719     Special Requests: --        RIGHT  HAND       GRAM STAIN GRAM POSITIVE COCCI AEROBIC BOTTLE POSITIVE               RESULTS VERIFIED, PHONED TO AND READ BACK BY Cliff Dangelo RN @ 0538 ON [id number] BY TVO           Culture result: STAPHYLOCOCCUS AUREUS         REFER TO BLOOD CULTURE IDENTIFICATION PANEL [id number]    CULTURE, URINE [[id number]]  (Abnormal)  (Susceptibility) Collected:  11/02/19 2003    Order Status:  Completed Specimen:  Cath Urine Updated:  11/06/19 0724     Special Requests: NO SPECIAL REQUESTS        Culture result:       >100,000 COLONIES/mL KLEBSIELLA PNEUMONIAE                  <1,000 CFU/ML MIXED SKIN JOEL ISOLATED          BLOOD CULTURE ID PANEL [[id number]]  (Abnormal) Collected:  11/05/19 0705    Order Status:  Completed Specimen:  Blood Updated:  11/06/19 0651     Acc. no. from Micro Order [id number]     Staphylococcus DETECTED        Staphylococcus aureus DETECTED        mecA (Methicillin-Resistance Genes) NOT DETECTED        INTERPRETATION       Gram positive cocci in clusters, identified in realtime PCR as probable MSSA. Clinical Consideration       Recommend discontinuing IV vancomycin starting cefazolin or nafcillin if patient not on beta-lactam therapy. Infectious Diseases Consult recommended in adult patients. THIS TEST DOES NOT REPLACE SENSITIVITY TESTING. CULTURE, BLOOD [[id number]]  (Abnormal) Collected:  11/02/19 2123    Order Status:  Completed Specimen:  Blood Updated:  11/05/19 0731     Special Requests: --        RIGHT  HAND       GRAM STAIN GRAM POSITIVE COCCI         AEROBIC BOTTLE POSITIVE               CRITICAL RESULT NOT CALLED DUE TO PREVIOUS NOTIFICATION OF CRITICAL RESULT WITHIN THE LAST 24 HOURS.            Culture result: STAPHYLOCOCCUS AUREUS               For Susceptibility Refer to Culture  [facility] EM.[id number]      CULTURE, BLOOD [[id number]]  (Abnormal)  (Susceptibility) Collected:  11/02/19 2123    Order Status:  Completed Specimen:  Blood Updated:  11/05/19 0729     Special Requests: --        RIGHT  Antecubital       Celesta Luis Manuel STAIN GRAM POSITIVE COCCI               AEROBIC AND ANAEROBIC BOTTLES                  RESULTS VERIFIED, PHONED TO AND READ BACK BY Marilyn Causey RN AT 8584 11.3.19 CW           Culture result: STAPHYLOCOCCUS AUREUS         REFER TO [address] PANEL [address] [id number]    BLOOD CULTURE ID PANEL [[id number]]  (Abnormal) Collected:  11/02/19 2123    Order Status:  Completed Specimen:  Blood Updated:  11/03/19 1318     Acc. no. from Micro Order [id number]     Staphylococcus DETECTED        Staphylococcus aureus DETECTED        mecA (Methicillin-Resistance Genes) NOT DETECTED        INTERPRETATION       Gram positive cocci in clusters, identified in realtime PCR as probable MSSA. Comment: RESULTS VERIFIED, PHONED TO AND READ BACK BY  Marilyn Causey RN AT 7898 11.3.19 CW          Clinical Consideration       Recommend discontinuing IV vancomycin starting cefazolin or nafcillin if patient not on beta-lactam therapy. Infectious Diseases Consult recommended in adult patients. THIS TEST DOES NOT REPLACE SENSITIVITY TESTING. Labs: Results:       BMP, Mg, Phos No results for input(s): NA, K, CL, CO2, AGAP, BUN, CREA, CA, GLU, MG, PHOS in the last 72 hours. CBC No results for input(s): WBC, RBC, HGB, HCT, PLT, GRANS, LYMPH, EOS, MONOS, BASOS, IG, ANEU, ABL, JUAN R, ABM, ABB, AIG, HGBEXT, HCTEXT, PLTEXT, HGBEXT, HCTEXT, PLTEXT in the last 72 hours. LFT No results for input(s): SGOT, ALT, TBIL, AP, TP, ALB, GLOB, AGRAT, GPT in the last 72 hours.    Cardiac Testing Lab Results   Component Value Date/Time    CK 48 11/02/2019 08:19 PM      Coagulation Tests No results found for: PTP, INR, APTT, INREXT, INREXT   A1c Lab Results   Component Value Date/Time    Hemoglobin A1c 8.5 (H) 11/06/2019 06:53 AM      Lipid Panel No results found for: CHOL, CHOLPOCT, CHOLX, CHLST, CHOLV, 797802, HDL, HDLP, LDL, LDLC, DLDLP, 947338, VLDLC, VLDL, TGLX, TRIGL, TRIGP, TGLPOCT, CHHD, CHHDX   Thyroid Panel No results found for: T4, T3U, TSH, TSHEXT, TSHEXT     Most Recent UA Lab Results   Component Value Date/Time    Color YELLOW 11/02/2019 09:03 PM    Appearance CLOUDY 11/02/2019 09:03 PM    Specific gravity 1.039 (H) 11/02/2019 09:03 PM    pH (UA) 5.5 11/02/2019 09:03 PM    Protein NEGATIVE  11/02/2019 09:03 PM    Glucose >1,000 11/02/2019 09:03 PM    Ketone 15 (A) 11/02/2019 09:03 PM    Bilirubin NEGATIVE  11/02/2019 09:03 PM    Blood NEGATIVE  11/02/2019 09:03 PM    Urobilinogen 1.0 11/02/2019 09:03 PM    Nitrites NEGATIVE  11/02/2019 09:03 PM    Leukocyte Esterase NEGATIVE  11/02/2019 09:03 PM        Allergies   Allergen Reactions    Latex, Natural Rubber Contact Dermatitis    Silver Nitrate Applicators Rash     Immunization History   Administered Date(s) Administered    TB Skin Test (PPD) Intradermal 11/03/2019       All Labs from Last 24 Hrs:  Recent Results (from the past 24 hour(s))   GLUCOSE, POC    Collection Time: 11/10/19  3:51 PM   Result Value Ref Range    Glucose (POC) 189 (H) 65 - 100 mg/dL   GLUCOSE, POC    Collection Time: 11/10/19  8:11 PM   Result Value Ref Range    Glucose (POC) 209 (H) 65 - 100 mg/dL   GLUCOSE, POC    Collection Time: 11/11/19  8:07 AM   Result Value Ref Range    Glucose (POC) 215 (H) 65 - 100 mg/dL       Discharge Exam:  Patient Vitals for the past 24 hrs:   Temp Pulse Resp BP SpO2   11/11/19 0827     94 %   11/11/19 0650 98 °F (36.7 °C) (!) 101 18 122/81 93 %   11/11/19 0420 98.1 °F (36.7 °C) 66 18 105/61 97 %   11/10/19 2347 97.6 °F (36.4 °C) 75 18 121/75 98 %   11/10/19 2002 98 °F (36.7 °C) 76 18 114/68 97 %   11/10/19 1639 98.8 °F (37.1 °C) 73 17 123/56 94 %   11/10/19 1224 97.7 °F (36.5 °C) 72 18 107/78 92 %     Oxygen Therapy  O2 Sat (%): 94 % (11/11/19 0827)  Pulse via Oximetry: 80 beats per minute (11/06/19 1950)  O2 Device: Room air (11/11/19 0827)  O2 Flow Rate (L/min): 2 l/min (11/06/19 1950)  No intake or output data in the 24 hours ending 11/11/19 1057      Patient reported back pain is overall controlled. Patient reported feeling in good spirits. General:               Alert, cooperative, no distress   Lungs: No wheezing/rhonchi/rales  Cardiovascular:   RRR. No m/r/g. No pedal edema b/l. Abdomen:            S/nt/nd. Bowel sounds normal.    Neurologic:        No gross focal deficit. Extremities:         R hand with no tenderness, no erythema         Discharge Info:   Current Discharge Medication List      START taking these medications    Details   Saccharomyces boulardii (FLORASTOR) 250 mg capsule Take 2 Caps by mouth daily for 7 days. Qty: 14 Cap, Refills: 0      ceFAZolin (ANCEF) in sterile water 2 gram/20 mL 2 g IV syringe 20 mL by IntraVENous route every eight (8) hours for 39 days. Qty: 1800 mL, Refills: 1      rifAMPin (RIFADIN) 300 mg capsule Take 1 Cap by mouth every twelve (12) hours for 39 days. Qty: 60 Cap, Refills: 1         CONTINUE these medications which have NOT CHANGED    Details   ertugliflozin-sitagliptin (STEGLUJAN)  mg tab Take 1 Tab by mouth daily. celecoxib (CELEBREX) 200 mg capsule Take 200 mg by mouth daily. morphine CR (MS CONTIN) 15 mg CR tablet Take 15 mg by mouth every twelve (12) hours. albuterol (VENTOLIN HFA) 90 mcg/actuation inhaler Take 1 Puff by inhalation every four (4) hours as needed. glucose blood VI test strips (ONETOUCH ULTRA BLUE TEST STRIP) strip OneTouch Ultra Blue In Vitro Strip   Quantity: 100;  Refills: 0     Started 19-Aug-2012  Active      clonazePAM (KLONOPIN) 0.5 mg tablet Take 0.5 mg by mouth two (2) times a day. estradiol (ESTRACE) 0.01 % (0.1 mg/gram) vaginal cream Every Mon, Wed & Sun.      FLUoxetine (PROZAC) 10 mg tablet 20 mg daily. fluticasone propionate (FLONASE) 50 mcg/actuation nasal spray 2 Sprays by Nasal route.      gabapentin (NEURONTIN) 800 mg tablet 800 mg.  Take 1 tab at supper then 2 tabs at bedtime      HYDROcodone-acetaminophen (NORCO)  mg tablet Take 1 Tab by mouth every eight (8) hours as needed. levothyroxine (SYNTHROID) 150 mcg tablet Take 150 mcg by mouth Daily (before breakfast). lisinopril (PRINIVIL, ZESTRIL) 10 mg tablet 5 mg.      metFORMIN (GLUCOPHAGE) 1,000 mg tablet 500 mg two (2) times daily (with meals). MOVANTIK 25 mg tab tablet Take 25 mg by mouth Daily (before breakfast). pioglitazone (ACTOS) 15 mg tablet Take 15 mg by mouth daily. raNITIdine (ZANTAC) 300 mg tab Take 300 mg by mouth nightly. rivaroxaban (XARELTO) 20 mg tab tablet Take 20 mg by mouth daily (with dinner). rosuvastatin (CRESTOR) 20 mg tablet Take 20 mg by mouth nightly. traZODone (DESYREL) 100 mg tablet Take 100 mg by mouth nightly. STOP taking these medications       dexAMETHasone (DECADRON) 2 mg tablet Comments:   Reason for Stopping:                 Disposition: home  Activity: Activity as tolerated  Diet: Diabetic Diet    Follow-up Information     Follow up With Specialties Details Why Contact Info    Yamilet Mancuso MD [address] [phone]                Signed:   Sakina Lopez MD

## 2019-11-11 NOTE — PROGRESS NOTES
[facility] we have final infusion home orders and pt is stable for discharge. Pt will need pic line placed prior to discharge.

## 2019-11-11 NOTE — PROGRESS NOTES
Infectious Disease Note    Today's Date: 2019   Admit Date: 2019    Impression:   · MSSA bacteremia (, ). TTE NG but technically difficult  · Exacerbation of chronic back pain in the setting of lumbar hardware and spinal cord stimulator. Unable to perform MRI due to [facility]  · [facility] swelling and pain-concerns for tenosynovitis; improving and CT unremarkable    Plan:   · Continue cefazolin 2g IV q8h. Duration 6-8 weeks, EOT 19. · Add Rifampin 300mg oral BID   · Follow repeat BCs collected 19  · Duration: 6-8 week and then make a decision about extending past that. 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 Ideally, would like removal during abx course. We do not know for sure if SCC is infected but removal would help by allowing MRIs to locate infectious sites. · OPAT orders written. ID appt:  at 1000AM  · Dispo: home   35 min spent with patient/ 50% c/c/c as in discussing outpt abx plans with case management and patient, coordinating ID plans. Segundo Campos, pt's daughter, is a [employer] RN. Cell number [phone]  Anti-infectives:   · Ceftriaxone (19 - 11/3/19)  · Vancomycin (11/3/19)  · Cefazolin (11/3 - )    Subjective:   Same back pain but controlled better with meds this week.  in room. Both anxious to go home today. PICC line in place. Updated on ID plans     Allergies   Allergen Reactions    Latex, Natural Rubber Contact Dermatitis    Silver Nitrate Applicators Rash        Review of Systems:  A comprehensive review of systems was negative except for that written in the History of Present Illness.     Objective:     Visit Vitals  /81 (BP 1 Location: Left arm, BP Patient Position: At rest;Post activity)   Pulse (!) 101   Temp 98 °F (36.7 °C)   Resp 18   Ht 5' 1\" (1.549 m)   Wt 66.2 kg (146 lb)   SpO2 93%   BMI 27.59 kg/m²     Temp (24hrs), Av °F (36.7 °C), Min:97.6 °F (36.4 °C), Max:98.8 °F (37.1 °C)       Lines:RUE PICC      Physical Exam:    General: Alert, cooperative, appears stated age; in discomfort, but walking around in the room   Eyes:  Sclera anicteric. Pupils equally round and reactive to light. Mouth/Throat: Mucous membranes normal, oral pharynx clear   Neck: Supple   Lungs:   Clear to auscultation bilaterally, good effort   CV:  Regular rate and rhythm, no murmur, click, rub or gallop   Abdomen:   Soft, non-tender. bowel sounds normal. non-distended   Extremities: No cyanosis or edema   Skin: Skin color, texture, turgor normal. no acute rash or lesions   Lymph nodes: Cervical and supraclavicular normal   Musculoskeletal: No swelling or deformity. Right hand swelling   Lines/Devices:  Intact, no erythema, drainage or tenderness   Psych:  Back:  Alert and oriented, normal mood affect given the setting  Spinal stimulator generator site and lumbar surgical site look benign             Data Review:     CBC:  No results for input(s): WBC, GRANS, MONOS, EOS, ANEU, ABL, HGB, HCT, PLT, HGBEXT, HCTEXT, PLTEXT, HGBEXT, HCTEXT, PLTEXT in the last 72 hours. No lab exists for component: LYMPHS,  JUAN R    BMP:  No results for input(s): CREA, BUN, NA, K, CL, CO2, AGAP, GLU in the last 72 hours. LFTS:  No results for input(s): TBILI, ALT, SGOT, AP, TP, ALB in the last 72 hours.     Microbiology:     All Micro Results     Procedure Component Value Units Date/Time    CULTURE, BLOOD [[id number]] Collected:  11/07/19 0640    Order Status:  Completed Specimen:  Blood Updated:  11/11/19 0638     Special Requests: --        LEFT  HAND       Culture result: NO GROWTH 4 DAYS       CULTURE, BLOOD [[id number]] Collected:  11/07/19 0646    Order Status:  Completed Specimen:  Blood Updated:  11/11/19 0638     Special Requests: --        LEFT  FOREARM       Culture result: NO GROWTH 4 DAYS       CULTURE, BLOOD [[id number]]  (Abnormal) Collected:  11/05/19 0713    Order Status:  Completed Specimen:  Blood Updated:  11/08/19 0721     Special Requests: --        LEFT  HAND GRAM STAIN       GRAM POS COCCI IN CLUSTERS            AEROBIC BOTTLE POSITIVE               CRITICAL RESULT NOT CALLED DUE TO PREVIOUS NOTIFICATION OF CRITICAL RESULT WITHIN THE LAST 24 HOURS. Culture result: STAPHYLOCOCCUS AUREUS               For Susceptibility Refer to Culture  [address] [id number]      CULTURE, BLOOD [[id number]]  (Abnormal)  (Susceptibility) Collected:  11/05/19 0705    Order Status:  Completed Specimen:  Blood Updated:  11/08/19 0719     Special Requests: --        RIGHT  HAND       GRAM STAIN GRAM POSITIVE COCCI         AEROBIC BOTTLE POSITIVE               RESULTS VERIFIED, PHONED TO AND READ BACK BY Jose Alberto Rose RN @ 08 ON [id number] BY TVO           Culture result: STAPHYLOCOCCUS AUREUS         REFER TO BLOOD CULTURE IDENTIFICATION PANEL [id number]    CULTURE, URINE [[id number]]  (Abnormal)  (Susceptibility) Collected:  11/02/19 2003    Order Status:  Completed Specimen:  Cath Urine Updated:  11/06/19 0724     Special Requests: NO SPECIAL REQUESTS        Culture result:       >100,000 COLONIES/mL KLEBSIELLA PNEUMONIAE                  <1,000 CFU/ML MIXED SKIN JOEL ISOLATED          BLOOD CULTURE ID PANEL [[id number]]  (Abnormal) Collected:  11/05/19 0705    Order Status:  Completed Specimen:  Blood Updated:  11/06/19 0651     Acc. no. from Micro Order [id number]     Staphylococcus DETECTED        Staphylococcus aureus DETECTED        mecA (Methicillin-Resistance Genes) NOT DETECTED        INTERPRETATION       Gram positive cocci in clusters, identified in realtime PCR as probable MSSA. Clinical Consideration       Recommend discontinuing IV vancomycin starting cefazolin or nafcillin if patient not on beta-lactam therapy. Infectious Diseases Consult recommended in adult patients. THIS TEST DOES NOT REPLACE SENSITIVITY TESTING.           CULTURE, BLOOD [[id number]]  (Abnormal) Collected:  11/02/19 2123    Order Status:  Completed Specimen:  Blood Updated:  11/05/19 0731     Special Requests: --        RIGHT  HAND       GRAM STAIN GRAM POSITIVE COCCI         AEROBIC BOTTLE POSITIVE               CRITICAL RESULT NOT CALLED DUE TO PREVIOUS NOTIFICATION OF CRITICAL RESULT WITHIN THE LAST 24 HOURS. Culture result: STAPHYLOCOCCUS AUREUS               For Susceptibility Refer to Culture  [facility] [id number]      CULTURE, BLOOD [[id number]]  (Abnormal)  (Susceptibility) Collected:  11/02/19 2123    Order Status:  Completed Specimen:  Blood Updated:  11/05/19 0729     Special Requests: --        RIGHT  Antecubital       GRAM STAIN GRAM POSITIVE COCCI               AEROBIC AND ANAEROBIC BOTTLES                  RESULTS VERIFIED, PHONED TO AND READ BACK BY Chani Blackwell RN AT 0321 11.3.19            Culture result: STAPHYLOCOCCUS AUREUS         REFER  [address] PANEL [facility] [id number]    BLOOD CULTURE ID PANEL [[id number]]  (Abnormal) Collected:  11/02/19 2123    Order Status:  Completed Specimen:  Blood Updated:  11/03/19 1318     Acc. no. from Micro Order [id number]     Staphylococcus DETECTED        Staphylococcus aureus DETECTED        mecA (Methicillin-Resistance Genes) NOT DETECTED        INTERPRETATION       Gram positive cocci in clusters, identified in realtime PCR as probable MSSA. Comment: RESULTS VERIFIED, PHONED TO AND READ BACK BY  Chani Blackwell RN AT 6881 11.3.19           Clinical Consideration       Recommend discontinuing IV vancomycin starting cefazolin or nafcillin if patient not on beta-lactam therapy. Infectious Diseases Consult recommended in adult patients. THIS TEST DOES NOT REPLACE SENSITIVITY TESTING. Imaging:   CT abd/pelvis (11/3/19)  IMPRESSION: No acute finding abdomen and pelvis    CXR (11/2/19)  IMPRESSION: No acute process    CT spine (11/1/19)  IMPRESSION:  1. Postoperative changes noted L3-L5. 2. Moderate bilateral neural foraminal narrowing present at L4-L5 and L5-S1  3. Mild bilateral neural foraminal narrowing noted at L3-L4.     CT myelogram (11/1/19)   Contrast readily flows throughout the lumbar region      Signed By: Christian Collado NP     November 11, 2019

## 2019-11-11 NOTE — PROGRESS NOTES
Discharge instructions and prescriptions provided and explained to patient, patient voiced understanding. Medication side effect sheet reviewed with pt. No home meds or valuables to return. Opportunity for questions provided. Pt to get IV Ancef prior to discharge.

## 2019-11-11 NOTE — PROGRESS NOTES
Morphine Sulfate ER, Klonopin, and Norco counted with Lawerance Ping and returned to pt prior to discharge.

## 2019-11-12 ENCOUNTER — TELEPHONE (OUTPATIENT)
Dept: HOME HEALTH SERVICES | Facility: HOME HEALTH | Age: 59
End: 2019-11-12

## 2019-11-12 ENCOUNTER — PATIENT OUTREACH (OUTPATIENT)
Dept: CASE MANAGEMENT | Age: 59
End: 2019-11-12

## 2019-11-12 LAB
BACTERIA SPEC CULT: NORMAL
BACTERIA SPEC CULT: NORMAL
SERVICE CMNT-IMP: NORMAL
SERVICE CMNT-IMP: NORMAL

## 2019-11-12 NOTE — PROGRESS NOTES
This note will not be viewable in [address] Transition of Care Discharge Follow-up Questionnaire   Date/Time of Call:   11/12/2019 12:20 pm    What was the patient hospitalized for? Sepsis due to methicillin susceptible staphylococcus aureus    Does the patient understand his/her diagnosis and/or treatment and what happened during the hospitalization? Yes, spoke with patient and she states she has an understanding of his recent hospitalization, diagnosis and treatment. She is agreeable to the call. Did the patient receive discharge instructions? Yes    CM Assessed Risk for Readmission:       Patient stated Risk for Readmission:      Low risk for readmission. Patient did not voice any concerns regarding readmission. Review any discharge instructions [facility] Ask patient if they understand these. Do they have any questions? Discharge instructions reviewed with patient and she verbalized understanding of discharge instructions. Were home services ordered (nursing, PT, OT, ST, etc.)? Yes    If so, has the first visit occurred? If not, why? (Assist with coordination of services if necessary.)   Scheduled for today 11/1/2019   Was any DME ordered? No   If so, has it been received? If not, why?  (Assist patient in obtaining DME orders &/or equipment if necessary.) N/A   Complete a review of all medications (new, continued and discontinued meds per the D/C instructions and medication tab in [facility]). START taking:  ceFAZolin (ANCEF) in sterile water 2 gram/20 mL IV syringe  rifAMPin 300 mg capsule (RIFADIN)  Saccharomyces boulardii 250 mg capsule (FLORASTOR)  Start taking on: 11/12/2019  STOP taking:  dexAMETHasone 2 mg tablet (DECADRON)  RESUME ALL PREVIOUS MEDICATIONS:    Were all new prescriptions filled?   If not, why?  (Assist patient in obtaining medications if necessary  escalate for CCM &/or SW if ongoing issues are verbalized by pt or anticipated)   Yes       Does the patient understand the purpose and dosing instructions for all medications? (If patient has questions, provide explanation and education.)   Medications reviewed with patient and she verbalized understanding, purpose and dosing of medications. Does the patient have any problems in performing ADLs? (If patient is unable to perform ADLs  what is the limiting factor(s)? Do they have a support system that can assist? If no support system is present, discuss possible assistance that they may be able to obtain. Escalate for CCM/SW if ongoing issues are verbalized by pt or anticipated)   Patient independent with ADLs. Does the patient have all follow-up appointments scheduled? 7 day f/up with PCP?   (f/up with PCP may be w/in 14 days if patient has a f/up with their specialist w/in 7 days)    7-14 day f/up with specialist?   (or per discharge instructions)    If f/up has not been made  what actions has the care coordinator made to accomplish this? Has transportation been arranged? FU scheduled with Dr. Vera Moran 11/14/2019 at 1:45 pm     FU with [facility] 12/20/2019     Patient to call PCP Dr. Yane Seay and schedule FU appointment. Discussed the importance of FU appointments with patient and she verbalized understanding. No transportation needs at this time. Any other questions or concerns expressed by the patient? No other voiced questions or concerns. Schedule next appointment with ELIOT HERNANDES Coordinator or refer to RN Case Manager/ per the workflow guidelines. When is care coordinators next follow-up call scheduled? If referred for CCM  what RN care manager was the referral assigned? Contact information for Care Coordinator given and instructions to call with any questions or concerns.        7-14 days        BEBO Call Completed By: Talia Rosas LPN Care Coordinator

## 2019-11-12 NOTE — TELEPHONE ENCOUNTER
[address] [place] [address] Jessica Tang Pharmacist consult. Mrs. Ulises Nazario was discharged yesterday after having sepsis. She was discharged home on IV cefazolin 2 gm. q 8 hr x 39 days and also rifampin 300mg.q 12 hrs x 39 days. I reviewed these medications with her. The rifampin is turning her urine orange and I told her that was to be expected. She does not have a pump for her antibiotics, but has to administer every 8 hours. Her main issue is her pain. She said it is in her bones and the Norco and MS contin 15 are just not strong enough. I encouraged her to see her PCP about this. She will see Dr. Jose Kellogg on 11/14, but I am not sure he will address the pain. I asked her to call with any medication issues. She is being referred to [facility] due to [facility] unable to see her today.

## 2019-11-14 PROBLEM — R78.81 BACTEREMIA: Status: ACTIVE | Noted: 2019-11-14

## 2019-11-26 ENCOUNTER — PATIENT OUTREACH (OUTPATIENT)
Dept: CASE MANAGEMENT | Age: 59
End: 2019-11-26

## 2019-11-26 NOTE — PROGRESS NOTES
This note will not be viewable in [address] Transitions of Care  Follow up Outreach Note   Outreach type Phone call: Spoke with patient  Home visit:   Date/Time of Outreach: 11/26/2019   12:40 pm      Has patient attended PCP or specialist follow-up appointments since last contact? What was outcome of appointment? When is next follow-up scheduled? FU completed with Dr. Vinicius Ha 11/14/2019 with future appointments scheduled appropriately. Referral to pain management and Rx for 7 days of Oxycodone given for pain. After IV antibiotics have been completed spinal cord stimulator will be removed. Review medications. Any medication changes since last outreach? Does patient have any questions or issues related to their medications? Medications reviewed with patient and Rx for 7 days of Oxycodone given for pain. Patient states she had an appointment with pain management today but was not able to go. Appointment rescheduled for 12/2/2019. Home health active? If yes  any issue? Progress? ,Yes,[facility]        Referrals needed?  (CM, SW, HH, etc. )   No   Other issues/Miscellaneous? (Transportation, access to meals, ability to perform ADLs, adequate caregiver support, etc.) No transportation needs. Next Outreach Scheduled? Graduation from program?   15-30 days      Next Steps/Goals (if applicable):          Outreach completed by:   Katharine Kowalski LPN Care Coordinator

## 2019-12-11 ENCOUNTER — PATIENT OUTREACH (OUTPATIENT)
Dept: CASE MANAGEMENT | Age: 59
End: 2019-12-11

## 2020-07-10 NOTE — PROGRESS NOTES
Problem: Pain  Goal: *Control of Pain  Outcome: Progressing Towards Goal  Goal: *PALLIATIVE CARE:  Alleviation of Pain  Outcome: Progressing Towards Goal     Problem: Diabetes Self-Management  Goal: *Disease process and treatment process  Description  Define diabetes and identify own type of diabetes; list 3 options for treating diabetes. Outcome: Progressing Towards Goal     Problem: Diabetes Self-Management  Goal: *Disease process and treatment process  Description  Define diabetes and identify own type of diabetes; list 3 options for treating diabetes. Outcome: Progressing Towards Goal  Goal: *Incorporating nutritional management into lifestyle  Description  Describe effect of type, amount and timing of food on blood glucose; list 3 methods for planning meals. Outcome: Progressing Towards Goal  Goal: *Incorporating physical activity into lifestyle  Description  State effect of exercise on blood glucose levels. Outcome: Progressing Towards Goal  Goal: *Developing strategies to promote health/change behavior  Description  Define the ABC's of diabetes; identify appropriate screenings, schedule and personal plan for screenings. Outcome: Progressing Towards Goal  Goal: *Using medications safely  Description  State effect of diabetes medications on diabetes; name diabetes medication taking, action and side effects. Outcome: Progressing Towards Goal  Goal: *Monitoring blood glucose, interpreting and using results  Description  Identify recommended blood glucose targets  and personal targets. Outcome: Progressing Towards Goal  Goal: *Prevention, detection, treatment of acute complications  Description  List symptoms of hyper- and hypoglycemia; describe how to treat low blood sugar and actions for lowering  high blood glucose level.   Outcome: Progressing Towards Goal  Goal: *Prevention, detection and treatment of chronic complications  Description  Define the natural course of diabetes and describe the Pt admitted to [facility] 3 about 0100 from ED. Pt alert and oriented x 3; some confusion at times for example when asked a question, pt will ask question out loud. Vital signs stable; MEWS green. Pt has some pain in her wrist which is not new on this admit. Pt activity up with 1 assist, gait belt and walker. Pt stable otherwise. Pt has all personal belongings, call light within reach, and bed in low height. Plan for today PT eval and antibiotics as well as labs pending. RN will continue to monitor and report any changes as needed.    relationship of blood glucose levels to long term complications of diabetes.   Outcome: Progressing Towards Goal  Goal: *Developing strategies to address psychosocial issues  Description  Describe feelings about living with diabetes; identify support needed and support network  Outcome: Progressing Towards Goal  Goal: *Insulin pump training  Outcome: Progressing Towards Goal  Goal: *Sick day guidelines  Outcome: Progressing Towards Goal  Goal: *Patient Specific Goal (EDIT GOAL, INSERT TEXT)  Outcome: Progressing Towards Goal